# Patient Record
Sex: MALE | Race: BLACK OR AFRICAN AMERICAN | Employment: OTHER | URBAN - METROPOLITAN AREA
[De-identification: names, ages, dates, MRNs, and addresses within clinical notes are randomized per-mention and may not be internally consistent; named-entity substitution may affect disease eponyms.]

---

## 2017-03-08 ENCOUNTER — OFFICE VISIT (OUTPATIENT)
Dept: CARDIOLOGY CLINIC | Age: 82
End: 2017-03-08

## 2017-03-08 ENCOUNTER — PROCEDURE VISIT (OUTPATIENT)
Dept: CARDIOLOGY CLINIC | Age: 82
End: 2017-03-08

## 2017-03-08 VITALS
HEART RATE: 94 BPM | DIASTOLIC BLOOD PRESSURE: 80 MMHG | BODY MASS INDEX: 25.99 KG/M2 | SYSTOLIC BLOOD PRESSURE: 130 MMHG | WEIGHT: 189 LBS

## 2017-03-08 DIAGNOSIS — I48.91 ATRIAL FIBRILLATION, UNSPECIFIED TYPE (HCC): Primary | ICD-10-CM

## 2017-03-08 DIAGNOSIS — I22.2 ACUTE NON-ST-ELEVATION MI FOLLOWING PREVIOUS MI (HCC): ICD-10-CM

## 2017-03-08 DIAGNOSIS — Z95.0 CARDIAC PACEMAKER IN SITU: ICD-10-CM

## 2017-03-08 DIAGNOSIS — R00.1 BRADYCARDIA: ICD-10-CM

## 2017-03-08 PROCEDURE — G8484 FLU IMMUNIZE NO ADMIN: HCPCS | Performed by: INTERNAL MEDICINE

## 2017-03-08 PROCEDURE — 1036F TOBACCO NON-USER: CPT | Performed by: INTERNAL MEDICINE

## 2017-03-08 PROCEDURE — G8419 CALC BMI OUT NRM PARAM NOF/U: HCPCS | Performed by: INTERNAL MEDICINE

## 2017-03-08 PROCEDURE — 93000 ELECTROCARDIOGRAM COMPLETE: CPT | Performed by: INTERNAL MEDICINE

## 2017-03-08 PROCEDURE — 1123F ACP DISCUSS/DSCN MKR DOCD: CPT | Performed by: INTERNAL MEDICINE

## 2017-03-08 PROCEDURE — 93280 PM DEVICE PROGR EVAL DUAL: CPT | Performed by: INTERNAL MEDICINE

## 2017-03-08 PROCEDURE — 4040F PNEUMOC VAC/ADMIN/RCVD: CPT | Performed by: INTERNAL MEDICINE

## 2017-03-08 PROCEDURE — G8427 DOCREV CUR MEDS BY ELIG CLIN: HCPCS | Performed by: INTERNAL MEDICINE

## 2017-03-08 PROCEDURE — 99215 OFFICE O/P EST HI 40 MIN: CPT | Performed by: INTERNAL MEDICINE

## 2017-03-08 RX ORDER — FINASTERIDE 5 MG/1
5 TABLET, FILM COATED ORAL DAILY
COMMUNITY
Start: 2017-03-05

## 2017-03-08 RX ORDER — METOPROLOL SUCCINATE 50 MG/1
TABLET, EXTENDED RELEASE ORAL
Refills: 2 | COMMUNITY
Start: 2017-01-05 | End: 2017-10-05 | Stop reason: SDUPTHER

## 2017-03-08 RX ORDER — SIMVASTATIN 40 MG
TABLET ORAL
Refills: 2 | COMMUNITY
Start: 2017-01-05

## 2017-03-08 RX ORDER — FUROSEMIDE 40 MG/1
TABLET ORAL
Refills: 3 | Status: ON HOLD | COMMUNITY
Start: 2017-01-17 | End: 2020-07-05 | Stop reason: SDUPTHER

## 2017-03-08 RX ORDER — BIMATOPROST 0.01 %
DROPS OPHTHALMIC (EYE)
COMMUNITY
Start: 2017-03-05 | End: 2020-07-01

## 2017-04-03 ENCOUNTER — HOSPITAL ENCOUNTER (OUTPATIENT)
Dept: NON INVASIVE DIAGNOSTICS | Age: 82
Discharge: HOME | End: 2017-04-03
Attending: INTERNAL MEDICINE | Admitting: INTERNAL MEDICINE

## 2017-04-03 DIAGNOSIS — I48.91 ATRIAL FIBRILLATION (HCC): ICD-10-CM

## 2017-04-03 LAB
LV EF: 50 %
LVEF MODALITY: NORMAL

## 2017-04-10 ENCOUNTER — TELEPHONE (OUTPATIENT)
Dept: CARDIOLOGY CLINIC | Age: 82
End: 2017-04-10

## 2017-06-01 ENCOUNTER — OFFICE VISIT (OUTPATIENT)
Dept: CARDIOLOGY CLINIC | Age: 82
End: 2017-06-01

## 2017-06-01 ENCOUNTER — PROCEDURE VISIT (OUTPATIENT)
Dept: CARDIOLOGY CLINIC | Age: 82
End: 2017-06-01

## 2017-06-01 VITALS
WEIGHT: 181 LBS | DIASTOLIC BLOOD PRESSURE: 70 MMHG | SYSTOLIC BLOOD PRESSURE: 110 MMHG | BODY MASS INDEX: 24.89 KG/M2 | HEART RATE: 76 BPM

## 2017-06-01 DIAGNOSIS — R00.1 BRADYCARDIA: ICD-10-CM

## 2017-06-01 DIAGNOSIS — Z95.0 CARDIAC PACEMAKER IN SITU: ICD-10-CM

## 2017-06-01 DIAGNOSIS — I48.91 ATRIAL FIBRILLATION, UNSPECIFIED TYPE (HCC): Primary | ICD-10-CM

## 2017-06-01 PROCEDURE — 99214 OFFICE O/P EST MOD 30 MIN: CPT | Performed by: INTERNAL MEDICINE

## 2017-06-01 PROCEDURE — G8420 CALC BMI NORM PARAMETERS: HCPCS | Performed by: INTERNAL MEDICINE

## 2017-06-01 PROCEDURE — 4040F PNEUMOC VAC/ADMIN/RCVD: CPT | Performed by: INTERNAL MEDICINE

## 2017-06-01 PROCEDURE — 93280 PM DEVICE PROGR EVAL DUAL: CPT | Performed by: INTERNAL MEDICINE

## 2017-06-01 PROCEDURE — 1036F TOBACCO NON-USER: CPT | Performed by: INTERNAL MEDICINE

## 2017-06-01 PROCEDURE — 1123F ACP DISCUSS/DSCN MKR DOCD: CPT | Performed by: INTERNAL MEDICINE

## 2017-06-01 PROCEDURE — G8427 DOCREV CUR MEDS BY ELIG CLIN: HCPCS | Performed by: INTERNAL MEDICINE

## 2017-06-01 RX ORDER — TIMOLOL MALEATE 5 MG/ML
1 SOLUTION OPHTHALMIC 2 TIMES DAILY
COMMUNITY

## 2017-06-01 RX ORDER — ASPIRIN 81 MG/1
81 TABLET, CHEWABLE ORAL DAILY
COMMUNITY
End: 2019-12-12

## 2017-08-24 ENCOUNTER — TELEPHONE (OUTPATIENT)
Dept: CARDIOLOGY CLINIC | Age: 82
End: 2017-08-24

## 2017-09-07 ENCOUNTER — PROCEDURE VISIT (OUTPATIENT)
Dept: CARDIOLOGY CLINIC | Age: 82
End: 2017-09-07

## 2017-09-07 DIAGNOSIS — R00.1 BRADYCARDIA: ICD-10-CM

## 2017-09-07 DIAGNOSIS — Z95.0 CARDIAC PACEMAKER IN SITU: ICD-10-CM

## 2017-09-07 PROCEDURE — 93280 PM DEVICE PROGR EVAL DUAL: CPT | Performed by: INTERNAL MEDICINE

## 2017-10-05 DIAGNOSIS — I10 ESSENTIAL HYPERTENSION: Primary | ICD-10-CM

## 2017-10-05 DIAGNOSIS — I48.91 ATRIAL FIBRILLATION, UNSPECIFIED TYPE (HCC): ICD-10-CM

## 2017-10-05 RX ORDER — METOPROLOL SUCCINATE 50 MG/1
TABLET, EXTENDED RELEASE ORAL
Qty: 30 TABLET | Refills: 0 | Status: ON HOLD | OUTPATIENT
Start: 2017-10-05 | End: 2020-07-05 | Stop reason: SDUPTHER

## 2017-10-05 RX ORDER — LISINOPRIL 10 MG/1
10 TABLET ORAL DAILY
Qty: 30 TABLET | Refills: 0 | Status: ON HOLD | OUTPATIENT
Start: 2017-10-05 | End: 2020-07-05 | Stop reason: HOSPADM

## 2017-10-05 NOTE — TELEPHONE ENCOUNTER
Daughter calling back to say the meds  That he needs filled right away.  Has NOT had any for 4 days    Lisinopril    Metoprolol

## 2017-10-05 NOTE — TELEPHONE ENCOUNTER
Called and spoke with Wei informed her that PCP Dr. Deborah Bills signed this ordered and RX was sent to him to sign, she verbalized understanding and stated pt has an apt to see PCP tomorrow

## 2017-10-05 NOTE — TELEPHONE ENCOUNTER
Please call daughter Nasir Walker about her fathers meds.     She thinks he may be out of 2 BP med but not really sure what they are    Please call her to discuss    [facility] [address] on [address]    Please call her to advise

## 2017-10-05 NOTE — TELEPHONE ENCOUNTER
This patient recent saw Chiki Matthews and has pacemaker. Will refill lisinopril and toprol xl for 1 month. He has appt to see PCP tomorrow. Spoke to his daughter. Verified pharmacy-[facility]. Phone in 2 Rx for him. No refill will have PCP re-eval his BP tomorrow and adj meds if necessary. Daughter will make sure pt take his meds on time tonight and let PCP know tomorrow.

## 2017-12-12 ENCOUNTER — NURSE ONLY (OUTPATIENT)
Dept: CARDIOLOGY CLINIC | Age: 82
End: 2017-12-12

## 2017-12-12 DIAGNOSIS — R00.1 BRADYCARDIA: ICD-10-CM

## 2017-12-12 DIAGNOSIS — Z95.0 CARDIAC PACEMAKER IN SITU: ICD-10-CM

## 2017-12-12 PROCEDURE — 93296 REM INTERROG EVL PM/IDS: CPT | Performed by: INTERNAL MEDICINE

## 2017-12-12 PROCEDURE — 93294 REM INTERROG EVL PM/LDLS PM: CPT | Performed by: INTERNAL MEDICINE

## 2018-02-15 ENCOUNTER — OFFICE VISIT (OUTPATIENT)
Dept: CARDIOLOGY CLINIC | Age: 83
End: 2018-02-15

## 2018-02-15 ENCOUNTER — PROCEDURE VISIT (OUTPATIENT)
Dept: CARDIOLOGY CLINIC | Age: 83
End: 2018-02-15

## 2018-02-15 VITALS — BODY MASS INDEX: 25.44 KG/M2 | HEART RATE: 72 BPM | WEIGHT: 185 LBS

## 2018-02-15 DIAGNOSIS — I49.1 PAC (PREMATURE ATRIAL CONTRACTION): ICD-10-CM

## 2018-02-15 DIAGNOSIS — R00.1 BRADYCARDIA: ICD-10-CM

## 2018-02-15 DIAGNOSIS — N18.9 CHRONIC KIDNEY DISEASE, UNSPECIFIED CKD STAGE: ICD-10-CM

## 2018-02-15 DIAGNOSIS — Z95.0 CARDIAC PACEMAKER IN SITU: ICD-10-CM

## 2018-02-15 DIAGNOSIS — I44.30 AV BLOCK: Primary | ICD-10-CM

## 2018-02-15 PROCEDURE — 99214 OFFICE O/P EST MOD 30 MIN: CPT | Performed by: INTERNAL MEDICINE

## 2018-02-15 PROCEDURE — 93280 PM DEVICE PROGR EVAL DUAL: CPT | Performed by: INTERNAL MEDICINE

## 2018-05-22 ENCOUNTER — NURSE ONLY (OUTPATIENT)
Dept: CARDIOLOGY CLINIC | Age: 83
End: 2018-05-22

## 2018-05-22 DIAGNOSIS — Z95.0 CARDIAC PACEMAKER IN SITU: ICD-10-CM

## 2018-05-22 DIAGNOSIS — R00.1 BRADYCARDIA: ICD-10-CM

## 2018-05-22 PROCEDURE — 93294 REM INTERROG EVL PM/LDLS PM: CPT | Performed by: INTERNAL MEDICINE

## 2018-05-22 PROCEDURE — 93296 REM INTERROG EVL PM/IDS: CPT | Performed by: INTERNAL MEDICINE

## 2018-08-21 ENCOUNTER — NURSE ONLY (OUTPATIENT)
Dept: CARDIOLOGY CLINIC | Age: 83
End: 2018-08-21

## 2018-08-21 DIAGNOSIS — Z95.0 CARDIAC PACEMAKER IN SITU: ICD-10-CM

## 2018-08-21 DIAGNOSIS — R00.1 BRADYCARDIA: ICD-10-CM

## 2018-08-21 PROCEDURE — 93294 REM INTERROG EVL PM/LDLS PM: CPT | Performed by: INTERNAL MEDICINE

## 2018-08-21 PROCEDURE — 93296 REM INTERROG EVL PM/IDS: CPT | Performed by: INTERNAL MEDICINE

## 2018-08-24 NOTE — PROGRESS NOTES
Merlin transmission shows normal device function. Battery and lead function stable. No VT or AT/AF. Known low amplitude atrial sensing. See report under media tab. Recheck 3 mos.  mk

## 2018-11-20 ENCOUNTER — NURSE ONLY (OUTPATIENT)
Dept: CARDIOLOGY CLINIC | Age: 83
End: 2018-11-20

## 2018-11-20 DIAGNOSIS — R00.1 BRADYCARDIA: ICD-10-CM

## 2018-11-20 DIAGNOSIS — Z95.0 CARDIAC PACEMAKER IN SITU: Primary | ICD-10-CM

## 2018-11-20 NOTE — LETTER
[address] [facility] [phone]  [facility]- [phone]  [place] [address] [phone]    Pacemaker/Defibrillator Clinic          11/26/18        Cuca Conrad  [address] [address]        Dear Cuca Conrad    This letter is to inform you that we received the transmission from your monitor at home that checks your pacemaker and/or defibrillator, or implanted heart monitor. The next date your monitor will automatically transmit will be 5/21/19. Please do not send additional routine transmissions unless specifically requested. Your device and monitor are wireless and most transmit cellularly, but please periodically check your monitor is still plugged in to the electrical outlet. If you still use the telephone land line to send please ensure the connection to the phone marleny is secure. This will help to ensure successful automatic transmissions in the future. Also, the monitor needs to be close to you while sleeping at night. Please be aware that the remote device transmission sites are periodically monitored only during regular business hours during which simultaneous in-office device clinics are being run. If your transmission requires attention, we will contact you as soon as possible. Thank you.             David Salazar

## 2018-11-26 PROCEDURE — 93296 REM INTERROG EVL PM/IDS: CPT | Performed by: INTERNAL MEDICINE

## 2018-11-26 PROCEDURE — 93294 REM INTERROG EVL PM/LDLS PM: CPT | Performed by: INTERNAL MEDICINE

## 2018-11-26 NOTE — PROGRESS NOTES
Biotronik transmission shows normal device function. Battery and lead function stable. No VT or known AT/AF. See report under media tab. Recheck 3 mos.  mk

## 2019-02-18 ENCOUNTER — PROCEDURE VISIT (OUTPATIENT)
Dept: CARDIOLOGY CLINIC | Age: 84
End: 2019-02-18
Payer: MEDICARE

## 2019-02-18 ENCOUNTER — OFFICE VISIT (OUTPATIENT)
Dept: CARDIOLOGY CLINIC | Age: 84
End: 2019-02-18
Payer: MEDICARE

## 2019-02-18 VITALS
BODY MASS INDEX: 25.19 KG/M2 | WEIGHT: 186 LBS | HEART RATE: 94 BPM | DIASTOLIC BLOOD PRESSURE: 70 MMHG | SYSTOLIC BLOOD PRESSURE: 112 MMHG | HEIGHT: 72 IN

## 2019-02-18 DIAGNOSIS — R00.1 BRADYCARDIA: ICD-10-CM

## 2019-02-18 DIAGNOSIS — I49.1 PAC (PREMATURE ATRIAL CONTRACTION): ICD-10-CM

## 2019-02-18 DIAGNOSIS — Z95.0 CARDIAC PACEMAKER IN SITU: ICD-10-CM

## 2019-02-18 DIAGNOSIS — N18.9 CHRONIC KIDNEY DISEASE, UNSPECIFIED CKD STAGE: ICD-10-CM

## 2019-02-18 DIAGNOSIS — I10 ESSENTIAL HYPERTENSION: ICD-10-CM

## 2019-02-18 DIAGNOSIS — R00.1 BRADYCARDIA: Primary | ICD-10-CM

## 2019-02-18 DIAGNOSIS — I48.0 PAROXYSMAL ATRIAL FIBRILLATION (HCC): ICD-10-CM

## 2019-02-18 DIAGNOSIS — I44.30 AV BLOCK: ICD-10-CM

## 2019-02-18 LAB
ANION GAP SERPL CALCULATED.3IONS-SCNC: 14 MMOL/L (ref 3–16)
BUN BLDV-MCNC: 29 MG/DL (ref 7–20)
CALCIUM SERPL-MCNC: 9.6 MG/DL (ref 8.3–10.6)
CHLORIDE BLD-SCNC: 102 MMOL/L (ref 99–110)
CO2: 22 MMOL/L (ref 21–32)
CREAT SERPL-MCNC: 1.8 MG/DL (ref 0.8–1.3)
GFR AFRICAN AMERICAN: 42
GFR NON-AFRICAN AMERICAN: 35
GLUCOSE BLD-MCNC: 90 MG/DL (ref 70–99)
POTASSIUM SERPL-SCNC: 4.3 MMOL/L (ref 3.5–5.1)
SODIUM BLD-SCNC: 138 MMOL/L (ref 136–145)

## 2019-02-18 PROCEDURE — 93280 PM DEVICE PROGR EVAL DUAL: CPT | Performed by: INTERNAL MEDICINE

## 2019-02-18 PROCEDURE — 93000 ELECTROCARDIOGRAM COMPLETE: CPT | Performed by: INTERNAL MEDICINE

## 2019-02-18 PROCEDURE — 99214 OFFICE O/P EST MOD 30 MIN: CPT | Performed by: INTERNAL MEDICINE

## 2019-05-21 ENCOUNTER — NURSE ONLY (OUTPATIENT)
Dept: CARDIOLOGY CLINIC | Age: 84
End: 2019-05-21
Payer: MEDICARE

## 2019-05-21 DIAGNOSIS — Z95.0 CARDIAC PACEMAKER IN SITU: ICD-10-CM

## 2019-05-21 DIAGNOSIS — R00.1 BRADYCARDIA: ICD-10-CM

## 2019-05-21 PROCEDURE — 93294 REM INTERROG EVL PM/LDLS PM: CPT | Performed by: INTERNAL MEDICINE

## 2019-05-21 PROCEDURE — 93296 REM INTERROG EVL PM/IDS: CPT | Performed by: INTERNAL MEDICINE

## 2019-05-21 NOTE — LETTER
[address] [facility] [phone]  [facility]- [phone]  [place] [address] [phone]    Pacemaker/Defibrillator Clinic      05/30/19        Cuca Conrad  [address] [address]        Dear Cuca Conrad    This letter is to inform you that we received the transmission from your monitor at home that checks your pacemaker and/or defibrillator, or implanted heart monitor. The next date your monitor will automatically transmit will be 8/27/19. Please do not send additional routine transmissions unless specifically requested. Your device and monitor are wireless and most transmit cellularly, but please periodically check your monitor is still plugged in to the electrical outlet. If you still use the telephone land line to send please ensure the connection to the phone marleny is secure. This will help to ensure successful automatic transmissions in the future. Also, the monitor needs to be close to you while sleeping at night. Please be aware that the remote device transmission sites are periodically monitored only during regular business hours during which simultaneous in-office device clinics are being run. If your transmission requires attention, we will contact you as soon as possible. Please also keep in mind that implanted devices should be checked in the office manually once per year. Please call our office if you need to schedule this visit. Thank you.             David Salazar

## 2019-05-30 NOTE — PROGRESS NOTES
[employer] transmission shows normal device function. Battery and lead function stable. No VHR. Known sensing issues on atrial lead. Changed from DDD 70 to VVI 40. Unsure about PVC counter. See Paceart report under cardiology tab. Recheck 3 mos.  mk

## 2019-06-07 ENCOUNTER — TELEPHONE (OUTPATIENT)
Dept: CARDIOLOGY CLINIC | Age: 84
End: 2019-06-07

## 2019-06-07 ENCOUNTER — OFFICE VISIT (OUTPATIENT)
Dept: CARDIOLOGY CLINIC | Age: 84
End: 2019-06-07
Payer: MEDICARE

## 2019-06-07 VITALS
BODY MASS INDEX: 24.52 KG/M2 | DIASTOLIC BLOOD PRESSURE: 60 MMHG | SYSTOLIC BLOOD PRESSURE: 126 MMHG | WEIGHT: 180.8 LBS | HEART RATE: 44 BPM

## 2019-06-07 DIAGNOSIS — Z79.899 LONG TERM USE OF DRUG: ICD-10-CM

## 2019-06-07 DIAGNOSIS — R06.09 EXERTIONAL DYSPNEA: ICD-10-CM

## 2019-06-07 DIAGNOSIS — R60.0 LOCALIZED EDEMA: ICD-10-CM

## 2019-06-07 DIAGNOSIS — R07.9 CHEST PAIN, UNSPECIFIED TYPE: Primary | ICD-10-CM

## 2019-06-07 DIAGNOSIS — I48.19 PERSISTENT ATRIAL FIBRILLATION (HCC): ICD-10-CM

## 2019-06-07 DIAGNOSIS — R00.1 BRADYCARDIA: ICD-10-CM

## 2019-06-07 LAB
ANION GAP SERPL CALCULATED.3IONS-SCNC: 16 MMOL/L (ref 3–16)
BUN BLDV-MCNC: 35 MG/DL (ref 7–20)
CALCIUM SERPL-MCNC: 9.7 MG/DL (ref 8.3–10.6)
CHLORIDE BLD-SCNC: 107 MMOL/L (ref 99–110)
CO2: 19 MMOL/L (ref 21–32)
CREAT SERPL-MCNC: 2.1 MG/DL (ref 0.8–1.3)
GFR AFRICAN AMERICAN: 35
GFR NON-AFRICAN AMERICAN: 29
GLUCOSE BLD-MCNC: 91 MG/DL (ref 70–99)
POTASSIUM SERPL-SCNC: 4.9 MMOL/L (ref 3.5–5.1)
PRO-BNP: 6365 PG/ML (ref 0–449)
SODIUM BLD-SCNC: 142 MMOL/L (ref 136–145)

## 2019-06-07 PROCEDURE — 99214 OFFICE O/P EST MOD 30 MIN: CPT | Performed by: INTERNAL MEDICINE

## 2019-06-07 PROCEDURE — 93000 ELECTROCARDIOGRAM COMPLETE: CPT | Performed by: INTERNAL MEDICINE

## 2019-06-07 NOTE — PROGRESS NOTES
Cc: exertional chest pain and dyspnea, MR, edema    HPI:     Mr Vicki Stewart is a 81 yo man with h/o CAF s/p unsuccessful AVJ ablation and Biotronik dual chamber PPM in 07/2011, follows with Dr. Solomon Smart. The RA lead appears to be dislodged and undersensing afib. Device was changed to VVI to minimize RV pacing (was pacing the RV 90%) in 02/2019. Patient was referred to me by his PCP, Dr. Gabriel Nobles as he has been complaining of exertional chest pains and dyspnea x 2 months with some bilateral leg edema. Echo 2017: LVEF 50%, moderate MR, nl RV, mild valve disease, RVSP 42    Nicki 2011: normal.       Histories     Past Medical History:   has a past medical history of Hypertension and SVT (supraventricular tachycardia) ([address] Surgical History:   has a past surgical history that includes Prostate surgery; sinus surgery; and Carpal tunnel release. Social History:   reports that he quit smoking about 24 years ago. His smoking use included pipe and cigars. He has never used smokeless tobacco. He reports that he drinks alcohol. He reports that he does not use drugs. Family History:  No evidence for sudden cardiac death or premature CAD      Medications:     Home medications were reviewed and are listed below    Prior to Admission medications    Medication Sig Start Date End Date Taking?  Authorizing Provider   lisinopril (PRINIVIL;ZESTRIL) 10 MG tablet Take 1 tablet by mouth daily 10/5/17  Yes Kaycee Goyal MD   metoprolol succinate (TOPROL XL) 50 MG extended release tablet TAKE 1 TABLET EVERY DAY 10/5/17  Yes Kaycee Goyal MD   aspirin 81 MG chewable tablet Take 81 mg by mouth daily   Yes Historical Provider, MD   timolol (TIMOPTIC-XE) 0.5 % ophthalmic gel-forming 1 drop daily   Yes Historical Provider, MD   finasteride (PROSCAR) 5 MG tablet  3/5/17  Yes Historical Provider, MD   simvastatin (ZOCOR) 40 MG tablet TAKE 1 TABLET IN THE EVENING 1/5/17  Yes Historical Provider, MD   furosemide (LASIX) 40 MG tablet TAKE 1 TABLET BY MOUTH ON MONDAY, [facility] AND FRIDAY 1/17/17  Yes Historical Provider, MD FERNANDEZ 0.01 % SOLN ophthalmic drops  3/5/17  Yes Historical Provider, MD          Allergy:     Patient has no known allergies. Review of Systems:     All 12 point review of symptoms completed. Pertinent positives identified in the HPI, all other review of symptoms negative as below. CONSTITUTIONAL: No fatigue  SKIN: No rash or pruritis. EYES: No visual changes or diplopia. No scleral icterus. ENT: No Headaches, hearing loss or vertigo. No mouth sores or sore throat. CARDIOVASCULAR: + chest pain/chest pressure/chest discomfort. No palpitations. No edema. RESPIRATORY: + dyspnea. No cough or wheezing, no sputum production. GASTROINTESTINAL: No N/V/D. No abdominal pain, appetite loss, blood in stools. GENITOURINARY: No dysuria, trouble voiding, or hematuria. MUSCULOSKELETAL:  No gait disturbance, weakness or joint complaints. NEUROLOGICAL: No headache, diplopia, change in muscle strength, numbness or tingling. No change in gait, balance, coordination, mood, affect, memory, mentation, behavior. PSHYCH: No anxiety, loss of interest, change in sexual behavior, feelings of self-harm, or confusion. ENDOCRINE: No excessive thirst, fluid intake, or urination. No tremor. HEMATOLOGIC: No abnormal bruising or bleeding. ALLERGY: No nasal congestion or hives.       Physical Examination:     Vitals:    06/07/19 0928   BP: 126/60   Pulse: (!) 44   Weight: 180 lb 12.8 oz (82 kg)       Wt Readings from Last 3 Encounters:   06/07/19 180 lb 12.8 oz (82 kg)   02/18/19 186 lb (84.4 kg)   02/15/18 185 lb (83.9 kg)         General Appearance:  Alert, cooperative, no distress, appears stated age Appropriate weight   Head:  Normocephalic, without obvious abnormality, atraumatic   Eyes:  PERRL, conjunctiva/corneas clear EOM intact  Ears normal   Throat no lesions       Nose: Nares normal, no drainage or sinus tenderness   Throat: Lips, mucosa, and tongue normal   Neck: Supple, symmetrical, trachea midline, no adenopathy, thyroid: not enlarged, symmetric, no tenderness/mass/nodules, no carotid bruit       Lungs:   Clear to auscultation bilaterally, respirations unlabored   Chest Wall:  No tenderness or deformity   Heart:  Regular rhythm, rate is controlled, S1, S2 normal, there is no murmur, there is no rub or gallop, no jvd, no bilateral lower extremity edema   Abdomen:   Soft, non-tender, bowel sounds active all four quadrants,  no masses, no organomegaly       Extremities: Extremities normal, atraumatic, no cyanosis   Pulses: 2+ and symmetric   Skin: Skin color, texture, turgor normal, no rashes or lesions   Pysch: Normal mood and affect   Neurologic: Normal gross motor and sensory exam.  Cranial nerves intact        Labs:     Lab Results   Component Value Date    WBC 4.7 04/10/2012    HGB 11.2 (L) 10/31/2018    HCT 34.1 (L) 10/31/2018    MCV 89.0 04/10/2012     04/10/2012     Lab Results   Component Value Date     02/18/2019    K 4.3 02/18/2019     02/18/2019    CO2 22 02/18/2019    BUN 29 (H) 02/18/2019    CREATININE 1.8 (H) 02/18/2019    GLUCOSE 90 02/18/2019    CALCIUM 9.6 02/18/2019    LABALBU 3.8 10/31/2018    LABGLOM 35 (A) 02/18/2019    GFRAA 42 (A) 02/18/2019         No results found for: CHOL  No results found for: TRIG  No results found for: HDL  No results found for: LDLCHOLESTEROL, LDLCALC  No results found for: LABVLDL, VLDL  No results found for: [place]    Lab Results   Component Value Date    INR 1.1 06/18/2011    PROTIME 13.5 06/18/2011       The ASCVD Risk score (Opal Kolb, et al., 2013) failed to calculate for the following reasons:     The 2013 ASCVD risk score is only valid for ages 36 to 78    The patient has a prior MI or stroke diagnosis    Imaging:     I have personally reviewed the ECG 6/7/19: AF with SVR 44 bpm, one PVC, RBBB, LAFB    Assessment / Plan: Diagnosis Orders   1. Chest pain, unspecified type  Stress test nuclear    Echo 2D w doppler w color complete    Stress test, lexiscan   2. Bradycardia  EKG 12 Lead    Stress test, lexiscan   3. Exertional dyspnea  Stress test nuclear    Echo 2D w doppler w color complete    Stress test, lexiscan   4. Localized edema  Stress test nuclear    Echo 2D w doppler w color complete    Stress test, lexiscan   5. Persistent atrial fibrillation (HCC)  Stress test, lexiscan   6. Long term use of drug  BASIC METABOLIC PANEL    BRAIN NATRIURETIC PEPTIDE (BNP)    Stress test, lexiscan        1. Exertional chest pain and dyspnea:   I cannot exclude underlying ischemia.    -I have extensively discussed code status with patient and daughter Park Mack, patient wishes to remain FULL code but will further discuss it with his family and Dr Razia Stout in the near future.   -I will obtain an echo and a luis antonio to assess possible causes. -Symptoms could be due to recent device changes and now bradycardia on Toprol. We will ask patient to decrease Toprol to 25 daily.   -Will check a BMP and BNP    2. CAF:   Per Dr Nancy Daly    3. S/p unsuccessful AVJ ablation and PPM implant:   Per Dr Nancy Daly        I have spent 25 minutes of face to face time with the patient with more than 50% spent counseling and coordinating care. Return in about 1 month (around 7/5/2019). I have personally reviewed the reports and images of labs, radiological studies, cardiac studies including ECG's and telemetry, current and old medical records. The note was completed using EMR. Every effort was made to ensure accuracy; however, inadvertent computerized transcription errors may be present. All questions and concerns were addressed to the patient/family. Alternatives to my treatment were discussed. I would like to thank you for providing me the opportunity to participate in the care of your patient.  If you have any questions, please do not hesitate to contact me.      Erica Barcenas MD, [facility] [place], [address]  The [address] [address]  Ph: [phone]  Fax: [phone]

## 2019-06-07 NOTE — TELEPHONE ENCOUNTER
Left message with Marie Kwaning to let them know that appt for stress and echo was switched to 6/14 at 1 pm and echo at 3:30 pm

## 2019-06-10 ENCOUNTER — TELEPHONE (OUTPATIENT)
Dept: CARDIOLOGY CLINIC | Age: 84
End: 2019-06-10

## 2019-06-10 NOTE — TELEPHONE ENCOUNTER
----- Message from Dave Moscoso MD sent at 6/7/2019  4:48 PM EDT -----  Melina Dear, could you please call patient and ask him to decrease his Toprol to half a tab of 50 mg daily (that is, down to 25 daily)?  Thx

## 2019-06-12 NOTE — TELEPHONE ENCOUNTER
Pt's daughter called to inform us pt decided to have the stress test after seeing Dr. Gabriel Nobles. Pt will be having the Echo and stress test as GEB ordered. Provided scheduling number for pt's daughter to call and schedule.

## 2019-06-14 ENCOUNTER — HOSPITAL ENCOUNTER (OUTPATIENT)
Dept: NON INVASIVE DIAGNOSTICS | Age: 84
Discharge: HOME | End: 2019-06-14
Payer: MEDICARE

## 2019-06-14 DIAGNOSIS — R60.0 LOCALIZED EDEMA: ICD-10-CM

## 2019-06-14 DIAGNOSIS — I48.19 PERSISTENT ATRIAL FIBRILLATION (HCC): ICD-10-CM

## 2019-06-14 DIAGNOSIS — R07.9 CHEST PAIN, UNSPECIFIED TYPE: ICD-10-CM

## 2019-06-14 DIAGNOSIS — Z79.899 LONG TERM USE OF DRUG: ICD-10-CM

## 2019-06-14 DIAGNOSIS — R06.09 EXERTIONAL DYSPNEA: ICD-10-CM

## 2019-06-14 DIAGNOSIS — R00.1 BRADYCARDIA: ICD-10-CM

## 2019-06-14 LAB
LV EF: 53 %
LVEF MODALITY: NORMAL

## 2019-06-14 PROCEDURE — 93306 TTE W/DOPPLER COMPLETE: CPT

## 2019-06-20 ENCOUNTER — HOSPITAL ENCOUNTER (OUTPATIENT)
Dept: NON INVASIVE DIAGNOSTICS | Age: 84
Discharge: HOME | End: 2019-06-20
Payer: MEDICARE

## 2019-06-20 DIAGNOSIS — R07.9 CHEST PAIN, UNSPECIFIED TYPE: ICD-10-CM

## 2019-06-20 DIAGNOSIS — R60.0 LOCALIZED EDEMA: ICD-10-CM

## 2019-06-20 DIAGNOSIS — R06.09 EXERTIONAL DYSPNEA: ICD-10-CM

## 2019-06-20 LAB
LV EF: 64 %
LVEF MODALITY: NORMAL

## 2019-06-20 PROCEDURE — 3430000000 HC RX DIAGNOSTIC RADIOPHARMACEUTICAL: Performed by: INTERNAL MEDICINE

## 2019-06-20 PROCEDURE — 2580000003 HC RX 258: Performed by: INTERNAL MEDICINE

## 2019-06-20 PROCEDURE — 6360000002 HC RX W HCPCS: Performed by: INTERNAL MEDICINE

## 2019-06-20 PROCEDURE — 93017 CV STRESS TEST TRACING ONLY: CPT

## 2019-06-20 PROCEDURE — A9502 TC99M TETROFOSMIN: HCPCS | Performed by: INTERNAL MEDICINE

## 2019-06-20 PROCEDURE — 78452 HT MUSCLE IMAGE SPECT MULT: CPT

## 2019-06-20 RX ORDER — SODIUM CHLORIDE 0.9 % (FLUSH) 0.9 %
10 SYRINGE (ML) INJECTION PRN
Status: DISCONTINUED | OUTPATIENT
Start: 2019-06-20 | End: 2019-06-21 | Stop reason: HOSPADM

## 2019-06-20 RX ADMIN — Medication 10 ML: at 14:25

## 2019-06-20 RX ADMIN — Medication 10 ML: at 15:48

## 2019-06-20 RX ADMIN — REGADENOSON 0.4 MG: 0.08 INJECTION, SOLUTION INTRAVENOUS at 15:47

## 2019-06-20 RX ADMIN — TETROFOSMIN 10 MILLICURIE: 1.38 INJECTION, POWDER, LYOPHILIZED, FOR SOLUTION INTRAVENOUS at 14:25

## 2019-06-20 RX ADMIN — TETROFOSMIN 30 MILLICURIE: 1.38 INJECTION, POWDER, LYOPHILIZED, FOR SOLUTION INTRAVENOUS at 15:47

## 2019-07-09 ENCOUNTER — OFFICE VISIT (OUTPATIENT)
Dept: CARDIOLOGY CLINIC | Age: 84
End: 2019-07-09
Payer: MEDICARE

## 2019-07-09 VITALS
DIASTOLIC BLOOD PRESSURE: 50 MMHG | WEIGHT: 173 LBS | HEART RATE: 60 BPM | SYSTOLIC BLOOD PRESSURE: 120 MMHG | BODY MASS INDEX: 23.46 KG/M2

## 2019-07-09 DIAGNOSIS — R07.9 CHEST PAIN, UNSPECIFIED TYPE: ICD-10-CM

## 2019-07-09 DIAGNOSIS — R06.09 EXERTIONAL DYSPNEA: ICD-10-CM

## 2019-07-09 DIAGNOSIS — Z95.0 CARDIAC PACEMAKER IN SITU: ICD-10-CM

## 2019-07-09 DIAGNOSIS — R00.1 BRADYCARDIA: ICD-10-CM

## 2019-07-09 DIAGNOSIS — R60.0 LOCALIZED EDEMA: ICD-10-CM

## 2019-07-09 DIAGNOSIS — I10 ESSENTIAL HYPERTENSION: ICD-10-CM

## 2019-07-09 DIAGNOSIS — I48.20 CHRONIC ATRIAL FIBRILLATION (HCC): Primary | ICD-10-CM

## 2019-07-09 DIAGNOSIS — N18.30 CKD (CHRONIC KIDNEY DISEASE) STAGE 3, GFR 30-59 ML/MIN (HCC): ICD-10-CM

## 2019-07-09 PROCEDURE — 99213 OFFICE O/P EST LOW 20 MIN: CPT | Performed by: INTERNAL MEDICINE

## 2019-07-10 NOTE — PROGRESS NOTES
TAKE 1 TABLET EVERY DAY 10/5/17  Yes Emile Anderson MD   aspirin 81 MG chewable tablet Take 81 mg by mouth daily   Yes Historical Provider, MD   timolol (TIMOPTIC-XE) 0.5 % ophthalmic gel-forming 1 drop daily   Yes Historical Provider, MD   finasteride (PROSCAR) 5 MG tablet  3/5/17  Yes Historical Provider, MD   simvastatin (ZOCOR) 40 MG tablet TAKE 1 TABLET IN THE EVENING 1/5/17  Yes Historical Provider, MD   furosemide (LASIX) 40 MG tablet TAKE 1 TABLET BY MOUTH ON MONDAY, [facility] AND FRIDAY 1/17/17  Yes Historical Provider, MD FERNANDEZ 0.01 % SOLN ophthalmic drops  3/5/17  Yes Historical Provider, MD          Allergy:     Patient has no known allergies. Review of Systems:     All 12 point review of symptoms completed. Pertinent positives identified in the HPI, all other review of symptoms negative as below. CONSTITUTIONAL: No fatigue  SKIN: No rash or pruritis. EYES: No visual changes or diplopia. No scleral icterus. ENT: No Headaches, hearing loss or vertigo. No mouth sores or sore throat. CARDIOVASCULAR: No chest pain/chest pressure/chest discomfort. No palpitations. No edema. RESPIRATORY: No dyspnea. No cough or wheezing, no sputum production. GASTROINTESTINAL: No N/V/D. No abdominal pain, appetite loss, blood in stools. GENITOURINARY: No dysuria, trouble voiding, or hematuria. MUSCULOSKELETAL:  No gait disturbance, weakness or joint complaints. NEUROLOGICAL: No headache, diplopia, change in muscle strength, numbness or tingling. No change in gait, balance, coordination, mood, affect, memory, mentation, behavior. PSHYCH: No anxiety, loss of interest, change in sexual behavior, feelings of self-harm, or confusion. ENDOCRINE: No excessive thirst, fluid intake, or urination. No tremor. HEMATOLOGIC: No abnormal bruising or bleeding. ALLERGY: No nasal congestion or hives.       Physical Examination:     Vitals:    07/09/19 1426   BP: (!) 120/50   Pulse: 60   Weight: 173 lb (78.5 kg) Wt Readings from Last 3 Encounters:   07/09/19 173 lb (78.5 kg)   06/07/19 180 lb 12.8 oz (82 kg)   02/18/19 186 lb (84.4 kg)         General Appearance:  Alert, cooperative, no distress, appears stated age Appropriate weight   Head:  Normocephalic, without obvious abnormality, atraumatic   Eyes:  PERRL, conjunctiva/corneas clear EOM intact  Ears normal   Throat no lesions       Nose: Nares normal, no drainage or sinus tenderness   Throat: Lips, mucosa, and tongue normal   Neck: Supple, symmetrical, trachea midline, no adenopathy, thyroid: not enlarged, symmetric, no tenderness/mass/nodules, no carotid bruit       Lungs:   Clear to auscultation bilaterally, respirations unlabored   Chest Wall:  No tenderness or deformity   Heart:  Irregular rhythm, rate is controlled, S1, S2 normal, there is no murmur, there is no rub or gallop, no jvd, no bilateral lower extremity edema   Abdomen:   Soft, non-tender, bowel sounds active all four quadrants,  no masses, no organomegaly       Extremities: Extremities normal, atraumatic, no cyanosis   Pulses: 2+ and symmetric   Skin: Skin color, texture, turgor normal, no rashes or lesions   Pysch: Normal mood and affect   Neurologic: Normal gross motor and sensory exam.  Cranial nerves intact        Labs:     Lab Results   Component Value Date    WBC 4.7 04/10/2012    HGB 11.2 (L) 10/31/2018    HCT 34.1 (L) 10/31/2018    MCV 89.0 04/10/2012     04/10/2012     Lab Results   Component Value Date     06/07/2019    K 4.9 06/07/2019     06/07/2019    CO2 19 (L) 06/07/2019    BUN 35 (H) 06/07/2019    CREATININE 2.1 (H) 06/07/2019    GLUCOSE 91 06/07/2019    CALCIUM 9.7 06/07/2019    LABALBU 3.8 10/31/2018    LABGLOM 29 (A) 06/07/2019    GFRAA 35 (A) 06/07/2019         No results found for: CHOL  No results found for: TRIG  No results found for: HDL  No results found for: LDLCHOLESTEROL, LDLCALC  No results found for: LABVLDL, VLDL  No results found for: [place]    Lab Results   Component Value Date    INR 1.1 06/18/2011    PROTIME 13.5 06/18/2011       The ASCVD Risk score (Leonela Rogers., et al., 2013) failed to calculate for the following reasons: The 2013 ASCVD risk score is only valid for ages 36 to 78    The patient has a prior MI or stroke diagnosis    Imaging:       Assessment / Plan:      Diagnosis Orders   1. Chronic atrial fibrillation ([address]     2. Essential hypertension     3. Cardiac pacemaker in situ     4. CKD (chronic kidney disease) stage 3, GFR 30-59 ml/min ([facility])     5. Bradycardia     6. Exertional dyspnea     7. Chest pain, unspecified type     8. Localized edema        1. Exertional chest pain and dyspnea:   I cannot exclude underlying ischemia. Echo and luis antonio are unremarkable.      -I have extensively discussed code status with patient and daughter Allyson Lee, patient wishes to remain FULL code but will further discuss it with his family and Dr Jennifer Munoz in the near future.   -Symptoms could have been due to recent device changes and now bradycardia on Toprol. Rate now appears to have recovered despite still taking Toprol 50 daily. I asked him and his daughter to start checking his HR frequently and if HR < 55 bpm, consider decreasing Toprol to 25 daily.     2. CAF:   Per Dr Erna Rios  3. S/p unsuccessful AVJ ablation and PPM implant:   Per Dr Mary Ann Palumbo             Return in about 3 months (around 10/9/2019). With Dr Mary Ann Palumbo. She may follow up with me on a prn basis. I have personally reviewed the reports and images of labs, radiological studies, cardiac studies including ECG's and telemetry, current and old medical records. The note was completed using EMR. Every effort was made to ensure accuracy; however, inadvertent computerized transcription errors may be present. All questions and concerns were addressed to the patient/family. Alternatives to my treatment were discussed.      I would like to thank you for providing me the

## 2019-11-25 ENCOUNTER — TELEPHONE (OUTPATIENT)
Dept: CARDIOLOGY CLINIC | Age: 84
End: 2019-11-25

## 2019-12-04 NOTE — PROGRESS NOTES
tablet TAKE 1 TABLET EVERY DAY 30 tablet 0    aspirin 81 MG chewable tablet Take 81 mg by mouth daily      timolol (TIMOPTIC-XE) 0.5 % ophthalmic gel-forming 1 drop daily      finasteride (PROSCAR) 5 MG tablet       simvastatin (ZOCOR) 40 MG tablet TAKE 1 TABLET IN THE EVENING  2    furosemide (LASIX) 40 MG tablet TAKE 1 TABLET BY MOUTH ON MONDAY, WEDNESDAY AND FRIDAY  3    LUMIGAN 0.01 % SOLN ophthalmic drops        No facility-administered encounter medications on file as of 12/12/2019. Allergies:  Patient has no known allergies. Review of Systems   Constitutional: Negative. HENT: Negative. Eyes: Negative. Respiratory: Negative. Cardiovascular: Negative. Gastrointestinal: Negative. Genitourinary: Negative. Musculoskeletal: Negative. Skin: Negative. Neurological: Negative. Hematological: Negative. Psychiatric/Behavioral: Negative. /60   Pulse 77   Ht 5' 11.5\" (1.816 m)   Wt 173 lb 12.8 oz (78.8 kg)   BMI 23.90 kg/m²     ECG 12/12/19,  Personally reviewed. SR 77    Echo 6/14/19  Summary   Left ventricular cavity size is normal. There is mild concentric left   ventricular hypertrophy. Overall left ventricular systolic function appears   normal with an ejection fraction of 50-55%. No regional wall motion   abnormalities are noted. Indeterminate diastolic function due to afib. The right ventricle is mildly enlarged with mildly depressed systolic   function. Moderate tricuspid regurgitation. MPI 6/20/19  Summary    Overall findings represent a low risk scan.    There is normal isotope uptake at stress and rest. There is no evidence of    myocardial ischemia or scar.    Normal LV size and systolic function.    Normal myocardial perfusion study.  ECG: Non-diagnostic EKG response due to failure to reach target heart rate. Objective:  Physical Exam   Constitutional: He is oriented to person, place, and time.  He appears well-developed and

## 2019-12-12 ENCOUNTER — OFFICE VISIT (OUTPATIENT)
Dept: CARDIOLOGY CLINIC | Age: 84
End: 2019-12-12
Payer: MEDICARE

## 2019-12-12 ENCOUNTER — NURSE ONLY (OUTPATIENT)
Dept: CARDIOLOGY CLINIC | Age: 84
End: 2019-12-12
Payer: MEDICARE

## 2019-12-12 VITALS
SYSTOLIC BLOOD PRESSURE: 132 MMHG | HEART RATE: 77 BPM | BODY MASS INDEX: 23.54 KG/M2 | WEIGHT: 173.8 LBS | DIASTOLIC BLOOD PRESSURE: 60 MMHG | HEIGHT: 72 IN

## 2019-12-12 DIAGNOSIS — R00.1 BRADYCARDIA: ICD-10-CM

## 2019-12-12 DIAGNOSIS — Z95.0 CARDIAC PACEMAKER IN SITU: ICD-10-CM

## 2019-12-12 PROCEDURE — 93000 ELECTROCARDIOGRAM COMPLETE: CPT | Performed by: INTERNAL MEDICINE

## 2019-12-12 PROCEDURE — 99214 OFFICE O/P EST MOD 30 MIN: CPT | Performed by: INTERNAL MEDICINE

## 2019-12-12 PROCEDURE — 93280 PM DEVICE PROGR EVAL DUAL: CPT | Performed by: INTERNAL MEDICINE

## 2020-03-16 ENCOUNTER — TELEPHONE (OUTPATIENT)
Dept: CARDIOLOGY CLINIC | Age: 85
End: 2020-03-16

## 2020-03-16 NOTE — TELEPHONE ENCOUNTER
Pt was scheduled to see FW today. Spoke to pt. He was unaware of appt. R/S pt to May 18 2020. Pt has no complaints or s/s at this time. LVM informing daughter, Loree Esteban, (on [employer]).

## 2020-05-14 NOTE — PROGRESS NOTES
THE EVENING  2    furosemide (LASIX) 40 MG tablet TAKE 1 TABLET BY MOUTH ON MONDAY, WEDNESDAY AND FRIDAY  3    LUMIGAN 0.01 % SOLN ophthalmic drops        No current facility-administered medications on file prior to visit. Past Medical History:   Diagnosis Date    Hypertension     SVT (supraventricular tachycardia) (HCC)         Past Surgical History:   Procedure Laterality Date    CARPAL TUNNEL RELEASE      PROSTATE SURGERY      SINUS SURGERY         No Known Allergies    Social History:  Reviewed. reports that he quit smoking about 25 years ago. His smoking use included pipe and cigars. He has never used smokeless tobacco. He reports current alcohol use. He reports that he does not use drugs. Family History:  Reviewed. family history includes Heart Disease in his sister; Kidney Disease in his sister. Review of System:    · Constitutional: No fevers, chills. · Eyes: No visual changes or diplopia. No scleral icterus. · ENT: No Headaches. No mouth sores or sore throat. · Cardiovascular: No for chest pain, No for dyspnea on exertion, No for palpitations or No for loss of consciousness. No cough, hemoptysis, No for pleuritic pain, or phlebitis. · Respiratory: No for cough or wheezing. No hematemesis. · Gastrointestinal: No abdominal pain, blood in stools. · Genitourinary: No dysuria, or hematuria. · Musculoskeletal: No gait disturbance,    · Integumentary: No rash or pruritis. · Neurological: No headache, change in muscle strength, numbness or tingling. · Psychiatric: No anxiety, or depression. · Endocrine: No temperature intolerance. No excessive thirst, fluid intake, or urination. · Hem/Lymph: No abnormal bruising or bleeding, blood clots or swollen lymph nodes. · Allergic/Immunologic: No nasal congestion or hives.     Physical Examination:  Vitals:    05/18/20 1130   BP: 110/60   Pulse: 84         Wt Readings from Last 3 Encounters:   05/18/20 175 lb (79.4 kg)   12/12/19 173 received education regarding their diagnosis, treatment and medications while in the office today.       Ace Chun [address] [facility]

## 2020-05-18 ENCOUNTER — OFFICE VISIT (OUTPATIENT)
Dept: CARDIOLOGY CLINIC | Age: 85
End: 2020-05-18
Payer: MEDICARE

## 2020-05-18 ENCOUNTER — NURSE ONLY (OUTPATIENT)
Dept: CARDIOLOGY CLINIC | Age: 85
End: 2020-05-18
Payer: MEDICARE

## 2020-05-18 VITALS
BODY MASS INDEX: 24.07 KG/M2 | WEIGHT: 175 LBS | SYSTOLIC BLOOD PRESSURE: 110 MMHG | DIASTOLIC BLOOD PRESSURE: 60 MMHG | HEART RATE: 84 BPM

## 2020-05-18 PROCEDURE — 93000 ELECTROCARDIOGRAM COMPLETE: CPT | Performed by: NURSE PRACTITIONER

## 2020-05-18 PROCEDURE — 93280 PM DEVICE PROGR EVAL DUAL: CPT | Performed by: INTERNAL MEDICINE

## 2020-05-18 PROCEDURE — 99214 OFFICE O/P EST MOD 30 MIN: CPT | Performed by: NURSE PRACTITIONER

## 2020-05-19 NOTE — PROGRESS NOTES
Interrogation and programming evaluation of the device shows normal function, with stable sensing and pacing thresholds. See interrogation for details. The pt saw Luigi Betancourtr NP today. Recheck 3 mos or sooner as needed.

## 2020-06-05 NOTE — LETTER
[address] [phone]  [address],4Th Floor [phone]    Pacemaker/Defibrillator Clinic          08/24/18        Cuca Conrad  [address] [address]        Dear Cuca Conrad    This letter is to inform you that we received the transmission from your monitor at home that checks your pacemaker and/or defibrillator, or implanted heart monitor. The next date your monitor will automatically transmit will be 11/20/18. Please do not send additional routine transmissions unless specifically requested. Your device and monitor are wireless and most transmit cellularly, but please periodically check your monitor is still plugged in to the electrical outlet. If you still use the telephone land line to send please ensure the connection to the phone marleny is secure. This will help to ensure successful automatic transmissions in the future. Also, the monitor needs to be close to you while sleeping at night. Please be aware that the remote device transmission sites are periodically monitored only during regular business hours during which simultaneous in-office device clinics are being run. If your transmission requires attention, we will contact you as soon as possible. Thank you.             [facility] no

## 2020-07-01 ENCOUNTER — APPOINTMENT (OUTPATIENT)
Dept: GENERAL RADIOLOGY | Age: 85
DRG: 291 | End: 2020-07-01
Payer: MEDICARE

## 2020-07-01 ENCOUNTER — HOSPITAL ENCOUNTER (INPATIENT)
Age: 85
LOS: 4 days | Discharge: HOME | DRG: 291 | End: 2020-07-05
Attending: EMERGENCY MEDICINE | Admitting: INTERNAL MEDICINE
Payer: MEDICARE

## 2020-07-01 PROBLEM — I50.23 ACUTE ON CHRONIC SYSTOLIC (CONGESTIVE) HEART FAILURE (HCC): Status: ACTIVE | Noted: 2020-07-01

## 2020-07-01 LAB
ALBUMIN SERPL-MCNC: 3.6 G/DL (ref 3.4–5)
ALP BLD-CCNC: 100 U/L (ref 40–129)
ALT SERPL-CCNC: 12 U/L (ref 10–40)
ANION GAP SERPL CALCULATED.3IONS-SCNC: 13 MMOL/L (ref 3–16)
AST SERPL-CCNC: 23 U/L (ref 15–37)
BASOPHILS ABSOLUTE: 0 K/UL (ref 0–0.2)
BASOPHILS RELATIVE PERCENT: 0.5 %
BILIRUB SERPL-MCNC: 0.8 MG/DL (ref 0–1)
BILIRUBIN DIRECT: 0.3 MG/DL (ref 0–0.3)
BILIRUBIN, INDIRECT: 0.5 MG/DL (ref 0–1)
BUN BLDV-MCNC: 44 MG/DL (ref 7–20)
CALCIUM SERPL-MCNC: 10 MG/DL (ref 8.3–10.6)
CHLORIDE BLD-SCNC: 103 MMOL/L (ref 99–110)
CO2: 19 MMOL/L (ref 21–32)
CREAT SERPL-MCNC: 2.2 MG/DL (ref 0.8–1.3)
EKG ATRIAL RATE: 62 BPM
EKG DIAGNOSIS: NORMAL
EKG Q-T INTERVAL: 462 MS
EKG QRS DURATION: 170 MS
EKG QTC CALCULATION (BAZETT): 498 MS
EKG R AXIS: -61 DEGREES
EKG T AXIS: -7 DEGREES
EKG VENTRICULAR RATE: 70 BPM
EOSINOPHILS ABSOLUTE: 0.1 K/UL (ref 0–0.6)
EOSINOPHILS RELATIVE PERCENT: 2.9 %
FERRITIN: 133 NG/ML (ref 30–400)
GFR AFRICAN AMERICAN: 34
GFR NON-AFRICAN AMERICAN: 28
GLUCOSE BLD-MCNC: 81 MG/DL (ref 70–99)
HCT VFR BLD CALC: 31 % (ref 40.5–52.5)
HCT VFR BLD CALC: 31.4 % (ref 40.5–52.5)
HEMOGLOBIN: 10.2 G/DL (ref 13.5–17.5)
IMMATURE RETIC FRACT: 0.44 (ref 0.21–0.37)
LYMPHOCYTES ABSOLUTE: 0.4 K/UL (ref 1–5.1)
LYMPHOCYTES RELATIVE PERCENT: 10.1 %
MCH RBC QN AUTO: 30.9 PG (ref 26–34)
MCHC RBC AUTO-ENTMCNC: 33.1 G/DL (ref 31–36)
MCV RBC AUTO: 93.5 FL (ref 80–100)
MONOCYTES ABSOLUTE: 0.7 K/UL (ref 0–1.3)
MONOCYTES RELATIVE PERCENT: 17.5 %
NEUTROPHILS ABSOLUTE: 2.6 K/UL (ref 1.7–7.7)
NEUTROPHILS RELATIVE PERCENT: 69 %
PDW BLD-RTO: 16.4 % (ref 12.4–15.4)
PLATELET # BLD: 164 K/UL (ref 135–450)
PMV BLD AUTO: 8.5 FL (ref 5–10.5)
POTASSIUM REFLEX MAGNESIUM: 4.6 MMOL/L (ref 3.5–5.1)
PRO-BNP: 5677 PG/ML (ref 0–449)
RBC # BLD: 3.31 M/UL (ref 4.2–5.9)
RETICULOCYTE ABSOLUTE COUNT: 0.05 M/UL
RETICULOCYTE COUNT PCT: 1.42 % (ref 0.5–2.18)
SODIUM BLD-SCNC: 135 MMOL/L (ref 136–145)
TOTAL PROTEIN: 7 G/DL (ref 6.4–8.2)
TROPONIN: 0.03 NG/ML
TROPONIN: 0.03 NG/ML
TROPONIN: 0.04 NG/ML
WBC # BLD: 3.8 K/UL (ref 4–11)

## 2020-07-01 PROCEDURE — 82728 ASSAY OF FERRITIN: CPT

## 2020-07-01 PROCEDURE — 85045 AUTOMATED RETICULOCYTE COUNT: CPT

## 2020-07-01 PROCEDURE — 2580000003 HC RX 258: Performed by: STUDENT IN AN ORGANIZED HEALTH CARE EDUCATION/TRAINING PROGRAM

## 2020-07-01 PROCEDURE — 84466 ASSAY OF TRANSFERRIN: CPT

## 2020-07-01 PROCEDURE — 99284 EMERGENCY DEPT VISIT MOD MDM: CPT

## 2020-07-01 PROCEDURE — 6370000000 HC RX 637 (ALT 250 FOR IP): Performed by: STUDENT IN AN ORGANIZED HEALTH CARE EDUCATION/TRAINING PROGRAM

## 2020-07-01 PROCEDURE — 85025 COMPLETE CBC W/AUTO DIFF WBC: CPT

## 2020-07-01 PROCEDURE — 36415 COLL VENOUS BLD VENIPUNCTURE: CPT

## 2020-07-01 PROCEDURE — 93005 ELECTROCARDIOGRAM TRACING: CPT | Performed by: EMERGENCY MEDICINE

## 2020-07-01 PROCEDURE — 6360000002 HC RX W HCPCS: Performed by: EMERGENCY MEDICINE

## 2020-07-01 PROCEDURE — 1200000000 HC SEMI PRIVATE

## 2020-07-01 PROCEDURE — 84443 ASSAY THYROID STIM HORMONE: CPT

## 2020-07-01 PROCEDURE — 83540 ASSAY OF IRON: CPT

## 2020-07-01 PROCEDURE — 84484 ASSAY OF TROPONIN QUANT: CPT

## 2020-07-01 PROCEDURE — 96374 THER/PROPH/DIAG INJ IV PUSH: CPT

## 2020-07-01 PROCEDURE — 80048 BASIC METABOLIC PNL TOTAL CA: CPT

## 2020-07-01 PROCEDURE — 71045 X-RAY EXAM CHEST 1 VIEW: CPT

## 2020-07-01 PROCEDURE — 6360000002 HC RX W HCPCS: Performed by: STUDENT IN AN ORGANIZED HEALTH CARE EDUCATION/TRAINING PROGRAM

## 2020-07-01 PROCEDURE — 82306 VITAMIN D 25 HYDROXY: CPT

## 2020-07-01 PROCEDURE — 83880 ASSAY OF NATRIURETIC PEPTIDE: CPT

## 2020-07-01 PROCEDURE — 80076 HEPATIC FUNCTION PANEL: CPT

## 2020-07-01 PROCEDURE — 82607 VITAMIN B-12: CPT

## 2020-07-01 RX ORDER — SODIUM CHLORIDE 0.9 % (FLUSH) 0.9 %
10 SYRINGE (ML) INJECTION EVERY 12 HOURS SCHEDULED
Status: CANCELLED | OUTPATIENT
Start: 2020-07-01

## 2020-07-01 RX ORDER — LISINOPRIL 10 MG/1
10 TABLET ORAL DAILY
Status: DISCONTINUED | OUTPATIENT
Start: 2020-07-02 | End: 2020-07-01

## 2020-07-01 RX ORDER — FUROSEMIDE 10 MG/ML
40 INJECTION INTRAMUSCULAR; INTRAVENOUS 2 TIMES DAILY
Status: CANCELLED | OUTPATIENT
Start: 2020-07-01

## 2020-07-01 RX ORDER — POLYETHYLENE GLYCOL 3350 17 G/17G
17 POWDER, FOR SOLUTION ORAL DAILY PRN
Status: DISCONTINUED | OUTPATIENT
Start: 2020-07-01 | End: 2020-07-04

## 2020-07-01 RX ORDER — ACETAMINOPHEN 650 MG/1
650 SUPPOSITORY RECTAL EVERY 6 HOURS PRN
Status: DISCONTINUED | OUTPATIENT
Start: 2020-07-01 | End: 2020-07-05 | Stop reason: HOSPADM

## 2020-07-01 RX ORDER — ATORVASTATIN CALCIUM 40 MG/1
40 TABLET, FILM COATED ORAL DAILY
Status: CANCELLED | OUTPATIENT
Start: 2020-07-01

## 2020-07-01 RX ORDER — FAMOTIDINE 20 MG/1
20 TABLET, FILM COATED ORAL DAILY
Status: DISCONTINUED | OUTPATIENT
Start: 2020-07-01 | End: 2020-07-05 | Stop reason: HOSPADM

## 2020-07-01 RX ORDER — SODIUM CHLORIDE 0.9 % (FLUSH) 0.9 %
10 SYRINGE (ML) INJECTION EVERY 12 HOURS SCHEDULED
Status: DISCONTINUED | OUTPATIENT
Start: 2020-07-01 | End: 2020-07-05 | Stop reason: HOSPADM

## 2020-07-01 RX ORDER — PROMETHAZINE HYDROCHLORIDE 12.5 MG/1
12.5 TABLET ORAL EVERY 6 HOURS PRN
Status: DISCONTINUED | OUTPATIENT
Start: 2020-07-01 | End: 2020-07-05 | Stop reason: HOSPADM

## 2020-07-01 RX ORDER — METOPROLOL SUCCINATE 50 MG/1
1 TABLET, EXTENDED RELEASE ORAL DAILY
Status: CANCELLED | OUTPATIENT
Start: 2020-07-01

## 2020-07-01 RX ORDER — ONDANSETRON 2 MG/ML
4 INJECTION INTRAMUSCULAR; INTRAVENOUS EVERY 6 HOURS PRN
Status: DISCONTINUED | OUTPATIENT
Start: 2020-07-01 | End: 2020-07-05 | Stop reason: HOSPADM

## 2020-07-01 RX ORDER — SODIUM CHLORIDE 0.9 % (FLUSH) 0.9 %
10 SYRINGE (ML) INJECTION PRN
Status: DISCONTINUED | OUTPATIENT
Start: 2020-07-01 | End: 2020-07-05 | Stop reason: HOSPADM

## 2020-07-01 RX ORDER — METOPROLOL SUCCINATE 25 MG/1
25 TABLET, EXTENDED RELEASE ORAL DAILY
Status: DISCONTINUED | OUTPATIENT
Start: 2020-07-02 | End: 2020-07-05 | Stop reason: HOSPADM

## 2020-07-01 RX ORDER — FINASTERIDE 5 MG/1
5 TABLET, FILM COATED ORAL DAILY
Status: CANCELLED | OUTPATIENT
Start: 2020-07-01

## 2020-07-01 RX ORDER — SODIUM CHLORIDE 0.9 % (FLUSH) 0.9 %
10 SYRINGE (ML) INJECTION PRN
Status: CANCELLED | OUTPATIENT
Start: 2020-07-01

## 2020-07-01 RX ORDER — METOPROLOL SUCCINATE 50 MG/1
50 TABLET, EXTENDED RELEASE ORAL DAILY
Status: DISCONTINUED | OUTPATIENT
Start: 2020-07-02 | End: 2020-07-01

## 2020-07-01 RX ORDER — TIMOLOL MALEATE 5 MG/ML
1 SOLUTION/ DROPS OPHTHALMIC NIGHTLY
Status: DISCONTINUED | OUTPATIENT
Start: 2020-07-01 | End: 2020-07-05 | Stop reason: HOSPADM

## 2020-07-01 RX ORDER — METOPROLOL SUCCINATE 25 MG/1
25 TABLET, EXTENDED RELEASE ORAL DAILY
Status: CANCELLED | OUTPATIENT
Start: 2020-07-01

## 2020-07-01 RX ORDER — LISINOPRIL 10 MG/1
10 TABLET ORAL DAILY
Status: CANCELLED | OUTPATIENT
Start: 2020-07-01

## 2020-07-01 RX ORDER — LISINOPRIL 10 MG/1
10 TABLET ORAL DAILY
Status: DISCONTINUED | OUTPATIENT
Start: 2020-07-01 | End: 2020-07-01

## 2020-07-01 RX ORDER — FUROSEMIDE 10 MG/ML
40 INJECTION INTRAMUSCULAR; INTRAVENOUS ONCE
Status: COMPLETED | OUTPATIENT
Start: 2020-07-01 | End: 2020-07-01

## 2020-07-01 RX ORDER — LISINOPRIL 5 MG/1
5 TABLET ORAL DAILY
Status: CANCELLED | OUTPATIENT
Start: 2020-07-01

## 2020-07-01 RX ORDER — FUROSEMIDE 10 MG/ML
40 INJECTION INTRAMUSCULAR; INTRAVENOUS 2 TIMES DAILY
Status: DISCONTINUED | OUTPATIENT
Start: 2020-07-02 | End: 2020-07-03

## 2020-07-01 RX ORDER — FUROSEMIDE 40 MG/1
80 TABLET ORAL ONCE
Status: CANCELLED | OUTPATIENT
Start: 2020-07-01

## 2020-07-01 RX ORDER — ATORVASTATIN CALCIUM 20 MG/1
20 TABLET, FILM COATED ORAL DAILY
Status: DISCONTINUED | OUTPATIENT
Start: 2020-07-01 | End: 2020-07-05 | Stop reason: HOSPADM

## 2020-07-01 RX ORDER — TIMOLOL MALEATE 5 MG/ML
1 SOLUTION/ DROPS OPHTHALMIC DAILY
Status: CANCELLED | OUTPATIENT
Start: 2020-07-01

## 2020-07-01 RX ORDER — FUROSEMIDE 10 MG/ML
40 INJECTION INTRAMUSCULAR; INTRAVENOUS DAILY
Status: DISCONTINUED | OUTPATIENT
Start: 2020-07-01 | End: 2020-07-01

## 2020-07-01 RX ORDER — ACETAMINOPHEN 325 MG/1
650 TABLET ORAL EVERY 6 HOURS PRN
Status: DISCONTINUED | OUTPATIENT
Start: 2020-07-01 | End: 2020-07-05 | Stop reason: HOSPADM

## 2020-07-01 RX ORDER — FINASTERIDE 5 MG/1
5 TABLET, FILM COATED ORAL DAILY
Status: DISCONTINUED | OUTPATIENT
Start: 2020-07-02 | End: 2020-07-05 | Stop reason: HOSPADM

## 2020-07-01 RX ADMIN — Medication 10 ML: at 20:33

## 2020-07-01 RX ADMIN — TIMOLOL MALEATE 1 DROP: 5 SOLUTION OPHTHALMIC at 20:32

## 2020-07-01 RX ADMIN — FUROSEMIDE 40 MG: 10 INJECTION, SOLUTION INTRAMUSCULAR; INTRAVENOUS at 16:00

## 2020-07-01 RX ADMIN — ATORVASTATIN CALCIUM 20 MG: 20 TABLET, FILM COATED ORAL at 18:41

## 2020-07-01 RX ADMIN — FAMOTIDINE 20 MG: 20 TABLET ORAL at 18:41

## 2020-07-01 RX ADMIN — FUROSEMIDE 40 MG: 10 INJECTION, SOLUTION INTRAMUSCULAR; INTRAVENOUS at 18:41

## 2020-07-01 RX ADMIN — APIXABAN 2.5 MG: 2.5 TABLET, FILM COATED ORAL at 20:32

## 2020-07-01 ASSESSMENT — PAIN SCALES - GENERAL
PAINLEVEL_OUTOF10: 0

## 2020-07-01 NOTE — PROGRESS NOTES
Patient arrived to room 6316. Alert and oriented x4. Vitals stable. Oriented to room. Call light and personal belongings within reach. Fall precautions initiated. Will continue to monitor.

## 2020-07-01 NOTE — PROGRESS NOTES
Pharmacy Note - Renal Dosing- Famotidine    Famotidine 20 mg BID  ordered for patient. This medication is renally eliminated. Will change to 20 mg once daily per renal dose adjustment policy. Estimated Creatinine Clearance: 20 mL/min (A) (based on SCr of 2.2 mg/dL (H)). Pharmacy will continue to monitor renal function and adjust dose as necessary. Please call with any questions. Please call with questions:  [phone] ([email or web address])   [phone] ([address] [place] [address])    Eduardo Jameson. Emmett Crew. MICHELLE   7/1/2020 6:14 PM

## 2020-07-01 NOTE — ED NOTES
Report called to receiving RN on 6S. Patient A&Ox4, ready for transportation to room 6316.      Cait Calixto RN  07/01/20 5290

## 2020-07-01 NOTE — ACP (ADVANCE CARE PLANNING)
the patient desire the use of ventilator (breathing machine)?: yes    \"If your health worsens and it becomes clear that your chance of recovery is unlikely, what would your preference be about the use of a ventilator (breathing machine) if it were available to you? \"     Would the patient desire the use of ventilator (breathing machine)?: Yes      Resuscitation  \"CPR works best to restart the heart when there is a sudden event, like a heart attack, in someone who is otherwise healthy. Unfortunately, CPR does not typically restart the heart for people who have serious health conditions or who are very sick. \"    \"In the event your heart stopped as a result of an underlying serious health condition, would you want attempts to be made to restart your heart (answer \"yes\" for attempt to resuscitate) or would you prefer a natural death (answer \"no\" for do not attempt to resuscitate)? \" unsure      NOTE: If the patient has a valid advance directive AND now provides care preference(s) that are inconsistent with that prior directive, advise the patient to consider either: creating a new advance directive that complies with state-specific requirements; or, if that is not possible, orally revoking that prior directive in accordance with state-specific requirements, which must be documented in the EHR. [] Yes   [] No   Educated Patient / Era Moots regarding differences between Advance Directives and portable DNR orders.     Length of ACP Conversation in minutes:      Conversation Outcomes:  [x] ACP discussion completed  [] Existing advance directive reviewed with patient; no changes to patient's previously recorded wishes  [] New Advance Directive completed  [] Portable Do Not Rescitate prepared for Provider review and signature  [] POLST/POST/MOLST/MOST prepared for Provider review and signature      Follow-up plan:    [] Schedule follow-up conversation to continue planning  [] Referred individual to Provider for

## 2020-07-01 NOTE — PROGRESS NOTES
Clinical Pharmacy Progress Note  Medication History     Admit Date: 7/1/2020    List of current medications patient is taking is complete. Home medication list in EPIC updated to reflect changes noted below. Source of information: outpatient fill records, [facility] pharmacist     The following medications were removed from admission medication list:  Lumigan 0.01% eye drops -- has not filled in >2 years per [facility]    Please note:  Patient states he uses eye gtts at home, however per [facility] last fill of timolol was in 2019 for 2 month supply. Please call with questions.   Nadia BelleD, [address] [place] [address] Pharmacy: [phone]  [address] [place] [address]: [phone]  7/1/2020 3:28 PM

## 2020-07-01 NOTE — ED NOTES
Louann Bay from Dr. Kimberley Johnson office for edema to all extremities and recent 10 lbs weight gain.      Jabari Quiñones, NANCY  07/01/20 0575

## 2020-07-01 NOTE — PROGRESS NOTES
4 Eyes Admission Assessment     I agree as the admission nurse that 2 RN's have performed a thorough Head to Toe Skin Assessment on the patient. ALL assessment sites listed below have been assessed on admission. Areas assessed by both nurses: Neldon Conn  [x]   Head, Face, and Ears   [x]   Shoulders, Back, and Chest  [x]   Arms, Elbows, and Hands   [x]   Coccyx, Sacrum, and Ischum  [x]   Legs, Feet, and Heels        Does the Patient have Skin Breakdown? No, the patient doesn't have any skin breakdown noted at this time.           Jose Prevention initiated:  Yes   Wound Care Orders initiated:  No      [facility] nurse consulted for Pressure Injury (Stage 3,4, Unstageable, DTI, NWPT, and Complex wounds):  No      Nurse 1 eSignature: Electronically signed by Eulogio Patel RN on 7/1/20 at 6:25 PM EDT    **SHARE this note so that the co-signing nurse is able to place an eSignature**    Nurse 2 eSignature: Electronically signed by Cj Rodriguez RN on 7/1/20 at 6:27 PM EDT

## 2020-07-01 NOTE — H&P
Internal Medicine PGY-1 Resident History & Physical      PCP: Geetha Rm MD    Date of Admission: 7/1/2020    Date of Service: Pt seen/examined on 7/01/2020 and Admitted to Inpatient    Chief Complaint:  Edema in all extremities and recent 10 lbs weight gain      History Of Present Illness:      80 y.o. male who presented to [facility] from Dr. Irais Samuels office for edema in all extremities and recent 10 lbs weight gain. Pt states that he noted the swelling in his knees and feet about 1 year ago, but it has gotten worse over the past 2-3 months, and is now present in the upper extremities, lower extremities, and in the groin area. Dr. Vinh Delgado started him on lasix for the swelling, and he has been taking the medication regularly as prescribed. His left arm is swollen more than the right arm and when asked, he mentioned that he noted that the increased swelling in the left arm started after a fall 6 weeks ago - he states that he was trying to reach something from the floor and fell, and denies passing out. He denies any history of previous swelling episodes. Denies eating salty food. Pt reports that he has shortness of breath with exertion only that has been chronic for a few years and sleeps with 2 pillows at night. He has chronic cough only in the morning productive of brownish sputum. Pt reports right upper chest pain that he gets 'once in a while' that comes and goes with walking. Past Medical History:          Diagnosis Date    Hypertension     SVT (supraventricular tachycardia) (HCC)        Past Surgical History:          Procedure Laterality Date    CARPAL TUNNEL RELEASE      PROSTATE SURGERY      SINUS SURGERY         Medications Prior to Admission:      Prior to Admission medications    Medication Sig Start Date End Date Taking?  Authorizing Provider   apixaban (ELIQUIS) 2.5 MG TABS tablet Take 1 tablet by mouth 2 times daily 12/12/19  Yes Kirill Ortiz MD   lisinopril (PRINIVIL;ZESTRIL) 10 MG tablet Take 1 tablet by mouth daily 10/5/17  Yes Doyle Chi MD   metoprolol succinate (TOPROL XL) 50 MG extended release tablet TAKE 1 TABLET EVERY DAY 10/5/17  Yes Doyle Chi MD   finasteride (PROSCAR) 5 MG tablet Take 5 mg by mouth daily  3/5/17  Yes Historical Provider, MD   simvastatin (ZOCOR) 40 MG tablet TAKE 1 TABLET IN THE EVENING 1/5/17  Yes Historical Provider, MD   furosemide (LASIX) 40 MG tablet TAKE 1 TABLET BY MOUTH ON MONDAY, [facility] [place] AND FRIDAY 1/17/17  Yes Historical Provider, MD   timolol (TIMOPTIC-XE) 0.5 % ophthalmic gel-forming Place 1 drop into both eyes nightly     Historical Provider, MD       Allergies:  Patient has no known allergies. Social History:      The patient currently lives alone at a [employer] building. TOBACCO:   reports that he quit smoking about 25 years ago. His smoking use included pipe and cigars. He has never used smokeless tobacco. He smoked for 40 yrs. ETOH:  reports current alcohol use. reports using alcohol 'once in a while'      Family History:     Pt reports unspecified heart problems present in the family and diabetes in granddaughter, but denies any hx of cancers or strokes running in the family. Problem Relation Age of Onset    Kidney Disease Sister     Heart Disease Sister        REVIEW OF SYSTEMS: Pertinent positives as noted in the HPI. All other systems reviewed and negative. ROS: Review of Systems    PHYSICAL EXAM PERFORMED:    BP (!) 147/89   Pulse 77   Temp 98.6 °F (37 °C) (Oral)   Resp 22   Ht 5' 11.5\" (1.816 m)   Wt 190 lb 4.8 oz (86.3 kg)   SpO2 97%   BMI 26.17 kg/m²     General appearance:  No apparent distress, appears stated age and cooperative. HEENT:  Normal cephalic,atraumatic without obvious deformity. Pupils equal, round, and reactive to light. Extra ocular muscles intact. Conjunctivae/corneas clear. Shannan Yu Respiratory:  Normal respiratory effort.  Clear to auscultation, bilaterally without Rales/Wheezes/Rhonchi. Cardiovascular:  Pacemaker in place  Abdomen: Soft, non-tender, non-distended with normal bowel sounds. Musculoskeletal: 2+ edema in lower extremities bilaterally; 2+ edema in left upper extremity; 1+ edema in right upper extremity; 1+ groin edema. Skin: Skin color, texture, turgor normal.  Norashes or lesions. Neurologic:  Neurovascularly intact without any focal sensory/motor deficits. Cranialnerves: II-XII intact, grossly non-focal.  Psychiatric:  Alert and oriented, thought content appropriate,normal insight      Labs:     Recent Labs     07/01/20  1450 07/01/20  1829   WBC 3.8*  --    HGB 10.2*  --    HCT 31.0* 31.4*     --      Recent Labs     07/01/20  1450   *   K 4.6      CO2 19*   BUN 44*   CREATININE 2.2*   CALCIUM 10.0     Recent Labs     07/01/20  1450   AST 23   ALT 12   BILIDIR 0.3   BILITOT 0.8   ALKPHOS 100     No results for input(s): INR in the last 72 hours. Recent Labs     07/01/20  1450 07/01/20  1829   TROPONINI 0.04* 0.03*       Urinalysis:      Lab Results   Component Value Date    NITRU Negative 06/19/2011    WBCUA 0-3 04/10/2012    BACTERIA Rare 04/10/2012    RBCUA TNTC 04/10/2012    BLOODU Negative 06/19/2011    SPECGRAV <=1.005 06/19/2011    GLUCOSEU Negative 06/19/2011       Radiology:       XR CHEST PORTABLE   Final Result      No acute disease         VL Extremity Venous Left    (Results Pending)       ASSESSMENT & PLAN:  Noe Rutledge is a 80 y.o. male p/w edema to all extremities and recent 10 lbs weight gain. Active Hospital Problems    Diagnosis Date Noted    Acute on chronic systolic (congestive) heart failure (HCC) [I50.23] 07/01/2020       Acute on chronic CHF exacerbation  - presentation indicative of diastolic heart failure, likely due to afib  - on echo from 6/14/2019 left ventricular systolic function appeared   normal with an ejection fraction of 50-55%.  No regional wall motion   abnormalities were noted. Indeterminate diastolic function due to afib. The right ventricle was mildly enlarged with mildly depressed systolic   function.  Moderate tricuspid regurgitation was noted  - f/u echo  - pro-BNP 5,677  - dry weight 175 lbs  - continue metoprolol 25mg  - telemetry  - daily weights  - daily I/O  - f/u TSH  - 40mg IV lasix BID (home dose 40mg PO every other day)  - cardiology consult placed    CKD stage 3b  - f/u Cr - recheck creatinine in the AM to make sure not affected by diuresis  - consider future nephrology consult depending on Cr levels  - f/u Mg  - hold lisinopril    Left upper extremity swelling  - f/u duplex u/s    Chronic problems  Anemia  - f/u Fe, B12, folate  Afib  Sick Sinus Syndrome s/p pacemaker placement  - Eliquis  CKD  HTN      DVT Prophylaxis: Eliquis  Diet: DIET LOW SODIUM 2 GM;  Code Status: Full Code    PT/OT Eval Status: will consult PT/OT to evaluate before discharge    Dispo - pending cardiology workup and consultation      I will discuss the patient with the senior resident and MD Tiffany Barrett MD  Internal Medicine Resident, PGY-1

## 2020-07-02 ENCOUNTER — APPOINTMENT (OUTPATIENT)
Dept: VASCULAR LAB | Age: 85
DRG: 291 | End: 2020-07-02
Payer: MEDICARE

## 2020-07-02 LAB
ANION GAP SERPL CALCULATED.3IONS-SCNC: 9 MMOL/L (ref 3–16)
BILIRUBIN URINE: NEGATIVE
BLOOD, URINE: NEGATIVE
BUN BLDV-MCNC: 47 MG/DL (ref 7–20)
CALCIUM SERPL-MCNC: 9.7 MG/DL (ref 8.3–10.6)
CHLORIDE BLD-SCNC: 105 MMOL/L (ref 99–110)
CHOLESTEROL, TOTAL: 88 MG/DL (ref 0–199)
CLARITY: CLEAR
CO2: 23 MMOL/L (ref 21–32)
COLOR: YELLOW
CREAT SERPL-MCNC: 2.3 MG/DL (ref 0.8–1.3)
GFR AFRICAN AMERICAN: 32
GFR NON-AFRICAN AMERICAN: 26
GLUCOSE BLD-MCNC: 97 MG/DL (ref 70–99)
GLUCOSE URINE: NEGATIVE MG/DL
HDLC SERPL-MCNC: 38 MG/DL (ref 40–60)
IRON SATURATION: 32 % (ref 20–50)
IRON: 75 UG/DL (ref 59–158)
KETONES, URINE: NEGATIVE MG/DL
LDL CHOLESTEROL CALCULATED: 38 MG/DL
LEUKOCYTE ESTERASE, URINE: NEGATIVE
LV EF: 58 %
LVEF MODALITY: NORMAL
MAGNESIUM: 2.1 MG/DL (ref 1.8–2.4)
MICROSCOPIC EXAMINATION: NORMAL
NITRITE, URINE: NEGATIVE
PARATHYROID HORMONE INTACT: 47.7 PG/ML (ref 14–72)
PH UA: 6.5 (ref 5–8)
POTASSIUM SERPL-SCNC: 4.4 MMOL/L (ref 3.5–5.1)
PROTEIN UA: NEGATIVE MG/DL
SODIUM BLD-SCNC: 137 MMOL/L (ref 136–145)
SPECIFIC GRAVITY UA: 1.01 (ref 1–1.03)
TOTAL IRON BINDING CAPACITY: 236 UG/DL (ref 260–445)
TRANSFERRIN: 213 MG/DL (ref 200–360)
TRIGL SERPL-MCNC: 60 MG/DL (ref 0–150)
TSH REFLEX: 1.73 UIU/ML (ref 0.27–4.2)
URINE REFLEX TO CULTURE: NORMAL
URINE TYPE: NORMAL
UROBILINOGEN, URINE: 0.2 E.U./DL
VITAMIN B-12: 363 PG/ML (ref 211–911)
VITAMIN D 25-HYDROXY: 26.8 NG/ML
VLDLC SERPL CALC-MCNC: 12 MG/DL

## 2020-07-02 PROCEDURE — 6370000000 HC RX 637 (ALT 250 FOR IP): Performed by: STUDENT IN AN ORGANIZED HEALTH CARE EDUCATION/TRAINING PROGRAM

## 2020-07-02 PROCEDURE — 2580000003 HC RX 258: Performed by: STUDENT IN AN ORGANIZED HEALTH CARE EDUCATION/TRAINING PROGRAM

## 2020-07-02 PROCEDURE — 80048 BASIC METABOLIC PNL TOTAL CA: CPT

## 2020-07-02 PROCEDURE — 99222 1ST HOSP IP/OBS MODERATE 55: CPT | Performed by: INTERNAL MEDICINE

## 2020-07-02 PROCEDURE — 81003 URINALYSIS AUTO W/O SCOPE: CPT

## 2020-07-02 PROCEDURE — 83735 ASSAY OF MAGNESIUM: CPT

## 2020-07-02 PROCEDURE — 6360000002 HC RX W HCPCS: Performed by: STUDENT IN AN ORGANIZED HEALTH CARE EDUCATION/TRAINING PROGRAM

## 2020-07-02 PROCEDURE — 83970 ASSAY OF PARATHORMONE: CPT

## 2020-07-02 PROCEDURE — 80061 LIPID PANEL: CPT

## 2020-07-02 PROCEDURE — 93971 EXTREMITY STUDY: CPT

## 2020-07-02 PROCEDURE — 1200000000 HC SEMI PRIVATE

## 2020-07-02 PROCEDURE — 83883 ASSAY NEPHELOMETRY NOT SPEC: CPT

## 2020-07-02 PROCEDURE — 36415 COLL VENOUS BLD VENIPUNCTURE: CPT

## 2020-07-02 RX ADMIN — APIXABAN 2.5 MG: 2.5 TABLET, FILM COATED ORAL at 21:33

## 2020-07-02 RX ADMIN — APIXABAN 2.5 MG: 2.5 TABLET, FILM COATED ORAL at 07:51

## 2020-07-02 RX ADMIN — Medication 10 ML: at 21:33

## 2020-07-02 RX ADMIN — METOPROLOL SUCCINATE 25 MG: 25 TABLET, EXTENDED RELEASE ORAL at 07:51

## 2020-07-02 RX ADMIN — FINASTERIDE 5 MG: 5 TABLET, FILM COATED ORAL at 07:50

## 2020-07-02 RX ADMIN — FUROSEMIDE 40 MG: 10 INJECTION, SOLUTION INTRAMUSCULAR; INTRAVENOUS at 06:15

## 2020-07-02 RX ADMIN — Medication 10 ML: at 07:51

## 2020-07-02 RX ADMIN — TIMOLOL MALEATE 1 DROP: 5 SOLUTION OPHTHALMIC at 21:33

## 2020-07-02 RX ADMIN — FUROSEMIDE 40 MG: 10 INJECTION, SOLUTION INTRAMUSCULAR; INTRAVENOUS at 19:13

## 2020-07-02 RX ADMIN — FAMOTIDINE 20 MG: 20 TABLET ORAL at 07:51

## 2020-07-02 RX ADMIN — ATORVASTATIN CALCIUM 20 MG: 20 TABLET, FILM COATED ORAL at 07:51

## 2020-07-02 ASSESSMENT — PAIN SCALES - GENERAL
PAINLEVEL_OUTOF10: 0

## 2020-07-02 NOTE — CARE COORDINATION
Case Management Assessment           Initial Evaluation                Date / Time of Evaluation: 7/2/2020 2:55 PM                 Assessment Completed by: Jennifer Waite    Patient Name: Mallorie Lomas     YOB: 1921  Diagnosis: Acute on chronic systolic (congestive) heart failure ([facility] [address] [I50.23]  Acute on chronic systolic (congestive) heart failure ([facility] [address] [facility] [I50.23]     Date / Time: 7/1/2020  2:32 PM    Patient Admission Status: Inpatient    If patient is discharged prior to next notation, then this note serves as note for discharge by case management. Current PCP: Dorothy Gilmore MD  Clinic Patient: No    Chart Reviewed: Yes  Patient/ Family Interviewed: Yes    Initial assessment completed at bedside with: patient    Additional Case Management Notes: Patient brought to [facility] ED for increased fluid retention, edema, and weight gain of 10#. Cardiology is following patient. Patient states that he is independent at home and has assist by daughter when needed. He uses [facility] for MOW and transport to doctor's appointments. Patient states that he is expecting to return home to his apartment when discharged. Patient has no steps and lives on third floor apartment with elevator. He denies need for any home care.        Hospitalization in the last 30 days: No    Emergency Contacts:  Extended Emergency Contact Information  Primary Emergency Contact: [address] [facility],[address] Phone: [phone]  Relation: Child    Advance Directives:   Code Status: Full [address]: no    Financial  Payor: Georgette Dooley / Plan: Vibha Munoz PPO / Product Type: Medicare /     Nuzhat Abt required for SNF: Yes    Pharmacy    [facility]/pharmacy [address] [address]  Phone: [phone] Fax: [phone]      Potential assistance Purchasing Medications: Potential Assistance Purchasing Medications: No  Does Patient want to participate in local refill/ meds to beds program?: Not Assessed    Meds To Beds General Rules:  1. Can ONLY be done Monday- Friday between 8:30am-5pm  2. Prescription(s) must be in pharmacy by 3pm to be filled same day  3. Copy of patient's insurance/ prescription drug card and patient face sheet must be sent along with the prescription(s)  4. Cost of Rx cannot be added to hospital bill. If financial assistance is needed, please contact unit  or ;  or  CANNOT provide pharmacy voucher for patients co-pays  5.  Patients can then  the prescription on their way out of the hospital at discharge, or pharmacy can deliver to the bedside if staff is available. (payment due at time of pick-up or delivery - cash, check, or card accepted)     Able to afford home medications/ co-pay costs: Yes    ADLS  Support Systems: Family Members, Friends/Neighbors    PT AM-PAC:   /24  OT AM-PAC:   /24    [place]: apartment with elevator      Plans to RETURN to current housing: Yes  Barriers to RETURNING to current housing: none    [address]  Currently ACTIVE with [employer] [address]: No  Home Care Agency: Not Applicable    Currently ACTIVE with [place] on Aging: Yes  Passport/ Waiver: Yes  Passport/ Waiver Services: Meals on Wheels and [address]  DME Provider: none  DISCHARGE PLAN:  Disposition: Home- No Services Needed    Transportation PLAN for discharge: family     Factors facilitating achievement of predicted outcomes: Family support, Friend support, Motivated, Cooperative, Pleasant, Sense of humor and Good insight into deficits    Barriers to discharge: Medical complications      The Plan for Transition of Care is related to the following treatment goals of Acute on chronic systolic (congestive) heart failure (HCC) [I50.23]  Acute on chronic systolic (congestive) heart failure [facility]) [I50.23]    The Patient and/or patient representative Rupinder Campos and his family were provided with a choice of provider and agrees with the discharge plan Yes    Freedom of choice list was provided with basic dialogue that supports the patient's individualized plan of care/goals and shares the quality data associated with the providers.  Yes    Care Transition patient: No    Almaz Damico RN  [facility] [employer]  Case Management Department  Ph: [phone]

## 2020-07-02 NOTE — CONSULTS
throat. CARDIOVASCULAR: No chest pain/chest pressure/chest discomfort. No palpitations. + edema. RESPIRATORY: + dyspnea. No cough or wheezing, no sputum production. GASTROINTESTINAL: No N/V/D. No abdominal pain, appetite loss, blood in stools. GENITOURINARY: No dysuria, trouble voiding, or hematuria. MUSCULOSKELETAL:  No gait disturbance, weakness or joint complaints. NEUROLOGICAL: No headache, diplopia, change in muscle strength, numbness or tingling. No change in gait, balance, coordination, mood, affect, memory, mentation, behavior. ENDOCRINE: No excessive thirst, fluid intake, or urination. No tremor. HEMATOLOGIC: No abnormal bruising or bleeding. ALLERGY: No nasal congestion or hives. Physical Examination:     Vitals:    07/02/20 0743 07/02/20 0749 07/02/20 1004 07/02/20 1052   BP: 122/72      Pulse: 90 87  90   Resp: 20      Temp: 98 °F (36.7 °C)      TempSrc: Oral      SpO2: 100%  100%    Weight:       Height:           Wt Readings from Last 3 Encounters:   07/02/20 182 lb 11.2 oz (82.9 kg)   05/18/20 175 lb (79.4 kg)   12/12/19 173 lb 12.8 oz (78.8 kg)         General Appearance:  Alert, cooperative, no distress, appears stated age Appropriate weight   Head:  Normocephalic, without obvious abnormality, atraumatic   Eyes:  PERRL, conjunctiva/corneas clear EOM intact  Ears normal   Throat no lesions       Nose: Nares normal, no drainage or sinus tenderness   Throat: Lips, mucosa, and tongue normal   Neck: Supple, symmetrical, trachea midline, no adenopathy, thyroid: not enlarged, symmetric, no tenderness/mass/nodules, no carotid bruit. Lungs:   Respirations unlabored, basilar mild ronchi.     Chest Wall:  No tenderness or deformity   Heart:  Irregular rhythm, rate is controlled, S1, S2 normal, there is no murmur, there is no rub or gallop, + jvd, 2+ bilateral lower extremity edema   Abdomen:   Soft, non-tender, bowel sounds active all four quadrants,  no masses, no organomegaly Extremities: Extremities normal, atraumatic, no cyanosis. Pulses: 2+ and symmetric   Skin: Skin color, texture, turgor normal, no rashes or lesions   Pysch: Normal mood and affect   Neurologic: Normal gross motor and sensory exam.  Cranial nerves intact        Labs:     Recent Labs     07/01/20  1450 07/02/20  0606   * 137   K 4.6 4.4   BUN 44* 47*   CREATININE 2.2* 2.3*    105   CO2 19* 23   GLUCOSE 81 97   CALCIUM 10.0 9.7   MG  --  2.10     Recent Labs     07/01/20  1450 07/01/20  1829   WBC 3.8*  --    HGB 10.2*  --    HCT 31.0* 31.4*     --    MCV 93.5  --      No results for input(s): CHOLTOT, TRIG, HDL in the last 72 hours. Invalid input(s): LIPIDCOMM, CHOLHDL, VLDCHOL, LDL  No results for input(s): PTT, INR in the last 72 hours. Invalid input(s): PT  Recent Labs     07/01/20  1450 07/01/20  1829 07/01/20  2100   TROPONINI 0.04* 0.03* 0.03*     No results for input(s): BNP in the last 72 hours. No results for input(s): TSH in the last 72 hours. No results for input(s): CHOL, HDL, LDLCALC, TRIG in the last 72 hours.]    Lab Results   Component Value Date    CKTOTAL 71 06/19/2011    CKMB 1.4 06/19/2011    TROPONINI 0.03 (H) 07/01/2020         Imaging:     Telemetry:  afib with CVR      Assessment / Plan:     1. Acute on chronic HFpEF:  Patient presented with volume overload but hemodynamically stable. His atrial fibrillation is rate controlled. - Continue with Lasix 40 mg IV twice daily  - Strict I's and O's every shift and standing weights if possible, low-salt diet and daily BMP. - Continue with Toprol for rate control.  - Agree with echo this admission.      2. CAF:   Per Dr Sergio Brown    -Rate controlled with Toprol 25 daily  -On Eliquis renally dosed     3.  S/p unsuccessful AVJ ablation and PPM implant:   Per Dr Sergio Brown        I have personally reviewed the reports and images of labs, radiological studies, cardiac studies including ECG's and telemetry, current and old medical records. The note was completed using EMR and Dragon dictation system. Every effort was made to ensure accuracy; however, inadvertent computerized transcription errors may be present. All questions and concerns were addressed to the patient/family. Alternatives to my treatment were discussed. I would like to thank you for providing me the opportunity to participate in the care of your patient. If you have any questions, please do not hesitate to contact me.      Aisha Christianson MD, [facility], [address]  [facility] [address] [address]  Ph: [phone]  Fax: [phone]

## 2020-07-02 NOTE — CONSULTS
Nutrition Education    Type and Reason for Visit: Initial, Consult, Patient Education    Nutrition Assessment:  Consult received for diet education. Pt reports that he gets Meals on Wheels (low sodium) and sometimes uses canned foods for convenience. Pt reports using seasoning salt. Discussed avoiding adding salt to meals and watching canned foods and choosing No salt added cans. Pt states that he weighs himself often, encouraged daily weights. Pt voiced having a good appetite. Will monitor per [facility].      · Verbally reviewed information with Patient    Time spent: 10 minutes     Electronically signed by Elsa Conley RD, LD on 7/2/20 at 9:18 AM EDT    Contact Number: [phone]

## 2020-07-02 NOTE — ED PROVIDER NOTES
[address] ENCOUNTER          ATTENDING PHYSICIAN NOTE       Date of evaluation: 7/1/2020    Chief Complaint     Leg Swelling and Other      History of Present Illness     Linda Palma is a 80 y.o. male with history of CAD, atrial fibrillation, CHF presenting for worsening edema. Patient was seen by his PCP earlier today and was noted to have gained over 10 pounds in the recent 1-2 weeks. He has diffuse edema of the upper and lower extremities as well as his scrotum. He is continues to urinate a normal amount on his home 40 mg Lasix. Denies fevers, cough, chest pain. Has had some dyspnea, particularly with ambulation through his home. No recent sick contacts. Review of Systems     Pertinent positive and negative findings as documented in the HPI. Otherwise all other systems were reviewed and were negative. Physical Exam     INITIAL VITALS: BP: (!) 166/81, Temp: 98.2 °F (36.8 °C), Pulse: 69, Resp: 18, SpO2: 99 %     Nursing note and vitals reviewed. General:  Adult male, alert and appropriately interactive. In no distress. HENT: Normocephalic and atraumatic. External ears normal. Nose appears normal externally. Eyes: Conjunctivae normal. No scleral icterus. Neck: Neck supple. No tracheal deviation present. CV: Normal rate. Regular rhythm. S1/S2 auscultated. No murmurs, gallops or rubs. Chest: Effort normal.  Intermittent faint crackles at the bilateral lung bases. Abdominal: Soft. No distension. No tenderness. No rebound or guarding. No masses. No peritoneal signs. Musculoskeletal: 3+ pitting edema throughout the bilateral lower extremities, 1+ upper extremity pitting edema, left greater than right. No gross deformities. Neurological: Alert and appropriately interactive. Face symmetric, speech without dysarthria or obvious aphasia. Moving all extremities spontaneously. Skin: Warm, dry. No rash. No diaphoresis or erythema.   Psychiatric: Calm and cooperative with appropriate mood and affect. Procedures   Procedures    MEDICAL DECISION MAKING     MDM: Tim Bunch is a 80 y.o. male with history of CHF presenting for edema and volume overload. On arrival, patient is in no distress and vital signs are reassuring. He is grossly volume overloaded with edema in all 4 extremities and some crackles at the lung bases, though he is not hypoxic or in respiratory distress. BNP is elevated as it has been previously and his remaining laboratory studies are notable for minimally elevated troponin to 0.04 and CKD, which may be the cause of his elevated troponin but will require trending. Discussed with his PCP prior to the patient's arrival who is concerned that he needs more aggressive diuresis and in setting of CKD may be difficult to perform in the outpatient setting, especially given his age. Will admit for further care and management. Clinical Impression     1. Acute on chronic combined systolic and diastolic CHF (congestive heart failure) (HCC)    2. Other hypervolemia    3. Elevated troponin        Disposition     DISPOSITION Admitted 07/01/2020 04:07:06 PM        Mary Kay Bullard MD  9:08 PM                     Past Medical, Surgical, Family, and Social History     He has a past medical history of Hypertension and SVT (supraventricular tachycardia) ([facility], [place] [address] He has a past surgical history that includes Prostate surgery; sinus surgery; and Carpal tunnel release. His family history includes Heart Disease in his sister; Kidney Disease in his sister. He reports that he quit smoking about 25 years ago. His smoking use included pipe and cigars. He has never used smokeless tobacco. He reports current alcohol use. He reports that he does not use drugs.     Medications     Current Discharge Medication List      CONTINUE these medications which have NOT CHANGED    Details   apixaban (ELIQUIS) 2.5 MG TABS tablet Take 1 tablet by mouth 2 times daily  Qty: 60 tablet, Refills: 5      lisinopril (PRINIVIL;ZESTRIL) 10 MG tablet Take 1 tablet by mouth daily  Qty: 30 tablet, Refills: 0    Associated Diagnoses: Essential hypertension; Atrial fibrillation, unspecified type (HCC)      metoprolol succinate (TOPROL XL) 50 MG extended release tablet TAKE 1 TABLET EVERY DAY  Qty: 30 tablet, Refills: 0    Associated Diagnoses: Essential hypertension; Atrial fibrillation, unspecified type (HCC)      finasteride (PROSCAR) 5 MG tablet Take 5 mg by mouth daily       simvastatin (ZOCOR) 40 MG tablet TAKE 1 TABLET IN THE EVENING  Refills: 2      furosemide (LASIX) 40 MG tablet TAKE 1 TABLET BY MOUTH ON MONDAY, WEDNESDAY AND FRIDAY  Refills: 3      timolol (TIMOPTIC-XE) 0.5 % ophthalmic gel-forming Place 1 drop into both eyes nightly              Allergies     He has No Known Allergies. ED Course     Nursing Notes, Past Medical Hx, Past Surgical Hx, Social Hx,Allergies, and Family Hx were reviewed.     Patient was given the following medications:  Orders Placed This Encounter   Medications    furosemide (LASIX) injection 40 mg    sodium chloride flush 0.9 % injection 10 mL    sodium chloride flush 0.9 % injection 10 mL    OR Linked Order Group     acetaminophen (TYLENOL) tablet 650 mg     acetaminophen (TYLENOL) suppository 650 mg    polyethylene glycol (GLYCOLAX) packet 17 g    OR Linked Order Group     promethazine (PHENERGAN) tablet 12.5 mg     ondansetron (ZOFRAN) injection 4 mg    perflutren lipid microspheres (DEFINITY) injection 1.65 mg    famotidine (PEPCID) tablet 20 mg    DISCONTD: furosemide (LASIX) injection 40 mg    finasteride (PROSCAR) tablet 5 mg    atorvastatin (LIPITOR) tablet 20 mg    timolol (TIMOPTIC) 0.5 % ophthalmic solution 1 drop    DISCONTD: lisinopril (PRINIVIL;ZESTRIL) tablet 10 mg    DISCONTD: metoprolol succinate (TOPROL XL) extended release tablet 50 mg    apixaban (ELIQUIS) tablet 2.5 mg    DISCONTD: lisinopril (PRINIVIL;ZESTRIL) tablet 10 mg    metoprolol succinate (TOPROL XL) extended release tablet 25 mg    furosemide (LASIX) injection 40 mg       Diagnostic Results     EKG   Sinus rhythm, ventricular rate 78. Right bundle branch block, left axis deviation. No ST or T wave changes acute ischemia, does have chronic T wave and ST changes not significantly changed from previous EKG.       RECENT VITALS:  BP: (!) 150/94,Temp: 97.9 °F (36.6 °C), Pulse: 73, Resp: 20, SpO2: 97 %     RADIOLOGY:  XR CHEST PORTABLE   Final Result      No acute disease         VL Extremity Venous Left    (Results Pending)       LABS:   Results for orders placed or performed during the hospital encounter of 07/01/20   CBC Auto Differential   Result Value Ref Range    WBC 3.8 (L) 4.0 - 11.0 K/uL    RBC 3.31 (L) 4.20 - 5.90 M/uL    Hemoglobin 10.2 (L) 13.5 - 17.5 g/dL    Hematocrit 31.0 (L) 40.5 - 52.5 %    MCV 93.5 80.0 - 100.0 fL    MCH 30.9 26.0 - 34.0 pg    MCHC 33.1 31.0 - 36.0 g/dL    RDW 16.4 (H) 12.4 - 15.4 %    Platelets 905 299 - 478 K/uL    MPV 8.5 5.0 - 10.5 fL    Neutrophils % 69.0 %    Lymphocytes % 10.1 %    Monocytes % 17.5 %    Eosinophils % 2.9 %    Basophils % 0.5 %    Neutrophils Absolute 2.6 1.7 - 7.7 K/uL    Lymphocytes Absolute 0.4 (L) 1.0 - 5.1 K/uL    Monocytes Absolute 0.7 0.0 - 1.3 K/uL    Eosinophils Absolute 0.1 0.0 - 0.6 K/uL    Basophils Absolute 0.0 0.0 - 0.2 K/uL   Basic Metabolic Panel w/ Reflex to MG   Result Value Ref Range    Sodium 135 (L) 136 - 145 mmol/L    Potassium reflex Magnesium 4.6 3.5 - 5.1 mmol/L    Chloride 103 99 - 110 mmol/L    CO2 19 (L) 21 - 32 mmol/L    Anion Gap 13 3 - 16    Glucose 81 70 - 99 mg/dL    BUN 44 (H) 7 - 20 mg/dL    CREATININE 2.2 (H) 0.8 - 1.3 mg/dL    GFR Non-African American 28 (A) >60    GFR  34 (A) >60    Calcium 10.0 8.3 - 10.6 mg/dL   Brain Natriuretic Peptide   Result Value Ref Range    Pro-BNP 5,677 (H) 0 - 449 pg/mL   Troponin   Result Value Ref Range    Troponin 0.04 (H) <0.01

## 2020-07-02 NOTE — PLAN OF CARE
Problem: Falls - Risk of:  Goal: Will remain free from falls  Description: Will remain free from falls  Outcome: Ongoing  Note: Patient at risk for falls. Patient resting quietly in bed. Side rails up x 3. Bed locked in lowest position. Bed alarm on. Bedside table and call light within reach. Patient instructed to call for assistance. Patient verbalized understanding. Will continue to monitor.

## 2020-07-02 NOTE — PLAN OF CARE
Discussed with patient's daughter Mrs. Marino Orantes who requested to be updated on patient care daily  # [phone] (home)  #[phone] (cell)

## 2020-07-02 NOTE — CONSULTS
[facility]    [facility]              [phone]                       Plan :     - Hold Lisinopril   - Continue Lasix 40mg IV BID b/c of fluid overload  - Strict I's & O's   - Monitor urine output  - Monitor Cr  - Free light chains  - Check Vit. D, PTH and Iron studies  - Elevate head of the bed   - Continue metoprolol 25mg  - Get new ECHO   - Monitor for signs of clinical improvement: decrease in edema, no dyspnea  - Follow cardiology recs       Assessment :     Stage IV CKD with acute kidney injury  - 2/2 to cardiorenal syndrome with fluid compression on renal vessels leading to the eventual ROSALINO  - Increase in Cr levels from 1.8 on 2/18/19 to 2.3 today  Plan   - Hold Lisinopril   - Continue Lasix 40mg IV BID b/c of fluid overload  - Strict I's & O's   - Monitor urine output  - Monitor Cr  - Free light chains  - Check Vit.  D  - Check PTH    CHF (last echo 6/14/19 EF of 50-55%, no RWMA, RVH with mildly depressed systolic function)  - Year long gradual increase in lower extremity edema with worsening in the last 2-3 months  - 2 pillow orthopnea  - SOB on exertion  - +JVD  - pro-BNP 5667   Plan  - Elevate head of the bed   - Continue diuresis with Lasix 40mg IV BID  - Continue metoprolol 25mg   - Strict I's & O's  - Get new Echo  - Monitor for signs of clinical improvement: decrease in edema, no dyspnea  - Follow cardiology recs    HTN  - Came in with BP of 166/81; currently well controlled BP of 122/72  Plan  - Hold Lisinopril  - Continue metoprolol 25mg    Anemia  - Drop in Hgb from 11.2 in 2018 to 10.2 on 7/1/19  Plan  - Check Iron studies        [facility] would like to thank Roseanne Barragan MD   for opportunity to serve this patient      Please call with questions at-   24 Hrs Answering service [phone] or  7 am- 5 pm via Perfect serve or cell phone  Huber Quinonez          CC/reason for consult :     CKD IIIb       HPI :     Fransisca Dukes is a 80 y.o. male with a PMH of CAD, AF (on Eliquis 2.5mg), CHF, CKD IIIb, HTN presented to the hospital on 7/1/2020 with a year long worsening edema that worsened in the last 2-3 months and now is present in his upper extremity, lower extremity and groin as well. He also endorsed a recent 10lb gain. Pt endorses SOB on exertion that has been chronic for years, right upper chest pain rarely and uses 2 pillows at night. Pt. Endorses chronic cough in the morning with productive brownish sputum in the morning. In the ED, he was given 40mg IV lasix once, labs CXR was done and showed no acute pathology, there was some mild cephalization of the pulmonary vessels, and placed on his home dose of 40mg Lasix BID. Interval History:     The patient was seen this morning while eating breakfast in bed. He was in good spirits. He had new complaints at this time. ROS:     Seen with - resident    positives in bold   Constitutional:  fever, chills, weakness, weight change, fatigue  Skin:  rash, pruritus, hair loss, bruising, dry skin, petechiae  Head, Face, Neck   headaches, swelling,  cervical adenopathy  Respiratory: shortness of breath, cough, or wheezing  Cardiovascular: chest pain, palpitations, dizzy, edema  Gastrointestinal: nausea, vomiting, diarrhea, constipation,belly pain    Yellow skin, blood in stool  Musculoskeletal:  back pain, muscle weakness, gait problems,       joint pain, right arm swelling compared to left,  2+ pitting edema in the lower extremity. Genitourinary:  dysuria, poor urine flow, flank pain, blood in urine  Neurologic:  vertigo, TIA'S, syncope, seizures, focal weakness  Psychosocial:  insomnia, anxiety, or depression.                         All other remaining systems are negative or unable to obtain        PMH/PSH/SH/Family History:     Past Medical History:   Diagnosis Date    Hypertension     SVT (supraventricular tachycardia) (HCC)        Past Surgical History:   Procedure Laterality Date    CARPAL TUNNEL RELEASE

## 2020-07-02 NOTE — PROGRESS NOTES
cooperative. HEENT: Pupils equal, round, and reactive to light. Conjunctivae/corneas clear. Respiratory:  Normal respiratory effort. Clear to auscultation, bilaterally without Rales/Wheezes/Rhonchi. Cardiovascular: Pacemaker in place  Abdomen: Soft, non-tender, non-distended with normal bowel sounds. Musculoskeletal: 2+ edema in lower extremities bilaterally; 2+ edema in left upper extremity; 1+ edema in right upper extremity; 1+ groin edema. Skin: Skin color, texture, turgor normal.  No rashes or lesions. Neurologic:  Neurovascularly intact without any focal sensory/motor deficits. Cranial nerves: II-XII intact, grossly non-focal.  Psychiatric: Alert and oriented, thought content appropriate, normal insight        Labs:   Recent Labs     07/01/20  1450 07/01/20  1829   WBC 3.8*  --    HGB 10.2*  --    HCT 31.0* 31.4*     --      Recent Labs     07/01/20  1450 07/02/20  0606   * 137   K 4.6 4.4    105   CO2 19* 23   BUN 44* 47*   CREATININE 2.2* 2.3*   CALCIUM 10.0 9.7     Recent Labs     07/01/20  1450   AST 23   ALT 12   BILIDIR 0.3   BILITOT 0.8   ALKPHOS 100     No results for input(s): INR in the last 72 hours. Recent Labs     07/01/20  1450 07/01/20  1829 07/01/20  2100   TROPONINI 0.04* 0.03* 0.03*       Urinalysis:      Lab Results   Component Value Date    NITRU Negative 06/19/2011    WBCUA 0-3 04/10/2012    BACTERIA Rare 04/10/2012    RBCUA TNTC 04/10/2012    BLOODU Negative 06/19/2011    SPECGRAV <=1.005 06/19/2011    GLUCOSEU Negative 06/19/2011       Radiology:  XR CHEST PORTABLE   Final Result      No acute disease         VL Extremity Venous Left    (Results Pending)           Assessment/Plan:    Melvi Hale, 80 y.o. male p/w edema to all extremities and recent 10 lbs weight gain.     Active Hospital Problems    Diagnosis Date Noted    Acute on chronic systolic (congestive) heart failure (HCC) [I50.23] 07/01/2020         Acute on chronic CHF exacerbation  - presentation indicative of diastolic heart failure, likely due to afib  - on echo from 6/14/2019 left ventricular systolic function appeared   normal with an ejection fraction of 50-55%. No regional wall motion   abnormalities were noted. Indeterminate diastolic function due to afib.   The right ventricle was mildly enlarged with mildly depressed systolic   function.  Moderate tricuspid regurgitation was noted  - f/u echo  - pacemaker interrogation did not reveal any causative abnormalities  - pro-BNP 5,677  - dry weight 175 lbs  - continue metoprolol 25mg (dose was decreased from 50 in the past due to hx of bradycardia)  - telemetry  - daily weights  - daily I/O  - f/u TSH  - 40mg IV lasix BID (home dose 40mg PO every other day)  - f/u troponin  - cardiology consult placed     CKD stage 4  - f/u Cr to make sure not affected by diuresis  - nephrology consulted  - f/u Mg  - hold lisinopril     Left upper extremity swelling  - f/u duplex u/s     Chronic problems  Anemia  - f/u Fe, B12, folate  Afib  Sick Sinus Syndrome s/p pacemaker placement  - Eliquis  CKD  HTN        DVT Prophylaxis: Eliquis  Diet: DIET LOW SODIUM 2 GM;  Code Status: Full Code     PT/OT Eval Status: will consult PT/OT to evaluate before discharge     Dispo - pending cardiology workup and consultation      I will discuss the patient with the senior resident and MD Glenn Gomez MD   Internal Medicine Resident PGY-1

## 2020-07-03 LAB
ANION GAP SERPL CALCULATED.3IONS-SCNC: 13 MMOL/L (ref 3–16)
BUN BLDV-MCNC: 55 MG/DL (ref 7–20)
CALCIUM SERPL-MCNC: 9.9 MG/DL (ref 8.3–10.6)
CHLORIDE BLD-SCNC: 100 MMOL/L (ref 99–110)
CO2: 23 MMOL/L (ref 21–32)
CREAT SERPL-MCNC: 2.4 MG/DL (ref 0.8–1.3)
GFR AFRICAN AMERICAN: 30
GFR NON-AFRICAN AMERICAN: 25
GLUCOSE BLD-MCNC: 92 MG/DL (ref 70–99)
KAPPA, FREE LIGHT CHAINS, SERUM: 93.56 MG/L (ref 3.3–19.4)
KAPPA/LAMBDA RATIO: 1.76 (ref 0.26–1.65)
KAPPA/LAMBDA TEST COMMENT: ABNORMAL
LAMBDA, FREE LIGHT CHAINS, SERUM: 53.17 MG/L (ref 5.71–26.3)
MAGNESIUM: 2 MG/DL (ref 1.8–2.4)
POTASSIUM SERPL-SCNC: 4 MMOL/L (ref 3.5–5.1)
SODIUM BLD-SCNC: 136 MMOL/L (ref 136–145)

## 2020-07-03 PROCEDURE — 83735 ASSAY OF MAGNESIUM: CPT

## 2020-07-03 PROCEDURE — 36415 COLL VENOUS BLD VENIPUNCTURE: CPT

## 2020-07-03 PROCEDURE — 99233 SBSQ HOSP IP/OBS HIGH 50: CPT | Performed by: INTERNAL MEDICINE

## 2020-07-03 PROCEDURE — 6370000000 HC RX 637 (ALT 250 FOR IP): Performed by: INTERNAL MEDICINE

## 2020-07-03 PROCEDURE — 2580000003 HC RX 258: Performed by: STUDENT IN AN ORGANIZED HEALTH CARE EDUCATION/TRAINING PROGRAM

## 2020-07-03 PROCEDURE — 80048 BASIC METABOLIC PNL TOTAL CA: CPT

## 2020-07-03 PROCEDURE — 1200000000 HC SEMI PRIVATE

## 2020-07-03 PROCEDURE — 6370000000 HC RX 637 (ALT 250 FOR IP): Performed by: STUDENT IN AN ORGANIZED HEALTH CARE EDUCATION/TRAINING PROGRAM

## 2020-07-03 RX ORDER — OMEGA-3S/DHA/EPA/FISH OIL/D3 300MG-1000
400 CAPSULE ORAL DAILY
Status: DISCONTINUED | OUTPATIENT
Start: 2020-07-03 | End: 2020-07-05 | Stop reason: HOSPADM

## 2020-07-03 RX ORDER — FUROSEMIDE 10 MG/ML
40 INJECTION INTRAMUSCULAR; INTRAVENOUS DAILY
Status: DISCONTINUED | OUTPATIENT
Start: 2020-07-03 | End: 2020-07-03

## 2020-07-03 RX ORDER — FUROSEMIDE 10 MG/ML
20 INJECTION INTRAMUSCULAR; INTRAVENOUS DAILY
Status: DISCONTINUED | OUTPATIENT
Start: 2020-07-03 | End: 2020-07-03

## 2020-07-03 RX ORDER — FUROSEMIDE 40 MG/1
40 TABLET ORAL 2 TIMES DAILY
Status: DISCONTINUED | OUTPATIENT
Start: 2020-07-03 | End: 2020-07-05

## 2020-07-03 RX ADMIN — FUROSEMIDE 40 MG: 40 TABLET ORAL at 17:40

## 2020-07-03 RX ADMIN — METOPROLOL SUCCINATE 25 MG: 25 TABLET, EXTENDED RELEASE ORAL at 09:47

## 2020-07-03 RX ADMIN — TIMOLOL MALEATE 1 DROP: 5 SOLUTION OPHTHALMIC at 21:50

## 2020-07-03 RX ADMIN — CHOLECALCIFEROL (VITAMIN D3) 10 MCG (400 UNIT) TABLET 400 UNITS: at 12:12

## 2020-07-03 RX ADMIN — FINASTERIDE 5 MG: 5 TABLET, FILM COATED ORAL at 09:46

## 2020-07-03 RX ADMIN — ATORVASTATIN CALCIUM 20 MG: 20 TABLET, FILM COATED ORAL at 09:46

## 2020-07-03 RX ADMIN — FAMOTIDINE 20 MG: 20 TABLET ORAL at 09:46

## 2020-07-03 RX ADMIN — APIXABAN 2.5 MG: 2.5 TABLET, FILM COATED ORAL at 21:49

## 2020-07-03 RX ADMIN — POLYETHYLENE GLYCOL 3350 17 G: 17 POWDER, FOR SOLUTION ORAL at 12:03

## 2020-07-03 RX ADMIN — APIXABAN 2.5 MG: 2.5 TABLET, FILM COATED ORAL at 09:46

## 2020-07-03 RX ADMIN — Medication 10 ML: at 21:52

## 2020-07-03 RX ADMIN — FUROSEMIDE 40 MG: 40 TABLET ORAL at 09:46

## 2020-07-03 RX ADMIN — Medication 10 ML: at 09:47

## 2020-07-03 ASSESSMENT — PAIN SCALES - GENERAL: PAINLEVEL_OUTOF10: 0

## 2020-07-03 NOTE — PROGRESS NOTES
[facility]    [facility]              [phone]                       Plan :     - Hold Lisinopril   - cont with lasix   - UOP fair  - Will have to accept higher creatinine to keep fluid status stable   - BP getting lowish  - FLC pending  Change lasix to daily and follow with UOP  Acceptable creatinine trend for now       Assessment :     Stage IV CKD with acute kidney injury  - 2/2 to cardiorenal syndrome with fluid compression on renal vessels leading to the eventual ROSALINO  - Increase in Cr levels from 1.8 on 2/18/19 to 2.3 today  Plan   - Hold Lisinopril   - Strict I's & O's   - Monitor urine output  - Monitor Cr  - Free light chains  - Check Vit.  D  - Check PTH    CHF (last echo 6/14/19 EF of 50-55%, no RWMA, RVH with mildly depressed systolic function)  - Year long gradual increase in lower extremity edema with worsening in the last 2-3 months  - 2 pillow orthopnea  - SOB on exertion  - +JVD  - pro-BNP 5684   Plan  - Elevate head of the bed   - Continue diuresis with Lasix 40mg IV BID  - Continue metoprolol 25mg   - Strict I's & O's  - Get new Echo  - Monitor for signs of clinical improvement: decrease in edema, no dyspnea  - Follow cardiology recs    HTN  - Came in with BP of 166/81; currently well controlled BP of 122/72  Plan  - Hold Lisinopril  - Continue metoprolol 25mg    Anemia  - Drop in Hgb from 11.2 in 2018 to 10.2 on 7/1/19  Plan  - Check Iron studies        [facility] would like to thank Felizardo Canavan, MD   for opportunity to serve this patient      Please call with questions at-   24 Hrs Answering service [phone] or  7 am- 5 pm via Perfect serve or cell phone  Domenic Briscoe          CC/reason for consult :     CKD IIIb       HPI :     Mallorie Lomas is a 80 y.o. male with a PMH of CAD, AF (on Eliquis 2.5mg), CHF, CKD IIIb, HTN presented to the hospital on 7/1/2020 with a year long worsening edema that worsened in the last 2-3 months and now is present in his upper extremity, lower extremity and groin as well. He also endorsed a recent 10lb gain. Pt endorses SOB on exertion that has been chronic for years, right upper chest pain rarely and uses 2 pillows at night. Pt. Endorses chronic cough in the morning with productive brownish sputum in the morning. In the ED, he was given 40mg IV lasix once, labs CXR was done and showed no acute pathology, there was some mild cephalization of the pulmonary vessels, and placed on his home dose of 40mg Lasix BID. Interval History:     Feels ok   No new complaints     ROS:     Seen with - none     positives in bold   Constitutional:  fever, chills, weakness, weight change, fatigue  Skin:  rash, pruritus, hair loss, bruising, dry skin, petechiae  Head, Face, Neck   headaches, swelling,  cervical adenopathy  Respiratory: shortness of breath, cough, or wheezing  Cardiovascular: chest pain, palpitations, dizzy, edema  Gastrointestinal: nausea, vomiting, diarrhea, constipation,belly pain    Yellow skin, blood in stool  Musculoskeletal:  back pain, muscle weakness, gait problems,       joint pain, right arm swelling compared to left,  2+ pitting edema in the lower extremity. Genitourinary:  dysuria, poor urine flow, flank pain, blood in urine  Neurologic:  vertigo, TIA'S, syncope, seizures, focal weakness  Psychosocial:  insomnia, anxiety, or depression. All other remaining systems are negative or unable to obtain        PMH/PSH/SH/Family History:     Past Medical History:   Diagnosis Date    Hypertension     SVT (supraventricular tachycardia) (HCC)        Past Surgical History:   Procedure Laterality Date    CARPAL TUNNEL RELEASE      PROSTATE SURGERY      SINUS SURGERY          reports that he quit smoking about 25 years ago. His smoking use included pipe and cigars. He has never used smokeless tobacco. He reports current alcohol use. He reports that he does not use drugs.     family history includes Heart Disease in his sister; Kidney Disease in his sister. Medication:     Current Facility-Administered Medications: furosemide (LASIX) tablet 40 mg, 40 mg, Oral, BID  vitamin D3 (CHOLECALCIFEROL) tablet 400 Units, 400 Units, Oral, Daily  sodium chloride flush 0.9 % injection 10 mL, 10 mL, Intravenous, 2 times per day  sodium chloride flush 0.9 % injection 10 mL, 10 mL, Intravenous, PRN  acetaminophen (TYLENOL) tablet 650 mg, 650 mg, Oral, Q6H PRN **OR** acetaminophen (TYLENOL) suppository 650 mg, 650 mg, Rectal, Q6H PRN  polyethylene glycol (GLYCOLAX) packet 17 g, 17 g, Oral, Daily PRN  promethazine (PHENERGAN) tablet 12.5 mg, 12.5 mg, Oral, Q6H PRN **OR** ondansetron (ZOFRAN) injection 4 mg, 4 mg, Intravenous, Q6H PRN  perflutren lipid microspheres (DEFINITY) injection 1.65 mg, 1.5 mL, Intravenous, ONCE PRN  famotidine (PEPCID) tablet 20 mg, 20 mg, Oral, Daily  finasteride (PROSCAR) tablet 5 mg, 5 mg, Oral, Daily  atorvastatin (LIPITOR) tablet 20 mg, 20 mg, Oral, Daily  timolol (TIMOPTIC) 0.5 % ophthalmic solution 1 drop, 1 drop, Both Eyes, Nightly  apixaban (ELIQUIS) tablet 2.5 mg, 2.5 mg, Oral, BID  metoprolol succinate (TOPROL XL) extended release tablet 25 mg, 25 mg, Oral, Daily       Vitals :     Vitals:    07/03/20 1202   BP: 121/79   Pulse: 84   Resp: 19   Temp: 97.7 °F (36.5 °C)   SpO2: 99%       I & O :       Intake/Output Summary (Last 24 hours) at 7/3/2020 1559  Last data filed at 7/3/2020 1320  Gross per 24 hour   Intake 360 ml   Output 2500 ml   Net -2140 ml        Physical Examination :     General appearance: Anxious- no, distressed- no, in good spirits- yes  HEENT: Lips- normal, teeth- ok , oral mucosa- moist  Neck : Mass- no, appears symmetrical, JVD- not visible  Respiratory: Respiratory effort-  no, wheeze- no, crackles - no  Cardiovascular:  Ausculation- No M/R/G, Edema - 2+ pitting edema in the lower extremity, +JVD  Abdomen: visible mass- no, distention- no, scar- no,

## 2020-07-03 NOTE — PROGRESS NOTES
CC: SOB    HPI/Update: No angina. No sig sob. Urinating a lot. Feels better. Past Medical History:   Diagnosis Date    Hypertension     SVT (supraventricular tachycardia) (HCC)        PE: Blood pressure (!) 105/58, pulse 84, temperature 97.3 °F (36.3 °C), temperature source Oral, resp. rate 20, height 5' 11.5\" (1.816 m), weight 179 lb 11.2 oz (81.5 kg), SpO2 100 %. General (appearance): Well devel. No distress  Eyes: Anicteric  Neck: Supple. JVD diff to asses  Ears/Nose/Mouth/Thorat: No cyanosis  CV: Irreg irreg. + abisai   Respiratory:  Clear, normal respiratory effort  GI: abd soft  Skin: Warm, dry. No rashes  Neuro/Psych: Alert and cooperative. Appropriate behavior  Ext:  No c/c. Pulses:  2+ carotid    Lab Results   Component Value Date    WBC 3.8 (L) 07/01/2020    HGB 10.2 (L) 07/01/2020    HCT 31.4 (L) 07/01/2020    MCV 93.5 07/01/2020     07/01/2020     Lab Results   Component Value Date     07/03/2020    K 4.0 07/03/2020     07/03/2020    CO2 23 07/03/2020    BUN 55 (H) 07/03/2020    CREATININE 2.4 (H) 07/03/2020    GLUCOSE 92 07/03/2020    CALCIUM 9.9 07/03/2020    PROT 7.0 07/01/2020    LABALBU 3.6 07/01/2020    BILITOT 0.8 07/01/2020    ALKPHOS 100 07/01/2020    AST 23 07/01/2020    ALT 12 07/01/2020    LABGLOM 25 (A) 07/03/2020    GFRAA 30 (A) 07/03/2020     Lab Results   Component Value Date    CKTOTAL 71 06/19/2011    CKMB 1.4 06/19/2011    TROPONINI 0.03 (H) 07/01/2020     Lab Results   Component Value Date    INR 1.1 06/18/2011    PROTIME 13.5 06/18/2011 6/2019 TTE: EF 50-55%. Indeterminate diastolic function.  Mild RVE and dysfunction, moderate TR    1/2019 Scottie: Normal    7/1/2020 ECG: A fib, RBBB      Current Facility-Administered Medications:     furosemide (LASIX) injection 20 mg, 20 mg, Intravenous, Daily, Sd Quiroz MD    sodium chloride flush 0.9 % injection 10 mL, 10 mL, Intravenous, 2 times per day, Glenn Reyez MD, 10 mL at 07/02/20 2133    sodium which time was spent on counseling and coordinating care for the above plan. Weston YEE.  David Hidalgo MD, [facility]

## 2020-07-03 NOTE — PLAN OF CARE
Problem: Falls - Risk of:  Goal: Will remain free from falls  Description: Will remain free from falls  7/3/2020 0901 by Heidi Douglas RN  Outcome: Ongoing  Note: Patient is a high fall risk, fall precautions in place, patient remains free from falls. Problem: Fluid Volume:  Goal: Hemodynamic stability will improve  Description: Hemodynamic stability will improve  Note: Patient VSS, patient continues on lasix, changed to oral.  Accurate I/O documented, patient educated on low sodium diet, importance of daily weights and limiting fluid intake, patient verbalized understanding.

## 2020-07-03 NOTE — PROGRESS NOTES
Internal Medicine PGY-1 Resident Progress Note        PCP: Jarred Marte MD    Date of Admission: 7/1/2020    Chief Complaint: Edema in all extremities and recent 10 lbs weight gain    Hospital Course: 80 y.o. 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 Leydi Mueller office for edema in all extremities and recent 10 lbs weight gain. Failed outpatient lasix. Cardiology and nephrology teams were consulted. Placed on Lasix 40mg IV BID and has had good response, then transitioned to Lasix 40mg PO BID. He follows with Dr. Kenneth Jerome and Dr. Rehan vázquez cardiology. Subjective: Pt resting comfortably in bed. Good urine output, swelling has gone down, on cardiac diet. Denied any chest pain, shortness of air, nausea or vomiting. Last BM 4 days ago.       Medications:  Reviewed    Infusion Medications   Scheduled Medications    furosemide  40 mg Oral BID    sodium chloride flush  10 mL Intravenous 2 times per day    famotidine  20 mg Oral Daily    finasteride  5 mg Oral Daily    atorvastatin  20 mg Oral Daily    timolol  1 drop Both Eyes Nightly    apixaban  2.5 mg Oral BID    metoprolol succinate  25 mg Oral Daily     PRN Meds: sodium chloride flush, acetaminophen **OR** acetaminophen, polyethylene glycol, promethazine **OR** ondansetron, perflutren lipid microspheres      Intake/Output Summary (Last 24 hours) at 7/3/2020 1103  Last data filed at 7/3/2020 1050  Gross per 24 hour   Intake 360 ml   Output 2750 ml   Net -2390 ml       Physical Exam Performed:    BP (!) 111/56   Pulse 85   Temp 98.2 °F (36.8 °C) (Oral)   Resp 17   Ht 5' 11.5\" (1.816 m)   Wt 179 lb 11.2 oz (81.5 kg)   SpO2 98%   BMI 24.71 kg/m²     General appearance: No apparent distress, appears stated age and cooperative. HEENT: Pupils equal, round, and reactive to light. Conjunctivae/corneas clear. Respiratory:  Normal respiratory effort. Clear to auscultation, bilaterally without Rales/Wheezes/Rhonchi. Cardiovascular: Pacemaker in place  Abdomen: Soft, non-tender, non-distended with normal bowel sounds. Musculoskeletal: 1+ edema in lower extremities bilaterally; 1+ edema in left upper extremity; no edema in right upper extremity; no groin edema. Skin: Skin color, texture, turgor normal.  No rashes or lesions. Neurologic:  Neurovascularly intact without any focal sensory/motor deficits. Cranial nerves: II-XII intact, grossly non-focal.  Psychiatric: Alert and oriented, thought content appropriate, normal insight        Labs:   Recent Labs     07/01/20  1450 07/01/20  1829   WBC 3.8*  --    HGB 10.2*  --    HCT 31.0* 31.4*     --      Recent Labs     07/01/20  1450 07/02/20  0606 07/03/20  0624   * 137 136   K 4.6 4.4 4.0    105 100   CO2 19* 23 23   BUN 44* 47* 55*   CREATININE 2.2* 2.3* 2.4*   CALCIUM 10.0 9.7 9.9     Recent Labs     07/01/20  1450   AST 23   ALT 12   BILIDIR 0.3   BILITOT 0.8   ALKPHOS 100     No results for input(s): INR in the last 72 hours. Recent Labs     07/01/20  1450 07/01/20  1829 07/01/20  2100   TROPONINI 0.04* 0.03* 0.03*       Urinalysis:      Lab Results   Component Value Date    NITRU Negative 07/02/2020    WBCUA 0-3 04/10/2012    BACTERIA Rare 04/10/2012    RBCUA TNTC 04/10/2012    BLOODU Negative 07/02/2020    SPECGRAV 1.010 07/02/2020    GLUCOSEU Negative 07/02/2020    GLUCOSEU Negative 06/19/2011       Radiology:  VL Extremity Venous Left   Final Result      XR CHEST PORTABLE   Final Result      No acute disease                 Assessment/Plan:    Demi Keller, 80 y.o. male p/w edema to all extremities and recent 10 lbs weight gain.     Active Hospital Problems    Diagnosis Date Noted    Acute on chronic systolic Prophylaxis: Eliquis  Diet: DIET LOW SODIUM 2 GM;  Code Status: Full Code     PT/OT Eval Status: will consult PT/OT to evaluate before discharge     Dispo - pending cardiology workup and consultation      I will discuss the patient with the senior resident and MD Xin Mahmood MD   Internal Medicine Resident PGY-1

## 2020-07-04 LAB
ANION GAP SERPL CALCULATED.3IONS-SCNC: 10 MMOL/L (ref 3–16)
BUN BLDV-MCNC: 52 MG/DL (ref 7–20)
CALCIUM SERPL-MCNC: 9.6 MG/DL (ref 8.3–10.6)
CHLORIDE BLD-SCNC: 101 MMOL/L (ref 99–110)
CO2: 24 MMOL/L (ref 21–32)
CREAT SERPL-MCNC: 2.4 MG/DL (ref 0.8–1.3)
GFR AFRICAN AMERICAN: 30
GFR NON-AFRICAN AMERICAN: 25
GLUCOSE BLD-MCNC: 93 MG/DL (ref 70–99)
MAGNESIUM: 2 MG/DL (ref 1.8–2.4)
POTASSIUM SERPL-SCNC: 3.9 MMOL/L (ref 3.5–5.1)
PRO-BNP: 6386 PG/ML (ref 0–449)
SODIUM BLD-SCNC: 135 MMOL/L (ref 136–145)

## 2020-07-04 PROCEDURE — 83880 ASSAY OF NATRIURETIC PEPTIDE: CPT

## 2020-07-04 PROCEDURE — 6370000000 HC RX 637 (ALT 250 FOR IP): Performed by: STUDENT IN AN ORGANIZED HEALTH CARE EDUCATION/TRAINING PROGRAM

## 2020-07-04 PROCEDURE — 99233 SBSQ HOSP IP/OBS HIGH 50: CPT | Performed by: INTERNAL MEDICINE

## 2020-07-04 PROCEDURE — 1200000000 HC SEMI PRIVATE

## 2020-07-04 PROCEDURE — 2580000003 HC RX 258: Performed by: STUDENT IN AN ORGANIZED HEALTH CARE EDUCATION/TRAINING PROGRAM

## 2020-07-04 PROCEDURE — 83735 ASSAY OF MAGNESIUM: CPT

## 2020-07-04 PROCEDURE — 6370000000 HC RX 637 (ALT 250 FOR IP): Performed by: INTERNAL MEDICINE

## 2020-07-04 PROCEDURE — 36415 COLL VENOUS BLD VENIPUNCTURE: CPT

## 2020-07-04 PROCEDURE — 93005 ELECTROCARDIOGRAM TRACING: CPT | Performed by: INTERNAL MEDICINE

## 2020-07-04 PROCEDURE — 80048 BASIC METABOLIC PNL TOTAL CA: CPT

## 2020-07-04 RX ORDER — POLYETHYLENE GLYCOL 3350 17 G/17G
17 POWDER, FOR SOLUTION ORAL DAILY
Status: DISCONTINUED | OUTPATIENT
Start: 2020-07-04 | End: 2020-07-05 | Stop reason: HOSPADM

## 2020-07-04 RX ORDER — SENNA AND DOCUSATE SODIUM 50; 8.6 MG/1; MG/1
2 TABLET, FILM COATED ORAL DAILY
Status: DISCONTINUED | OUTPATIENT
Start: 2020-07-04 | End: 2020-07-05 | Stop reason: HOSPADM

## 2020-07-04 RX ORDER — SENNA AND DOCUSATE SODIUM 50; 8.6 MG/1; MG/1
2 TABLET, FILM COATED ORAL DAILY PRN
Status: DISCONTINUED | OUTPATIENT
Start: 2020-07-04 | End: 2020-07-04

## 2020-07-04 RX ADMIN — DOCUSATE SODIUM 50 MG AND SENNOSIDES 8.6 MG 2 TABLET: 8.6; 5 TABLET, FILM COATED ORAL at 16:58

## 2020-07-04 RX ADMIN — ATORVASTATIN CALCIUM 20 MG: 20 TABLET, FILM COATED ORAL at 08:20

## 2020-07-04 RX ADMIN — POLYETHYLENE GLYCOL 3350 17 G: 17 POWDER, FOR SOLUTION ORAL at 16:59

## 2020-07-04 RX ADMIN — Medication 10 ML: at 20:06

## 2020-07-04 RX ADMIN — FUROSEMIDE 40 MG: 40 TABLET ORAL at 08:20

## 2020-07-04 RX ADMIN — FUROSEMIDE 40 MG: 40 TABLET ORAL at 16:59

## 2020-07-04 RX ADMIN — FINASTERIDE 5 MG: 5 TABLET, FILM COATED ORAL at 08:20

## 2020-07-04 RX ADMIN — Medication 10 ML: at 08:20

## 2020-07-04 RX ADMIN — APIXABAN 2.5 MG: 2.5 TABLET, FILM COATED ORAL at 20:05

## 2020-07-04 RX ADMIN — APIXABAN 2.5 MG: 2.5 TABLET, FILM COATED ORAL at 08:20

## 2020-07-04 RX ADMIN — TIMOLOL MALEATE 1 DROP: 5 SOLUTION OPHTHALMIC at 20:05

## 2020-07-04 RX ADMIN — METOPROLOL SUCCINATE 25 MG: 25 TABLET, EXTENDED RELEASE ORAL at 08:20

## 2020-07-04 RX ADMIN — FAMOTIDINE 20 MG: 20 TABLET ORAL at 08:20

## 2020-07-04 RX ADMIN — CHOLECALCIFEROL (VITAMIN D3) 10 MCG (400 UNIT) TABLET 400 UNITS: at 08:20

## 2020-07-04 ASSESSMENT — PAIN SCALES - GENERAL
PAINLEVEL_OUTOF10: 0

## 2020-07-04 NOTE — PROGRESS NOTES
[facility]    [facility]. [facility]              [phone]                       Plan :     - Hold Lisinopril   - cont with daily lasix  Creatinine and volume status stable   Eating well   Bicarb stable      Assessment :     Stage IV CKD with acute kidney injury  - 2/2 to cardiorenal syndrome with fluid compression on renal vessels leading to the eventual ROSALINO  - Increase in Cr levels from 1.8 on 2/18/19 to 2.3 today  Plan   - Hold Lisinopril   - Strict I's & O's   - Monitor urine output  - Monitor Cr  - Free light chains  - Check Vit. D  - Check PTH    CHF (last echo 6/14/19 EF of 50-55%, no RWMA, RVH with mildly depressed systolic function)  - Year long gradual increase in lower extremity edema with worsening in the last 2-3 months  - 2 pillow orthopnea  - SOB on exertion  - +JVD  - pro-BNP 5667   Plan  - Elevate head of the bed   - Continue diuresis with Lasix 40mg IV BID  - Continue metoprolol 25mg   - Strict I's & O's  - Get new Echo  - Monitor for signs of clinical improvement: decrease in edema, no dyspnea  - Follow cardiology recs    HTN  - Came in with BP of 166/81; currently well controlled BP of 122/72  Plan  - Hold Lisinopril  - Continue metoprolol 25mg    Anemia  - Drop in Hgb from 11.2 in 2018 to 10.2 on 7/1/19  Plan  - Check Iron studies        [facility] would like to thank Lucian Hicks MD   for opportunity to serve this patient      Please call with questions at-   24 Hrs Answering service [phone] or  7 am- 5 pm via Perfect serve or cell phone  Lola Umanzor          CC/reason for consult :     CKD IIIb       HPI :     Omi Ruvalcaba is a 80 y.o. male with a PMH of CAD, AF (on Eliquis 2.5mg), CHF, CKD IIIb, HTN presented to the hospital on 7/1/2020 with a year long worsening edema that worsened in the last 2-3 months and now is present in his upper extremity, lower extremity and groin as well. He also endorsed a recent 10lb gain.  Pt endorses SOB on exertion that has been chronic for years, right upper chest pain rarely and uses 2 pillows at night. Pt. Endorses chronic cough in the morning with productive brownish sputum in the morning. In the ED, he was given 40mg IV lasix once, labs CXR was done and showed no acute pathology, there was some mild cephalization of the pulmonary vessels, and placed on his home dose of 40mg Lasix BID. Interval History:     Feels overall better    ROS:     Seen with - none     positives in bold   Constitutional:  fever, chills, weakness, weight change, fatigue  Skin:  rash, pruritus, hair loss, bruising, dry skin, petechiae  Head, Face, Neck   headaches, swelling,  cervical adenopathy  Respiratory: shortness of breath, cough, or wheezing  Cardiovascular: chest pain, palpitations, dizzy, edema  Gastrointestinal: nausea, vomiting, diarrhea, constipation,belly pain    Yellow skin, blood in stool  Musculoskeletal:  back pain, muscle weakness, gait problems,       joint pain, right arm swelling compared to left,  2+ pitting edema in the lower extremity. Genitourinary:  dysuria, poor urine flow, flank pain, blood in urine  Neurologic:  vertigo, TIA'S, syncope, seizures, focal weakness  Psychosocial:  insomnia, anxiety, or depression. All other remaining systems are negative or unable to obtain        PMH/PSH/SH/Family History:     Past Medical History:   Diagnosis Date    Hypertension     SVT (supraventricular tachycardia) (HCC)        Past Surgical History:   Procedure Laterality Date    CARPAL TUNNEL RELEASE      PROSTATE SURGERY      SINUS SURGERY          reports that he quit smoking about 25 years ago. His smoking use included pipe and cigars. He has never used smokeless tobacco. He reports current alcohol use. He reports that he does not use drugs. family history includes Heart Disease in his sister; Kidney Disease in his sister.          Medication:     Current Facility-Administered Medications: glycerin (ADULT) suppository 2 g, 1 suppository, Rectal, Once  polyethylene glycol (GLYCOLAX) packet 17 g, 17 g, Oral, Daily  sennosides-docusate sodium (SENOKOT-S) 8.6-50 MG tablet 2 tablet, 2 tablet, Oral, Daily  furosemide (LASIX) tablet 40 mg, 40 mg, Oral, BID  vitamin D3 (CHOLECALCIFEROL) tablet 400 Units, 400 Units, Oral, Daily  sodium chloride flush 0.9 % injection 10 mL, 10 mL, Intravenous, 2 times per day  sodium chloride flush 0.9 % injection 10 mL, 10 mL, Intravenous, PRN  acetaminophen (TYLENOL) tablet 650 mg, 650 mg, Oral, Q6H PRN **OR** acetaminophen (TYLENOL) suppository 650 mg, 650 mg, Rectal, Q6H PRN  promethazine (PHENERGAN) tablet 12.5 mg, 12.5 mg, Oral, Q6H PRN **OR** ondansetron (ZOFRAN) injection 4 mg, 4 mg, Intravenous, Q6H PRN  perflutren lipid microspheres (DEFINITY) injection 1.65 mg, 1.5 mL, Intravenous, ONCE PRN  famotidine (PEPCID) tablet 20 mg, 20 mg, Oral, Daily  finasteride (PROSCAR) tablet 5 mg, 5 mg, Oral, Daily  atorvastatin (LIPITOR) tablet 20 mg, 20 mg, Oral, Daily  timolol (TIMOPTIC) 0.5 % ophthalmic solution 1 drop, 1 drop, Both Eyes, Nightly  apixaban (ELIQUIS) tablet 2.5 mg, 2.5 mg, Oral, BID  metoprolol succinate (TOPROL XL) extended release tablet 25 mg, 25 mg, Oral, Daily       Vitals :     Vitals:    07/04/20 1152   BP: 105/62   Pulse: 74   Resp: 20   Temp: 97.1 °F (36.2 °C)   SpO2: 100%       I & O :       Intake/Output Summary (Last 24 hours) at 7/4/2020 1517  Last data filed at 7/4/2020 1341  Gross per 24 hour   Intake 840 ml   Output 2300 ml   Net -1460 ml        Physical Examination :     General appearance: Anxious- no, distressed- no, in good spirits- yes  HEENT: Lips- normal, teeth- ok , oral mucosa- moist  Neck : Mass- no, appears symmetrical, JVD- not visible  Respiratory: Respiratory effort-  no, wheeze- no, crackles - no  Cardiovascular:  Ausculation- No M/R/G, Edema - 2+ pitting edema in the lower extremity, +JVD  Abdomen: visible mass- no, distention- no, scar- no, tenderness- no                            hepatosplenomegaly-  no  Musculoskeletal:  right arm swelling compared to left, 2+ pitting edema in the lower extremity.   clubbing no,cyanosis- no , digital ischemia- no                           muscle strength- grossly normal , tone - grossly normal  Skin: rashes- no , ulcers- no, induration- no, tightening - no  Psychiatric:  Judgement and insight- normal           AAO X 3       LABS:     Recent Labs     07/01/20  1829   HCT 31.4*     Recent Labs     07/02/20  0606 07/03/20  0624 07/04/20  0618    136 135*   K 4.4 4.0 3.9    100 101   CO2 23 23 24   BUN 47* 55* 52*   CREATININE 2.3* 2.4* 2.4*   GLUCOSE 97 92 93   MG 2.10 2.00 2.00          Thanks  Nephrology  [address], [address]  Office: [phone]  Fax: [phone]

## 2020-07-04 NOTE — PROGRESS NOTES
filed at 7/4/2020 1341  Gross per 24 hour   Intake 840 ml   Output 2300 ml   Net -1460 ml       Physical Exam Performed:    /62   Pulse 74   Temp 97.1 °F (36.2 °C) (Oral)   Resp 20   Ht 5' 11.5\" (1.816 m)   Wt 179 lb 11.2 oz (81.5 kg)   SpO2 100%   BMI 24.71 kg/m²     General appearance: No apparent distress, appears stated age and cooperative. HEENT: Pupils equal, round, and reactive to light. Conjunctivae/corneas clear. Respiratory:  Normal respiratory effort. Clear to auscultation, bilaterally without Rales/Wheezes/Rhonchi. Cardiovascular: Pacemaker in place  Abdomen: Soft, non-tender, non-distended with normal bowel sounds. Musculoskeletal: 1+ edema in lower extremities bilaterally; 1+ edema in left upper extremity; no edema in right upper extremity; no groin edema. Skin: Skin color, texture, turgor normal.  No rashes or lesions. Neurologic:  Neurovascularly intact without any focal sensory/motor deficits. Cranial nerves: II-XII intact, grossly non-focal.  Psychiatric: Alert and oriented, thought content appropriate, normal insight    Labs:   Recent Labs     07/01/20  1829   HCT 31.4*     Recent Labs     07/02/20  0606 07/03/20  0624 07/04/20  0618    136 135*   K 4.4 4.0 3.9    100 101   CO2 23 23 24   BUN 47* 55* 52*   CREATININE 2.3* 2.4* 2.4*   CALCIUM 9.7 9.9 9.6     No results for input(s): AST, ALT, BILIDIR, BILITOT, ALKPHOS in the last 72 hours. No results for input(s): INR in the last 72 hours.   Recent Labs     07/01/20  1829 07/01/20  2100   TROPONINI 0.03* 0.03*       Urinalysis:      Lab Results   Component Value Date    NITRU Negative 07/02/2020    WBCUA 0-3 04/10/2012    BACTERIA Rare 04/10/2012    RBCUA TNTC 04/10/2012    BLOODU Negative 07/02/2020    SPECGRAV 1.010 07/02/2020    GLUCOSEU Negative 07/02/2020    GLUCOSEU Negative 06/19/2011       Radiology:  VL Extremity Venous Left   Final Result      XR CHEST PORTABLE   Final Result      No acute disease Assessment/Plan:    Tim Bunch, 80 y.o. male p/w edema to all extremities and recent 10 lbs weight gain. Active Hospital Problems    Diagnosis Date Noted    Acute on chronic systolic (congestive) heart failure (HCC) [I50.23] 07/01/2020     Acute on chronic CHF exacerbation  - presentation indicative of diastolic heart failure, likely due to afib  - echo findings: ejection fraction of 55-60%; Indeterminate diastolic function.   - pacemaker interrogation did not reveal any causative abnormalities  - pro-BNP at 6K, which may be his new baselined  - nearing dry weight of 175 lbs  - continue metoprolol 25mg (dose was decreased from 50 in the past due to hx of bradycardia)  - telemetry  - daily weights  - daily I/O  - cardiology consult - appreciate recommendations  - Toprol 25 mg daily  - oral lasix 40 mg bid per cardiology consult (previous home dose 40mg PO every other day)   - may have to accept new CR baseline of 2.4, appreciate nephro recs  - plan for discharge tomorrow morning on lasix 40 mg PO BID  - chf f/u early next week      CKD stage 4  - may have to accept new CR baseline of 2.4, appreciate nephro recs  - U/A normal  - nephrology consulted - appreciate recommendations  - hold lisinopril on discharge  - Vit D supplemented     Left upper extremity swelling  - duplex u/s findings: no evidence of deep or superficial venous thrombosis involving the   left upper extremity    Constipation  - last BM 4 days ago  - stool-softener   - miralax   - glycerin suppository     Chronic problems  Anemia  Afib  Sick Sinus Syndrome s/p pacemaker placement  - on Eliquis  CKD  HTN        DVT Prophylaxis: Eliquis  Diet: DIET LOW SODIUM 2 GM;  Code Status: Full Code        Dispo - awaiting PT/OT prior to discharge, likely tomorrow      I will discuss the patient with the senior resident and MD Chandler Dunn MD   Internal Medicine Resident PGY-3

## 2020-07-04 NOTE — PLAN OF CARE
Problem: Falls - Risk of:  Goal: Will remain free from falls  Description: Will remain free from falls  Outcome: Ongoing  Note: Patient at risk for falls. Patient resting quietly in bed, A/O x 4, steady gait with cane. Side rails up x 3. Bed locked in lowest position. Bed alarm on. Bedside table and call light within reach. Patient instructed to call for assistance. Patient verbalized understanding. Will continue to monitor.        Problem: Fluid Volume:  Goal: Hemodynamic stability will improve  Description: Hemodynamic stability will improve  Outcome: Ongoing

## 2020-07-04 NOTE — PROGRESS NOTES
CC: SOB    HPI/Update: Resting comfortably in bed today. Not complaining o f angina. Past Medical History:   Diagnosis Date    Hypertension     SVT (supraventricular tachycardia) ([facility])        PE: Blood pressure 120/72, pulse 88, temperature 97.5 °F (36.4 °C), temperature source Oral, resp. rate 20, height 5' 11.5\" (1.816 m), weight 179 lb 11.2 oz (81.5 kg), SpO2 100 %. General (appearance): Well devel. No distress  Eyes: Anicteric  Neck: Supple. JVD diff to asses  Ears/Nose/Mouth/Thorat: No cyanosis  CV: Irreg irreg. + abisai   Respiratory:  Clear, normal respiratory effort  GI: abd soft  Skin: Warm, dry. No rashes  Neuro/Psych: Alert and cooperative. Appropriate behavior  Ext:  No c/c. Pulses:  2+ carotid    Lab Results   Component Value Date    WBC 3.8 (L) 07/01/2020    HGB 10.2 (L) 07/01/2020    HCT 31.4 (L) 07/01/2020    MCV 93.5 07/01/2020     07/01/2020     Lab Results   Component Value Date     (L) 07/04/2020    K 3.9 07/04/2020     07/04/2020    CO2 24 07/04/2020    BUN 52 (H) 07/04/2020    CREATININE 2.4 (H) 07/04/2020    GLUCOSE 93 07/04/2020    CALCIUM 9.6 07/04/2020    PROT 7.0 07/01/2020    LABALBU 3.6 07/01/2020    BILITOT 0.8 07/01/2020    ALKPHOS 100 07/01/2020    AST 23 07/01/2020    ALT 12 07/01/2020    LABGLOM 25 (A) 07/04/2020    GFRAA 30 (A) 07/04/2020     Lab Results   Component Value Date    CKTOTAL 71 06/19/2011    CKMB 1.4 06/19/2011    TROPONINI 0.03 (H) 07/01/2020     Lab Results   Component Value Date    INR 1.1 06/18/2011    PROTIME 13.5 06/18/2011 6/2019 TTE: EF 50-55%. Indeterminate diastolic function.  Mild RVE and dysfunction, moderate TR    1/2019 Scottie: Normal    7/1/2020 ECG: A fib, RBBB      Current Facility-Administered Medications:     furosemide (LASIX) tablet 40 mg, 40 mg, Oral, BID, Ginger Jiménez MD, 40 mg at 07/04/20 0820    vitamin D3 (CHOLECALCIFEROL) tablet 400 Units, 400 Units, Oral, Daily, Debbie Cifuentes MD, 400 Units at 07/04/20 0820    sodium chloride flush 0.9 % injection 10 mL, 10 mL, Intravenous, 2 times per day, Solomon Whitman MD, 10 mL at 07/04/20 0820    sodium chloride flush 0.9 % injection 10 mL, 10 mL, Intravenous, PRN, Solomon Whitman MD    acetaminophen (TYLENOL) tablet 650 mg, 650 mg, Oral, Q6H PRN **OR** acetaminophen (TYLENOL) suppository 650 mg, 650 mg, Rectal, Q6H PRN, Solomon Whitman MD    polyethylene glycol (GLYCOLAX) packet 17 g, 17 g, Oral, Daily PRN, Solomon Whitman MD, 17 g at 07/03/20 1203    promethazine (PHENERGAN) tablet 12.5 mg, 12.5 mg, Oral, Q6H PRN **OR** ondansetron (ZOFRAN) injection 4 mg, 4 mg, Intravenous, Q6H PRN, Solomon Whitman MD    perflutren lipid microspheres (DEFINITY) injection 1.65 mg, 1.5 mL, Intravenous, ONCE PRN, Solomon Whitman MD    famotidine (PEPCID) tablet 20 mg, 20 mg, Oral, Daily, Solomon Whitman MD, 20 mg at 07/04/20 0820    finasteride (PROSCAR) tablet 5 mg, 5 mg, Oral, Daily, Solomon Whitman MD, 5 mg at 07/04/20 0820    atorvastatin (LIPITOR) tablet 20 mg, 20 mg, Oral, Daily, Solomon Whitman MD, 20 mg at 07/04/20 0820    timolol (TIMOPTIC) 0.5 % ophthalmic solution 1 drop, 1 drop, Both Eyes, Nightly, Solomon Whitman MD, 1 drop at 07/03/20 2150    apixaban (ELIQUIS) tablet 2.5 mg, 2.5 mg, Oral, BID, Solomon Whitman MD, 2.5 mg at 07/04/20 0820    metoprolol succinate (TOPROL XL) extended release tablet 25 mg, 25 mg, Oral, Daily, Solomon Whitman MD, 25 mg at 07/04/20 0820    I/O: -5.3L    Issues:  80 y. o. with volume overloade.   Issues:  -Acute on chronic diastolic CHF  -Chronic atrial fibrillation  -Biotronic pacemaker (post unsuccessful AVJ ablation)  -Essential HTN    Recs:  -Apixaban 2.5 bid for stroke prevention  -Atorvastatin  -Toprol 25 mg daily  -Cont oral lasix  -Cont to hold ACE  -Needs chf f/u early next week if d/c today or over weekend.  -Needs BMP, Mag, and BNP early-mid next week as well.  -See Baudilio Kendall next week, Dr. Francesca Bullard in 2-4 weeks depending on the visit with [facility] if he goes home soon  -F/U on nephrology recs  -May have to accept a higher Cr to keep euvolemic. Marye Cassis A. Caresse Klinefelter, MD, [facility], [facility]

## 2020-07-05 VITALS
TEMPERATURE: 97.4 F | OXYGEN SATURATION: 99 % | BODY MASS INDEX: 23.01 KG/M2 | RESPIRATION RATE: 18 BRPM | HEART RATE: 76 BPM | WEIGHT: 169.9 LBS | HEIGHT: 72 IN | DIASTOLIC BLOOD PRESSURE: 73 MMHG | SYSTOLIC BLOOD PRESSURE: 124 MMHG

## 2020-07-05 LAB
ANION GAP SERPL CALCULATED.3IONS-SCNC: 13 MMOL/L (ref 3–16)
BASOPHILS ABSOLUTE: 0 K/UL (ref 0–0.2)
BASOPHILS RELATIVE PERCENT: 0.4 %
BUN BLDV-MCNC: 57 MG/DL (ref 7–20)
CALCIUM SERPL-MCNC: 9.6 MG/DL (ref 8.3–10.6)
CHLORIDE BLD-SCNC: 101 MMOL/L (ref 99–110)
CO2: 24 MMOL/L (ref 21–32)
CREAT SERPL-MCNC: 2.6 MG/DL (ref 0.8–1.3)
EKG ATRIAL RATE: 71 BPM
EKG DIAGNOSIS: NORMAL
EKG Q-T INTERVAL: 464 MS
EKG QRS DURATION: 174 MS
EKG QTC CALCULATION (BAZETT): 518 MS
EKG R AXIS: -66 DEGREES
EKG T AXIS: -16 DEGREES
EKG VENTRICULAR RATE: 75 BPM
EOSINOPHILS ABSOLUTE: 0.1 K/UL (ref 0–0.6)
EOSINOPHILS RELATIVE PERCENT: 2.2 %
GFR AFRICAN AMERICAN: 28
GFR NON-AFRICAN AMERICAN: 23
GLUCOSE BLD-MCNC: 94 MG/DL (ref 70–99)
HCT VFR BLD CALC: 29.4 % (ref 40.5–52.5)
HEMOGLOBIN: 9.8 G/DL (ref 13.5–17.5)
LYMPHOCYTES ABSOLUTE: 0.5 K/UL (ref 1–5.1)
LYMPHOCYTES RELATIVE PERCENT: 12 %
MAGNESIUM: 1.9 MG/DL (ref 1.8–2.4)
MCH RBC QN AUTO: 30.9 PG (ref 26–34)
MCHC RBC AUTO-ENTMCNC: 33.4 G/DL (ref 31–36)
MCV RBC AUTO: 92.4 FL (ref 80–100)
MONOCYTES ABSOLUTE: 0.9 K/UL (ref 0–1.3)
MONOCYTES RELATIVE PERCENT: 20.6 %
NEUTROPHILS ABSOLUTE: 2.8 K/UL (ref 1.7–7.7)
NEUTROPHILS RELATIVE PERCENT: 64.8 %
PDW BLD-RTO: 15.8 % (ref 12.4–15.4)
PLATELET # BLD: 152 K/UL (ref 135–450)
PMV BLD AUTO: 8.5 FL (ref 5–10.5)
POTASSIUM SERPL-SCNC: 3.8 MMOL/L (ref 3.5–5.1)
RBC # BLD: 3.18 M/UL (ref 4.2–5.9)
SODIUM BLD-SCNC: 138 MMOL/L (ref 136–145)
WBC # BLD: 4.3 K/UL (ref 4–11)

## 2020-07-05 PROCEDURE — 6370000000 HC RX 637 (ALT 250 FOR IP): Performed by: INTERNAL MEDICINE

## 2020-07-05 PROCEDURE — 36415 COLL VENOUS BLD VENIPUNCTURE: CPT

## 2020-07-05 PROCEDURE — 97161 PT EVAL LOW COMPLEX 20 MIN: CPT

## 2020-07-05 PROCEDURE — 80048 BASIC METABOLIC PNL TOTAL CA: CPT

## 2020-07-05 PROCEDURE — 6370000000 HC RX 637 (ALT 250 FOR IP): Performed by: STUDENT IN AN ORGANIZED HEALTH CARE EDUCATION/TRAINING PROGRAM

## 2020-07-05 PROCEDURE — 2580000003 HC RX 258: Performed by: STUDENT IN AN ORGANIZED HEALTH CARE EDUCATION/TRAINING PROGRAM

## 2020-07-05 PROCEDURE — 6360000002 HC RX W HCPCS: Performed by: INTERNAL MEDICINE

## 2020-07-05 PROCEDURE — 83735 ASSAY OF MAGNESIUM: CPT

## 2020-07-05 PROCEDURE — 93010 ELECTROCARDIOGRAM REPORT: CPT | Performed by: INTERNAL MEDICINE

## 2020-07-05 PROCEDURE — 99233 SBSQ HOSP IP/OBS HIGH 50: CPT | Performed by: INTERNAL MEDICINE

## 2020-07-05 PROCEDURE — 85025 COMPLETE CBC W/AUTO DIFF WBC: CPT

## 2020-07-05 PROCEDURE — 97116 GAIT TRAINING THERAPY: CPT

## 2020-07-05 RX ORDER — POTASSIUM CHLORIDE 20 MEQ/1
20 TABLET, EXTENDED RELEASE ORAL ONCE
Status: COMPLETED | OUTPATIENT
Start: 2020-07-05 | End: 2020-07-05

## 2020-07-05 RX ORDER — FUROSEMIDE 40 MG/1
40 TABLET ORAL DAILY
Qty: 60 TABLET | Refills: 3 | Status: SHIPPED | OUTPATIENT
Start: 2020-07-05 | End: 2020-07-31

## 2020-07-05 RX ORDER — METOPROLOL SUCCINATE 25 MG/1
TABLET, EXTENDED RELEASE ORAL
Qty: 30 TABLET | Refills: 1 | Status: SHIPPED | OUTPATIENT
Start: 2020-07-05 | End: 2020-07-05 | Stop reason: SDUPTHER

## 2020-07-05 RX ORDER — FUROSEMIDE 40 MG/1
40 TABLET ORAL DAILY
Qty: 60 TABLET | Refills: 3 | Status: SHIPPED | OUTPATIENT
Start: 2020-07-05 | End: 2020-07-05 | Stop reason: SDUPTHER

## 2020-07-05 RX ORDER — MAGNESIUM SULFATE 1 G/100ML
1 INJECTION INTRAVENOUS ONCE
Status: COMPLETED | OUTPATIENT
Start: 2020-07-05 | End: 2020-07-05

## 2020-07-05 RX ORDER — FUROSEMIDE 40 MG/1
40 TABLET ORAL DAILY
Status: DISCONTINUED | OUTPATIENT
Start: 2020-07-06 | End: 2020-07-05 | Stop reason: HOSPADM

## 2020-07-05 RX ORDER — METOPROLOL SUCCINATE 25 MG/1
TABLET, EXTENDED RELEASE ORAL
Qty: 30 TABLET | Refills: 1 | Status: SHIPPED | OUTPATIENT
Start: 2020-07-05 | End: 2020-07-08

## 2020-07-05 RX ORDER — BISACODYL 10 MG
10 SUPPOSITORY, RECTAL RECTAL ONCE
Status: COMPLETED | OUTPATIENT
Start: 2020-07-05 | End: 2020-07-05

## 2020-07-05 RX ADMIN — DOCUSATE SODIUM 50 MG AND SENNOSIDES 8.6 MG 2 TABLET: 8.6; 5 TABLET, FILM COATED ORAL at 09:34

## 2020-07-05 RX ADMIN — APIXABAN 2.5 MG: 2.5 TABLET, FILM COATED ORAL at 09:33

## 2020-07-05 RX ADMIN — ATORVASTATIN CALCIUM 20 MG: 20 TABLET, FILM COATED ORAL at 09:33

## 2020-07-05 RX ADMIN — FAMOTIDINE 20 MG: 20 TABLET ORAL at 09:34

## 2020-07-05 RX ADMIN — FINASTERIDE 5 MG: 5 TABLET, FILM COATED ORAL at 09:33

## 2020-07-05 RX ADMIN — MAGNESIUM SULFATE HEPTAHYDRATE 1 G: 1 INJECTION, SOLUTION INTRAVENOUS at 12:02

## 2020-07-05 RX ADMIN — POTASSIUM CHLORIDE 20 MEQ: 20 TABLET, EXTENDED RELEASE ORAL at 12:02

## 2020-07-05 RX ADMIN — METOPROLOL SUCCINATE 25 MG: 25 TABLET, EXTENDED RELEASE ORAL at 09:33

## 2020-07-05 RX ADMIN — BISACODYL 10 MG: 10 SUPPOSITORY RECTAL at 13:20

## 2020-07-05 RX ADMIN — FUROSEMIDE 40 MG: 40 TABLET ORAL at 09:33

## 2020-07-05 RX ADMIN — Medication 10 ML: at 09:10

## 2020-07-05 RX ADMIN — CHOLECALCIFEROL (VITAMIN D3) 10 MCG (400 UNIT) TABLET 400 UNITS: at 09:34

## 2020-07-05 RX ADMIN — POLYETHYLENE GLYCOL 3350 17 G: 17 POWDER, FOR SOLUTION ORAL at 09:34

## 2020-07-05 ASSESSMENT — PAIN SCALES - GENERAL
PAINLEVEL_OUTOF10: 0
PAINLEVEL_OUTOF10: 0

## 2020-07-05 NOTE — DISCHARGE SUMMARY
Hospital Medicine Discharge Summary    Patient ID: Patti Brittle   Gender: male  : 1921   Age: 80 y.o. MRN: [id number]  Code Status: Full Code  Patient's PCP: Bari Howard MD    Admit Date: 2020     Discharge Date: 2020    Admitting Physician: Babita Crowley MD     Discharge Physician: Ignacio Morales MD     Discharge Diagnoses:   1. Acute on chronic diastolic congestive heart failure  2. CKD stage 4  3. Left upper extremity swelling, ruled out acute DVT  4. Anemia likely due to CKD, iron levels normal  5. Afib, on anticoagulation  6. Sick Sinus Syndrome s/p pacemaker placement; no events per pacemaker interrogation  7. Hypertension       The patient was seen and examined on day of discharge and this discharge summary is in conjunction with any daily progress note from day of discharge. Hospital Course: Mark Hall 80 y.o. male w/ PMHx of hypertension, SVT, s/p Biotronik PPM placement after unsuccessful AV junction ablation, Atrial fibrillation, Sick sinus syndrome, noted poor right atrial sensing (unable to determine AF as it may be under sensed) was sent from PCP office, with anasarca and 10 lbs weight gain, progressive worse in 2-3 months, with dyspnea worse with exertion, using more pillows at night, c/o chronic cough only in the morning productive of brownish sputum. He was on lasix oral at home without improvement. Left arm was swollen more than the right arm; he mentioned that he noted that the increased swelling in the left arm started after a mechanical fall 6 weeks ago. Cardiology and nephrology teams were consulted. Placed on Lasix 40mg IV BID and has had good response, then transitioned to Lasix 40mg PO BID on 7/3. He follows with Dr. Nacho Jaime and Dr. Ramon vázquez cardiology. Pt has been on bowel regimen and has had a few small bowel movements and has been passing gas. Edema in upper and lower extremities has resolved. Stopped lisinopril.  Pt transitioned to lasix 40mg PO daily as home medication. Pt medically stable and ready for discharge today. Spoke with daughter Mrs. Ricky Rodriguez (#[phone] (cell)) regarding setting up an appointment with the heart failure clinic and obtaining outpatient labs indicated in discharge paperwork before going to the appointment. Disposition:  Home    Physical Exam Performed:     /73   Pulse 76   Temp 97.4 °F (36.3 °C) (Oral)   Resp 18   Ht 5' 11.5\" (1.816 m)   Wt 169 lb 14.4 oz (77.1 kg)   SpO2 99%   BMI 23.37 kg/m²       General appearance: No apparent distress, appears stated age and cooperative. HEENT: Pupils equal, round, and reactive to light. Conjunctivae/corneas clear. Respiratory:  Normal respiratory effort. Clear to auscultation, bilaterally without Rales/Wheezes/Rhonchi. Cardiovascular: Pacemaker in place  Abdomen: Soft, non-tender, non-distended. Hyperactive bowel sounds (associated with recent bowel regimen). Musculoskeletal: No edema in lower extremities bilaterally; No edema in upper extremities bilaterally; no groin edema. Skin: Skin color, texture, turgor normal.  No rashes or lesions. Neurologic:  Neurovascularly intact without any focal sensory/motor deficits. Cranial nerves: II-XII intact, grossly non-focal.  Psychiatric: Alert and oriented, thought content appropriate, normal insight    Labs:  For convenience and continuity at follow-up the following most recent labs are provided:      CBC:    Lab Results   Component Value Date    WBC 4.3 07/05/2020    HGB 9.8 07/05/2020    HCT 29.4 07/05/2020     07/05/2020       Renal:    Lab Results   Component Value Date     07/05/2020    K 3.8 07/05/2020    K 4.6 07/01/2020     07/05/2020    CO2 24 07/05/2020    BUN 57 07/05/2020    CREATININE 2.6 07/05/2020    CALCIUM 9.6 07/05/2020    PHOS 3.7 11/05/2019         Significant Diagnostic Studies    Radiology:   VL Extremity Venous Left   Final Result      XR CHEST PORTABLE   Final Result      No acute disease                Consults:     IP CONSULT TO HOSPITALIST  IP CONSULT TO HEART FAILURE NURSE/COORDINATOR  IP CONSULT TO DIETITIAN  IP CONSULT TO CARDIOLOGY  IP CONSULT TO NEPHROLOGY  IP CONSULT TO HOME CARE NEEDS  IP CONSULT TO HEART FAILURE NURSE/COORDINATOR  IP CONSULT TO DIETITIAN  IP CONSULT TO CARDIOLOGY    Disposition:  Home    Condition at Discharge: Stable    Discharge Instructions/Follow-up:  Follow up with heart failure clinic and obtain the indicated labs before the appointment    Code Status:  Full Code    Activity: activity as tolerated    Diet: cardiac diet      Discharge Medications:     Current Discharge Medication List           Details   metoprolol succinate (TOPROL XL) 25 MG extended release tablet TAKE 1 TABLET EVERY DAY  Qty: 30 tablet, Refills: 1    Associated Diagnoses: Essential hypertension; Atrial fibrillation, unspecified type (HCC)      furosemide (LASIX) 40 MG tablet Take 1 tablet by mouth daily  Qty: 60 tablet, Refills: 3              Details   apixaban (ELIQUIS) 2.5 MG TABS tablet Take 1 tablet by mouth 2 times daily  Qty: 60 tablet, Refills: 5      finasteride (PROSCAR) 5 MG tablet Take 5 mg by mouth daily       simvastatin (ZOCOR) 40 MG tablet TAKE 1 TABLET IN THE EVENING  Refills: 2      timolol (TIMOPTIC-XE) 0.5 % ophthalmic gel-forming Place 1 drop into both eyes nightly            Signed:    Ada Cerna MD   7/5/2020      Thank you Ulysses Arvin, MD for the opportunity to be involved in this patient's care. If you have any questions or concerns please feel free to contact me at [phone]. I have personally seen and evaluated this patient. I discussed findings and management with the resident, on 07/05/20  and agree as documented in this note     My time spent reviewing discharge plan and follow ups with resident, along with performing independent review of discharge medicines along with counseling the patient exceeds 30 minutes.     Deya Fabian [address] [facility]  Attending Physician

## 2020-07-05 NOTE — PROGRESS NOTES
AVS instructions given over the phone to pt's daughter. All questions were answered and daughter verbalized understanding. AVS also gone over with patient, who also verbalized understanding.

## 2020-07-05 NOTE — PROGRESS NOTES
Nurse notified, Gait belt      OutComes Score                                                  AM-PAC Score  AM-PAC Inpatient Mobility Raw Score : 19 (07/05/20 1057)  AM-PAC Inpatient T-Scale Score : 45.44 (07/05/20 1057)  Mobility Inpatient CMS 0-100% Score: 41.77 (07/05/20 1057)  Mobility Inpatient CMS G-Code Modifier : CK (07/05/20 1057)          Goals  Short term goals  Time Frame for Short term goals: discharge  Short term goal 1: patient will perform bed mobility with modified independence  Short term goal 2: patient will perform transfers sit<> stand with modified independence  Short term goal 3: patient will ambulate 80' with FWW and modified independence  Patient Goals   Patient goals : to go home       Therapy Time   Individual Concurrent Group Co-treatment   Time In 1009         Time Out 1033         Minutes 24         Timed Code Treatment Minutes: 9 Minutes    Timed Code Treatment Minutes:  9 Minutes    Total Treatment Minutes:    9 minutes treatment + 15 minutes evaluation = 24 total treatment minutes    If patient is discharged from the hospital prior to next treatment session, this note will serve as the discharge summary.      Enrique BALDWIN [address]

## 2020-07-05 NOTE — CARE COORDINATION
Orders faxed to [facility] for home care at discharge. Pt discharging home with family. Pt also has COA services. Addendum: received phone call from [facility] at [facility] they are not in network with pt's insurance. Referral sent to [facility].   Jessika Wong RN, BSN, [address]  Case Management Department  [phone]

## 2020-07-05 NOTE — PROGRESS NOTES
Pt discharged to home with daughter via private transport. Pt left with IV and tele removed. Pt and pt's daughter educated on AVS and both verbalized understanding. Pt also discharged with paper prescriptions. Pt left the floor with all personal belongings.

## 2020-07-05 NOTE — PROGRESS NOTES
CC: SOB    HPI/Update: Laying FLAT in bed. No angina. No sob. Has some leg edema. PE:  Blood pressure 120/64, pulse 87, temperature 98.6 °F (37 °C), temperature source Oral, resp. rate 18, height 5' 11.5\" (1.816 m), weight 169 lb 14.4 oz (77.1 kg), SpO2 96 %. General (appearance): Well devel. No distress  Eyes: Anicteric  Neck: Supple. JVD diff to asses  Ears/Nose/Mouth/Thorat: No cyanosis  CV: Irreg irreg. + abisai   Respiratory:  Clear, normal respiratory effort  GI: abd soft  Skin: Warm, dry. No rashes  Neuro/Psych: Alert and cooperative. Appropriate behavior  Ext:  No c/c. + edema  Pulses:  2+ carotid    Lab Results   Component Value Date    WBC 4.3 07/05/2020    HGB 9.8 (L) 07/05/2020    HCT 29.4 (L) 07/05/2020    MCV 92.4 07/05/2020     07/05/2020     Lab Results   Component Value Date     07/05/2020    K 3.8 07/05/2020     07/05/2020    CO2 24 07/05/2020    BUN 57 (H) 07/05/2020    CREATININE 2.6 (H) 07/05/2020    GLUCOSE 94 07/05/2020    CALCIUM 9.6 07/05/2020    PROT 7.0 07/01/2020    LABALBU 3.6 07/01/2020    BILITOT 0.8 07/01/2020    ALKPHOS 100 07/01/2020    AST 23 07/01/2020    ALT 12 07/01/2020    LABGLOM 23 (A) 07/05/2020    GFRAA 28 (A) 07/05/2020     Lab Results   Component Value Date    CKTOTAL 71 06/19/2011    CKMB 1.4 06/19/2011    TROPONINI 0.03 (H) 07/01/2020     Lab Results   Component Value Date    INR 1.1 06/18/2011    PROTIME 13.5 06/18/2011 6/2019 TTE: EF 50-55%. Indeterminate diastolic function.  Mild RVE and dysfunction, moderate TR    1/2019 Scottie: Normal    7/1/2020 ECG: A fib, RBBB      Current Facility-Administered Medications:     glycerin (ADULT) suppository 2 g, 1 suppository, Rectal, Once, Sue Funes MD    polyethylene glycol (GLYCOLAX) packet 17 g, 17 g, Oral, Daily, Sue Funes MD, 17 g at 07/05/20 0934    sennosides-docusate sodium (SENOKOT-S) 8.6-50 MG tablet 2 tablet, 2 tablet, Oral, Daily, Sue Funes MD, 2 tablet at 07/05/20 0934   furosemide (LASIX) tablet 40 mg, 40 mg, Oral, BID, Ariel Martinez MD, 40 mg at 07/05/20 0933    vitamin D3 (CHOLECALCIFEROL) tablet 400 Units, 400 Units, Oral, Daily, Leanna Aguayo MD, 400 Units at 07/05/20 0934    sodium chloride flush 0.9 % injection 10 mL, 10 mL, Intravenous, 2 times per day, Leanna Aguayo MD, 10 mL at 07/04/20 2006    sodium chloride flush 0.9 % injection 10 mL, 10 mL, Intravenous, PRN, Leanna Aguayo MD    acetaminophen (TYLENOL) tablet 650 mg, 650 mg, Oral, Q6H PRN **OR** acetaminophen (TYLENOL) suppository 650 mg, 650 mg, Rectal, Q6H PRN, Leanna Aguayo MD    promethazine (PHENERGAN) tablet 12.5 mg, 12.5 mg, Oral, Q6H PRN **OR** ondansetron (ZOFRAN) injection 4 mg, 4 mg, Intravenous, Q6H PRN, Leanna Aguayo MD    perflutren lipid microspheres (DEFINITY) injection 1.65 mg, 1.5 mL, Intravenous, ONCE PRN, Leanna Aguayo MD    famotidine (PEPCID) tablet 20 mg, 20 mg, Oral, Daily, Leanna Aguayo MD, 20 mg at 07/05/20 0934    finasteride (PROSCAR) tablet 5 mg, 5 mg, Oral, Daily, Leanna Aguayo MD, 5 mg at 07/05/20 0933    atorvastatin (LIPITOR) tablet 20 mg, 20 mg, Oral, Daily, Leanna Aguayo MD, 20 mg at 07/05/20 0933    timolol (TIMOPTIC) 0.5 % ophthalmic solution 1 drop, 1 drop, Both Eyes, Nightly, Leanna Aguayo MD, 1 drop at 07/04/20 2005    apixaban (ELIQUIS) tablet 2.5 mg, 2.5 mg, Oral, BID, Leanna Aguayo MD, 2.5 mg at 07/05/20 0933    metoprolol succinate (TOPROL XL) extended release tablet 25 mg, 25 mg, Oral, Daily, Leanna Aguayo MD, 25 mg at 07/05/20 1720         Issues:  80 y. o. with volume overloade. Issues:  -Acute on chronic diastolic CHF  -Chronic atrial fibrillation  -Biotronic pacemaker (post unsuccessful AVJ ablation)  -Essential HTN    Recs:  -Apixaban 2.5 bid for stroke prevention  -Atorvastatin  -Toprol 25 mg daily  -Cont oral lasix  -Cont to hold ACE  -Needs chf f/u early next week if d/c today.   -Needs BMP, Mag, and BNP early-mid next week as well.  -See Jovita Umaña next week, Dr. Tsering Bailey in 2-4 weeks depending on the visit with Jovita Umaña if he goes home soon  -F/U on nephrology recs  -May have to accept a higher Cr to keep euvolemic. Would consider d/c today if ok from Nephrology standpoint. I did order 20 mEq K and 1g Mag SO4. After that, consider d/c. Should see Dr. Richie Brown in the next 1-2 weeks as well. At least 35 minutes was spent with the patient/patient and family, over half of which time was spent on counseling and coordinating care for the above plan. Mercedez YEE.  Martine Finn MD, [facility]

## 2020-07-05 NOTE — PROGRESS NOTES
[facility]    [facility]              [phone]                       Plan :     - Hold Lisinopril   - cont with daily lasix  Use once a day lasix   Stop BID  Seems to be stable   No new complaints   Volume much better        Assessment :     Stage IV CKD with acute kidney injury  - 2/2 to cardiorenal syndrome with fluid compression on renal vessels leading to the eventual ROSALINO  - Increase in Cr levels from 1.8 on 2/18/19 to 2.3 today  Plan   - Hold Lisinopril   - Strict I's & O's   - Monitor urine output  - Monitor Cr  - Free light chains  - Check Vit. D  - Check PTH    CHF (last echo 6/14/19 EF of 50-55%, no RWMA, RVH with mildly depressed systolic function)  - Year long gradual increase in lower extremity edema with worsening in the last 2-3 months  - 2 pillow orthopnea  - SOB on exertion  - +JVD  - pro-BNP 5670   Plan  - Elevate head of the bed   - Continue diuresis with Lasix 40mg IV BID  - Continue metoprolol 25mg   - Strict I's & O's  - Get new Echo  - Monitor for signs of clinical improvement: decrease in edema, no dyspnea  - Follow cardiology recs    HTN  - Came in with BP of 166/81; currently well controlled BP of 122/72  Plan  - Hold Lisinopril  - Continue metoprolol 25mg    Anemia  - Drop in Hgb from 11.2 in 2018 to 10.2 on 7/1/19  Plan  - Check Iron studies        [facility] would like to thank Beverly Shine MD   for opportunity to serve this patient      Please call with questions at-   24 Hrs Answering service [phone] or  7 am- 5 pm via Perfect serve or cell phone  Gabriele Nelson          CC/reason for consult :     CKD IIIb       HPI :     Warner Reece is a 80 y.o. male with a PMH of CAD, AF (on Eliquis 2.5mg), CHF, CKD IIIb, HTN presented to the hospital on 7/1/2020 with a year long worsening edema that worsened in the last 2-3 months and now is present in his upper extremity, lower extremity and groin as well. He also endorsed a recent 10lb gain.  Pt endorses SOB on exertion that has been chronic for years, right upper chest pain rarely and uses 2 pillows at night. Pt. Endorses chronic cough in the morning with productive brownish sputum in the morning. In the ED, he was given 40mg IV lasix once, labs CXR was done and showed no acute pathology, there was some mild cephalization of the pulmonary vessels, and placed on his home dose of 40mg Lasix BID. Interval History:     Feels better    ROS:     Seen with - none     positives in bold   Constitutional:  fever, chills, weakness, weight change, fatigue  Skin:  rash, pruritus, hair loss, bruising, dry skin, petechiae  Head, Face, Neck   headaches, swelling,  cervical adenopathy  Respiratory: shortness of breath, cough, or wheezing  Cardiovascular: chest pain, palpitations, dizzy, edema  Gastrointestinal: nausea, vomiting, diarrhea, constipation,belly pain    Yellow skin, blood in stool  Musculoskeletal:  back pain, muscle weakness, gait problems,       joint pain, right arm swelling compared to left,  2+ pitting edema in the lower extremity. Genitourinary:  dysuria, poor urine flow, flank pain, blood in urine  Neurologic:  vertigo, TIA'S, syncope, seizures, focal weakness  Psychosocial:  insomnia, anxiety, or depression. All other remaining systems are negative or unable to obtain        PMH/PSH/SH/Family History:     Past Medical History:   Diagnosis Date    Hypertension     SVT (supraventricular tachycardia) (HCC)        Past Surgical History:   Procedure Laterality Date    CARPAL TUNNEL RELEASE      PROSTATE SURGERY      SINUS SURGERY          reports that he quit smoking about 25 years ago. His smoking use included pipe and cigars. He has never used smokeless tobacco. He reports current alcohol use. He reports that he does not use drugs. family history includes Heart Disease in his sister; Kidney Disease in his sister.          Medication:     Current Facility-Administered Medications: glycerin (ADULT) suppository 2 g, 1 suppository, Rectal, Once  polyethylene glycol (GLYCOLAX) packet 17 g, 17 g, Oral, Daily  sennosides-docusate sodium (SENOKOT-S) 8.6-50 MG tablet 2 tablet, 2 tablet, Oral, Daily  furosemide (LASIX) tablet 40 mg, 40 mg, Oral, BID  vitamin D3 (CHOLECALCIFEROL) tablet 400 Units, 400 Units, Oral, Daily  sodium chloride flush 0.9 % injection 10 mL, 10 mL, Intravenous, 2 times per day  sodium chloride flush 0.9 % injection 10 mL, 10 mL, Intravenous, PRN  acetaminophen (TYLENOL) tablet 650 mg, 650 mg, Oral, Q6H PRN **OR** acetaminophen (TYLENOL) suppository 650 mg, 650 mg, Rectal, Q6H PRN  promethazine (PHENERGAN) tablet 12.5 mg, 12.5 mg, Oral, Q6H PRN **OR** ondansetron (ZOFRAN) injection 4 mg, 4 mg, Intravenous, Q6H PRN  perflutren lipid microspheres (DEFINITY) injection 1.65 mg, 1.5 mL, Intravenous, ONCE PRN  famotidine (PEPCID) tablet 20 mg, 20 mg, Oral, Daily  finasteride (PROSCAR) tablet 5 mg, 5 mg, Oral, Daily  atorvastatin (LIPITOR) tablet 20 mg, 20 mg, Oral, Daily  timolol (TIMOPTIC) 0.5 % ophthalmic solution 1 drop, 1 drop, Both Eyes, Nightly  apixaban (ELIQUIS) tablet 2.5 mg, 2.5 mg, Oral, BID  metoprolol succinate (TOPROL XL) extended release tablet 25 mg, 25 mg, Oral, Daily       Vitals :     Vitals:    07/05/20 1200   BP: 124/73   Pulse: 76   Resp: 18   Temp: 97.4 °F (36.3 °C)   SpO2: 99%       I & O :       Intake/Output Summary (Last 24 hours) at 7/5/2020 1513  Last data filed at 7/5/2020 1332  Gross per 24 hour   Intake 1360.4 ml   Output 1475 ml   Net -114.6 ml        Physical Examination :     General appearance: Anxious- no, distressed- no, in good spirits- yes  HEENT: Lips- normal, teeth- ok , oral mucosa- moist  Neck : Mass- no, appears symmetrical, JVD- not visible  Respiratory: Respiratory effort-  no, wheeze- no, crackles - no  Cardiovascular:  Ausculation- No M/R/G, Edema - 2+ pitting edema in the lower extremity, +JVD  Abdomen: visible mass- no, distention- no, scar- no, tenderness- no                            hepatosplenomegaly-  no  Musculoskeletal:  right arm swelling compared to left, 2+ pitting edema in the lower extremity.   clubbing no,cyanosis- no , digital ischemia- no                           muscle strength- grossly normal , tone - grossly normal  Skin: rashes- no , ulcers- no, induration- no, tightening - no  Psychiatric:  Judgement and insight- normal           AAO X 3       LABS:     Recent Labs     07/05/20  0636   WBC 4.3   HGB 9.8*   HCT 29.4*        Recent Labs     07/03/20  0624 07/04/20  0618 07/05/20  0636    135* 138   K 4.0 3.9 3.8    101 101   CO2 23 24 24   BUN 55* 52* 57*   CREATININE 2.4* 2.4* 2.6*   GLUCOSE 92 93 94   MG 2.00 2.00 1.90          Thanks  Nephrology  [address], [address]  Office: [phone]  Fax: [phone]

## 2020-07-08 ENCOUNTER — OFFICE VISIT (OUTPATIENT)
Dept: CARDIOLOGY CLINIC | Age: 85
End: 2020-07-08
Payer: MEDICARE

## 2020-07-08 VITALS
WEIGHT: 173.8 LBS | SYSTOLIC BLOOD PRESSURE: 90 MMHG | BODY MASS INDEX: 23.9 KG/M2 | DIASTOLIC BLOOD PRESSURE: 58 MMHG | TEMPERATURE: 98.2 F | HEART RATE: 88 BPM

## 2020-07-08 PROBLEM — I50.32 CHRONIC HEART FAILURE WITH PRESERVED EJECTION FRACTION (HFPEF) (HCC): Status: ACTIVE | Noted: 2020-07-01

## 2020-07-08 PROCEDURE — 99214 OFFICE O/P EST MOD 30 MIN: CPT | Performed by: INTERNAL MEDICINE

## 2020-07-08 RX ORDER — METOPROLOL SUCCINATE 25 MG/1
TABLET, EXTENDED RELEASE ORAL
Qty: 90 TABLET | Refills: 3 | Status: ON HOLD | OUTPATIENT
Start: 2020-07-08 | End: 2021-05-06 | Stop reason: HOSPADM

## 2020-07-08 NOTE — PROGRESS NOTES
Cc: HFpEF, CAF    HPI:     Mr Prince Jordan is a 79 yo man with h/o HFpEF, CAF s/p unsuccessful AVJ ablation and Biotronik dual chamber PPM in 07/2011, follows with Dr. Sybil Aguirre, CKD (BL cr 1.8 - 2.0). The RA lead appears to be dislodged and undersensing afib. Device was changed to VVI to minimize RV pacing (was pacing the RV 90%) in 02/2019.      Echo 2017: LVEF 50%, moderate MR, nl RV, mild valve disease, RVSP 42     Nicki 2011: normal.      Echo 06/2019: mild LVH, LVEF 50-55%, indeterminate diastolic due to afib, mild RVD with mild dysfunction, moderate TR     Nicki 06/2019: normal     Patient was admitted 7/1 - 7/5/20 at [facility] for AHF.      ECG 7/1/20: A. fib with controlled ventricular response, right bundle branch block    Patient is following up for a routine visit. He lives independently, his daughter occasionally helps him. He reports no symptoms. He sees Dr Shraddha Lou, nephrology. Histories     Past Medical History:   has a past medical history of Hypertension and SVT (supraventricular tachycardia) ([address] Surgical History:   has a past surgical history that includes Prostate surgery; sinus surgery; and Carpal tunnel release. Social History:   reports that he quit smoking about 25 years ago. His smoking use included pipe and cigars. He has never used smokeless tobacco. He reports current alcohol use. He reports that he does not use drugs. Family History:  No evidence for sudden cardiac death or premature CAD      Medications:     Home medications were reviewed and are listed below    Prior to Admission medications    Medication Sig Start Date End Date Taking?  Authorizing Provider   metoprolol succinate (TOPROL XL) 25 MG extended release tablet TAKE 1 TABLET EVERY DAY 7/8/20  Yes Bryn Owens MD   furosemide (LASIX) 40 MG tablet Take 1 tablet by mouth daily 7/5/20  Yes Ligia Mcclendon MD   apixaban (ELIQUIS) 2.5 MG TABS tablet Take 1 tablet by mouth 2 times daily 12/12/19  Yes Winslow Merlin, MD timolol (TIMOPTIC-XE) 0.5 % ophthalmic gel-forming Place 1 drop into both eyes nightly    Yes Historical Provider, MD   finasteride (PROSCAR) 5 MG tablet Take 5 mg by mouth daily  3/5/17  Yes Historical Provider, MD   simvastatin (ZOCOR) 40 MG tablet TAKE 1 TABLET IN THE EVENING 1/5/17  Yes Historical Provider, MD          Allergy:     Patient has no known allergies. Review of Systems:     All 12 point review of symptoms completed. Pertinent positives identified in the HPI, all other review of symptoms negative as below. CONSTITUTIONAL: No fatigue  SKIN: No rash or pruritis. EYES: No visual changes or diplopia. No scleral icterus. ENT: No Headaches, hearing loss or vertigo. No mouth sores or sore throat. CARDIOVASCULAR: No chest pain/chest pressure/chest discomfort. No palpitations. No edema. RESPIRATORY: No dyspnea. No cough or wheezing, no sputum production. GASTROINTESTINAL: No N/V/D. No abdominal pain, appetite loss, blood in stools. GENITOURINARY: No dysuria, trouble voiding, or hematuria. MUSCULOSKELETAL:  No gait disturbance, weakness or joint complaints. NEUROLOGICAL: No headache, diplopia, change in muscle strength, numbness or tingling. No change in gait, balance, coordination, mood, affect, memory, mentation, behavior. PSHYCH: No anxiety, loss of interest, change in sexual behavior, feelings of self-harm, or confusion. ENDOCRINE: No excessive thirst, fluid intake, or urination. No tremor. HEMATOLOGIC: No abnormal bruising or bleeding. ALLERGY: No nasal congestion or hives.       Physical Examination:     Vitals:    07/08/20 1427   BP: (!) 90/58   Pulse: 88   Temp: 98.2 °F (36.8 °C)   Weight: 173 lb 12.8 oz (78.8 kg)       Wt Readings from Last 3 Encounters:   07/08/20 173 lb 12.8 oz (78.8 kg)   07/05/20 169 lb 14.4 oz (77.1 kg)   05/18/20 175 lb (79.4 kg)         General Appearance:  Alert, cooperative, no distress, appears stated age Appropriate weight   Head:  Normocephalic, [facility]    Lab Results   Component Value Date    INR 1.1 06/18/2011    PROTIME 13.5 06/18/2011       The ASCVD Risk score (Kofi Lancaster., et al., 2013) failed to calculate for the following reasons: The 2013 ASCVD risk score is only valid for ages 36 to 78    The patient has a prior MI or stroke diagnosis      Assessment / Plan:      Diagnosis Orders   1. Chronic heart failure with preserved ejection fraction (HFpEF) ([facility]     2. Essential hypertension  metoprolol succinate (TOPROL XL) 25 MG extended release tablet   3. Atrial fibrillation, unspecified type (HCC)  metoprolol succinate (TOPROL XL) 25 MG extended release tablet        1. Chronic HFpEF:  Patient is now euvolemic and hemodynamically stable. His atrial fibrillation is rate controlled. -C/w lasix 40 daily     2. CAF:   Per Dr Rebel Barnes     -Rate controlled with Toprol 25 daily  -On Eliquis renally dosed     3. S/p unsuccessful AVJ ablation and PPM implant:   Per Dr Rebel Barnes    4. HLP:   On a statin. Return in about 6 weeks (around 8/19/2020). I have spent 35 minutes of face to face time with the patient with more than 50% spent counseling and coordinating care. I have personally reviewed the reports and images of labs, radiological studies, cardiac studies including ECG's and telemetry, current and old medical records. The note was completed using EMR and Dragon dictation system. Every effort was made to ensure accuracy; however, inadvertent computerized transcription errors may be present. All questions and concerns were addressed to the patient/family. Alternatives to my treatment were discussed. I would like to thank you for providing me the opportunity to participate in the care of your patient. If you have any questions, please do not hesitate to contact me.      Suad Kruger MD, [facility] [address] [address]  Main Office Phone: [phone]  Heart Failure Hotline: [phone]  Fax: [phone]

## 2020-07-09 ENCOUNTER — TELEPHONE (OUTPATIENT)
Dept: CARDIOLOGY CLINIC | Age: 85
End: 2020-07-09

## 2020-07-09 NOTE — TELEPHONE ENCOUNTER
Spoke with [facility] AT [facility] all questions answered about pt's weight from Leopoldo Gaitan on 7/8/20.

## 2020-07-09 NOTE — TELEPHONE ENCOUNTER
Mookie Logan with [facility] would like a call regarding patients weight from yesterdays visit

## 2020-07-13 ENCOUNTER — TELEPHONE (OUTPATIENT)
Dept: CARDIOLOGY CLINIC | Age: 85
End: 2020-07-13

## 2020-07-13 NOTE — TELEPHONE ENCOUNTER
Rajendra zaldivar calling to inform Dr Deepika Yang of pt weight  Friday pt weight was 170 today pt weight increased by 5 lb 175 pt also had swelling in his legs please call [phone] to discuss this matter

## 2020-07-14 NOTE — TELEPHONE ENCOUNTER
Pt at home nurse called, wanted to very pt's eliquis, it's on pt med list but he doesn't have at home, and per [facility] has never received a rx for that medication. Was seen by NIKHIL on 7/8/20 in [facility]. Please call and advise.

## 2020-07-17 ENCOUNTER — TELEPHONE (OUTPATIENT)
Dept: CARDIOLOGY CLINIC | Age: 85
End: 2020-07-17

## 2020-07-17 NOTE — TELEPHONE ENCOUNTER
Asher Conner, please asked patient to double his Lasix to 40 mg p.o. twice daily for a few days until his weight returns to normal and swelling improves.   Thank you

## 2020-07-17 NOTE — TELEPHONE ENCOUNTER
Pt Therapist Magali Ratliff called to report a weight gain of 4 lb in one day to the office please call Lacy Wallis to discuss this matter  [phone]

## 2020-07-20 NOTE — TELEPHONE ENCOUNTER
Informed Avis Mcardle to have pt to take his lasix 40 mg bid for a few days until weight returns to normal and swelling improves. Avis Mcardle verbalized understanding and will inform the pt.

## 2020-07-31 ENCOUNTER — TELEPHONE (OUTPATIENT)
Dept: CARDIOLOGY CLINIC | Age: 85
End: 2020-07-31

## 2020-07-31 RX ORDER — FUROSEMIDE 40 MG/1
40 TABLET ORAL DAILY
Qty: 60 TABLET | Refills: 3 | Status: SHIPPED | OUTPATIENT
Start: 2020-07-31 | End: 2020-12-04

## 2020-07-31 NOTE — TELEPHONE ENCOUNTER
Johny Marquis has gain 5 lbs in two days.     [facility] in please call Dangelo vázquez/ home care  # [phone]

## 2020-08-06 ENCOUNTER — NURSE ONLY (OUTPATIENT)
Dept: CARDIOLOGY CLINIC | Age: 85
End: 2020-08-06
Payer: MEDICARE

## 2020-08-06 PROCEDURE — 93294 REM INTERROG EVL PM/LDLS PM: CPT | Performed by: INTERNAL MEDICINE

## 2020-08-06 PROCEDURE — 93296 REM INTERROG EVL PM/IDS: CPT | Performed by: INTERNAL MEDICINE

## 2020-08-20 ENCOUNTER — TELEPHONE (OUTPATIENT)
Dept: CARDIOLOGY CLINIC | Age: 85
End: 2020-08-20

## 2020-08-20 NOTE — TELEPHONE ENCOUNTER
Cassandra with [address] called. Patient has had 8 pound weight gain in one day. He is already taking 2 lasix a day. His hands and feet are swollen. Please call Chiquita Mccray about this at [phone]. Thanks.

## 2020-08-24 NOTE — TELEPHONE ENCOUNTER
Sorry I was out, he has a standing order to increase lasix to BID for wt gain 3lb or more, did he do that? If so then increase to 60 bid and get BNP, BMP please.  Antonio Otto

## 2020-08-25 ENCOUNTER — OFFICE VISIT (OUTPATIENT)
Dept: CARDIOLOGY CLINIC | Age: 85
End: 2020-08-25
Payer: MEDICARE

## 2020-08-25 VITALS
HEART RATE: 80 BPM | BODY MASS INDEX: 23.63 KG/M2 | DIASTOLIC BLOOD PRESSURE: 66 MMHG | TEMPERATURE: 97.3 F | WEIGHT: 171.8 LBS | SYSTOLIC BLOOD PRESSURE: 110 MMHG

## 2020-08-25 PROCEDURE — 99214 OFFICE O/P EST MOD 30 MIN: CPT | Performed by: INTERNAL MEDICINE

## 2020-08-25 NOTE — PROGRESS NOTES
Cc: HFpEF, CAF    HPI:     Mr Kevin Sullivan is a 79 yo man with h/o HFpEF, CAF s/p unsuccessful AVJ ablation and Biotronik dual chamber PPM in 07/2011, follows with Dr. Rebel Barnes, CKD (BL cr 1.8 - 2.0). The RA lead appears to be dislodged and undersensing afib. Device was changed to VVI to minimize RV pacing (was pacing the RV 90%) in 02/2019.      Echo 2017: LVEF 50%, moderate MR, nl RV, mild valve disease, RVSP 42     Nicki 2011: normal.      Echo 06/2019: mild LVH, LVEF 50-55%, indeterminate diastolic due to afib, mild RVD with mild dysfunction, moderate TR     Nicki 06/2019: normal     Patient was admitted 7/1 - 7/5/20 at [facility] for AHF.      ECG 7/1/20: A. fib with controlled ventricular response, right bundle branch block     Patient is following up for a routine visit. He lives independently, his daughter occasionally helps him. He reports no symptoms. He sees Dr Laverne Townsend, nephrology. His weight had increased recently, hence lasix was raised from 40 daily to tid. His weight is now coming down. Histories     Past Medical History:   has a past medical history of Hypertension and SVT (supraventricular tachycardia) ([address] Surgical History:   has a past surgical history that includes Prostate surgery; sinus surgery; and Carpal tunnel release. Social History:   reports that he quit smoking about 25 years ago. His smoking use included pipe and cigars. He has never used smokeless tobacco. He reports current alcohol use. He reports that he does not use drugs. Family History:  No evidence for sudden cardiac death or premature CAD      Medications:     Home medications were reviewed and are listed below    Prior to Admission medications    Medication Sig Start Date End Date Taking?  Authorizing Provider   furosemide (LASIX) 40 MG tablet Take 1 tablet by mouth daily increase lasix to BID prn wt gain 3lb or more until back to baseline 7/31/20  Yes LASHELL Brown - CNP   apixaban (ELIQUIS) 2.5 MG TABS tablet Take 1 tablet by mouth 2 times daily 7/15/20  Yes Kash Florian MD   metoprolol succinate (TOPROL XL) 25 MG extended release tablet TAKE 1 TABLET EVERY DAY 7/8/20  Yes Kash Florian MD   timolol (TIMOPTIC-XE) 0.5 % ophthalmic gel-forming Place 1 drop into both eyes nightly    Yes Historical Provider, MD   finasteride (PROSCAR) 5 MG tablet Take 5 mg by mouth daily  3/5/17  Yes Historical Provider, MD   simvastatin (ZOCOR) 40 MG tablet TAKE 1 TABLET IN THE EVENING 1/5/17  Yes Historical Provider, MD          Allergy:     Patient has no known allergies. Review of Systems:     All 12 point review of symptoms completed. Pertinent positives identified in the HPI, all other review of symptoms negative as below. CONSTITUTIONAL: No fatigue  SKIN: No rash or pruritis. EYES: No visual changes or diplopia. No scleral icterus. ENT: No Headaches, hearing loss or vertigo. No mouth sores or sore throat. CARDIOVASCULAR: No chest pain/chest pressure/chest discomfort. No palpitations. No edema. RESPIRATORY: No dyspnea. No cough or wheezing, no sputum production. GASTROINTESTINAL: No N/V/D. No abdominal pain, appetite loss, blood in stools. GENITOURINARY: No dysuria, trouble voiding, or hematuria. MUSCULOSKELETAL:  No gait disturbance, weakness or joint complaints. NEUROLOGICAL: No headache, diplopia, change in muscle strength, numbness or tingling. No change in gait, balance, coordination, mood, affect, memory, mentation, behavior. PSHYCH: No anxiety, loss of interest, change in sexual behavior, feelings of self-harm, or confusion. ENDOCRINE: No excessive thirst, fluid intake, or urination. No tremor. HEMATOLOGIC: No abnormal bruising or bleeding. ALLERGY: No nasal congestion or hives.       Physical Examination:     Vitals:    08/25/20 1430   BP: 110/66   Pulse: 80   Temp: 97.3 °F (36.3 °C)   Weight: 171 lb 12.8 oz (77.9 kg)       Wt Readings from Last 3 Encounters:   08/25/20 171 lb 12.8 oz (77.9 kg)   07/08/20 173 lb 12.8 oz (78.8 kg)   07/05/20 169 lb 14.4 oz (77.1 kg)         General Appearance:  Alert, cooperative, no distress, appears stated age Appropriate weight   Head:  Normocephalic, without obvious abnormality, atraumatic   Eyes:  PERRL, conjunctiva/corneas clear EOM intact  Ears normal   Throat no lesions       Nose: Nares normal, no drainage or sinus tenderness   Throat: Lips, mucosa, and tongue normal   Neck: Supple, symmetrical, trachea midline, no adenopathy, thyroid: not enlarged, symmetric, no tenderness/mass/nodules, no carotid bruit       Lungs:   Clear to auscultation bilaterally, respirations unlabored   Chest Wall:  No tenderness or deformity   Heart:  Regular rhythm, rate is controlled, S1, S2 normal, there is no murmur, there is no rub or gallop, no jvd, 2+ bilateral lower extremity edema up to knees.     Abdomen:   Soft, non-tender, bowel sounds active all four quadrants,  no masses, no organomegaly       Extremities: Extremities normal, atraumatic, no cyanosis   Pulses: 2+ and symmetric   Skin: Skin color, texture, turgor normal, no rashes or lesions   Pysch: Normal mood and affect   Neurologic: Normal gross motor and sensory exam.  Cranial nerves intact        Labs:     Lab Results   Component Value Date    WBC 4.3 07/05/2020    HGB 9.8 (L) 07/05/2020    HCT 29.4 (L) 07/05/2020    MCV 92.4 07/05/2020     07/05/2020     Lab Results   Component Value Date     07/05/2020    K 3.8 07/05/2020     07/05/2020    CO2 24 07/05/2020    BUN 57 (H) 07/05/2020    CREATININE 2.6 (H) 07/05/2020    GLUCOSE 94 07/05/2020    CALCIUM 9.6 07/05/2020    PROT 7.0 07/01/2020    LABALBU 3.6 07/01/2020    BILITOT 0.8 07/01/2020    ALKPHOS 100 07/01/2020    AST 23 07/01/2020    ALT 12 07/01/2020    LABGLOM 23 (A) 07/05/2020    GFRAA 28 (A) 07/05/2020         Lab Results   Component Value Date    CHOL 88 07/02/2020     Lab Results   Component Value Date    TRIG 60 07/02/2020     Lab Results   Component Value Date    HDL 38 (L) 07/02/2020     Lab Results   Component Value Date    LDLCALC 38 07/02/2020     Lab Results   Component Value Date    LABVLDL 12 07/02/2020     No results found for: [facility]    Lab Results   Component Value Date    INR 1.1 06/18/2011    PROTIME 13.5 06/18/2011       The ASCVD Risk score (Mariah Shaffer., et al., 2013) failed to calculate for the following reasons: The 2013 ASCVD risk score is only valid for ages 36 to 78    The patient has a prior MI or stroke diagnosis      Assessment / Plan:      Diagnosis Orders   1. Chronic heart failure with preserved ejection fraction (HFpEF) ([facility])  Basic Metabolic Panel    Brain Natriuretic Peptide   2. Localized edema  Basic Metabolic Panel    Brain Natriuretic Peptide   3. Permanent atrial fibrillation     4. Essential hypertension          1.  Chronic HFpEF:  Patient is now euvolemic and hemodynamically stable.  His atrial fibrillation is rate controlled.     -C/w lasix 40 tid  - BMP and BNP now     2. CAF:   Per Dr Kong Ruiz     -Rate controlled with Toprol 25 daily  -On Eliquis renally dosed     3. S/p unsuccessful AVJ ablation and PPM implant:   Per Dr Kong Ruiz     4. HLP:   On a statin. Return in about 3 months (around 11/25/2020). I have spent 35 minutes of face to face time with the patient with more than 50% spent counseling and coordinating care. I have personally reviewed the reports and images of labs, radiological studies, cardiac studies including ECG's and telemetry, current and old medical records. The note was completed using EMR and Dragon dictation system. Every effort was made to ensure accuracy; however, inadvertent computerized transcription errors may be present. All questions and concerns were addressed to the patient/family. Alternatives to my treatment were discussed. I would like to thank you for providing me the opportunity to participate in the care of your patient.  If you have any questions,

## 2020-09-15 ENCOUNTER — TELEPHONE (OUTPATIENT)
Dept: CARDIOLOGY CLINIC | Age: 85
End: 2020-09-15

## 2020-09-15 NOTE — TELEPHONE ENCOUNTER
Vinh TEJEDA would like for the pt's weight to be monitored and if weight keeps going up more than a pound a day to call our office to report it. [facility] verbalized understanding and will be visiting the pt more frequently.

## 2020-09-15 NOTE — TELEPHONE ENCOUNTER
Nurse Galen called in to report pt 8 lb weight gain in 1 week. Galen can be reached at [phone] to address this matter.  Thanks

## 2020-10-12 ENCOUNTER — TELEPHONE (OUTPATIENT)
Dept: CARDIOLOGY CLINIC | Age: 85
End: 2020-10-12

## 2020-10-13 NOTE — TELEPHONE ENCOUNTER
Linda Sousa called concerned the pt has been taking lasix 40 mg tid when the pt should be taking lasix 40 mg daily and to take an extra 40 mg if pt has a weight gain of 3 lbs or more. Pt is a t baseline and doing well with taking the lasix 40 mg tid. Linda has a visit today and will get labs BMP, BNP. Will follow up with Linda after we receive lab results.

## 2020-10-16 NOTE — TELEPHONE ENCOUNTER
Nurse [facility] requesting to speak to nurse Arita regarding pt medication lasix. [facility] can be reached at [phone].  Thanks

## 2020-10-19 ENCOUNTER — TELEPHONE (OUTPATIENT)
Dept: CARDIOLOGY CLINIC | Age: 85
End: 2020-10-19

## 2020-10-19 NOTE — TELEPHONE ENCOUNTER
Nurse Season called in to report pt weight gain. Pt has gained 6 lbs in 3 days. Friday 16 th pt weighted 170 lbs Monday 19 th pt weighted 176 lbs. Season  Can be reached at [phone] to address this matter.  Thanks

## 2020-10-22 ENCOUNTER — TELEPHONE (OUTPATIENT)
Dept: CARDIOLOGY CLINIC | Age: 85
End: 2020-10-22

## 2020-10-22 NOTE — TELEPHONE ENCOUNTER
Season the home care nurse calling making sure Dr Isabel Venegas received lab results and patients weight  fluctuating between 173 and 176. Do you want any new orders. Please call Season # [phone] if needed.

## 2020-10-22 NOTE — TELEPHONE ENCOUNTER
Pt has no sx's. Pt currently taking lasix 40 mg bid since Monday 10/19. 10/16 pt's weight was 170 lbs and Monday 10/19 176 mg and KV wanted pt to take lasix 40 mg bid. Tuesday 10/20 - 173 lbs. Wednesday 10/21 176 lbs. and today 173 lbs.  Will consult with Nichol Morrison NP .

## 2020-10-23 NOTE — TELEPHONE ENCOUNTER
Informed Linda Gan NP reviewed pt's status and BMP from 10/22/20 she would like the pt to continue current therapy and continue to monitor weights and sx's, and call the office with any questions or concerns. Linda [address] [place] [address] verbalized understanding.

## 2020-11-04 NOTE — PROGRESS NOTES
[employer] remote transmission of pacemaker and/or ICD shows normal cardiac device function. No new arrhythmias. Battery status OK / TRINO- 25% remaining. Dual pacer programmed vvi d/t hx poor atrial sensing.  1%. No new arrhythmias. Follow up in 3 months via [employer] remote.

## 2020-11-05 ENCOUNTER — NURSE ONLY (OUTPATIENT)
Dept: CARDIOLOGY CLINIC | Age: 85
End: 2020-11-05
Payer: MEDICARE

## 2020-11-05 PROCEDURE — 93294 REM INTERROG EVL PM/LDLS PM: CPT | Performed by: INTERNAL MEDICINE

## 2020-11-05 PROCEDURE — 93296 REM INTERROG EVL PM/IDS: CPT | Performed by: INTERNAL MEDICINE

## 2020-11-05 NOTE — LETTER
[address] [phone] [address] [facility] [phone]    Pacemaker/Defibrillator Clinic          11/04/20        Cuca Conrad  [address]  [address] [facility] [address]        Dear Cuca Conrad    This letter is to inform you that we received the transmission from your monitor at home that checks your implanted heart device. The next date your monitor will automatically transmit will be 2/9/2021. If your report needs attention we will notify you. Your device and monitor are wireless and most transmit cellularly, but please periodically check your monitor is still plugged in to the electrical outlet. If you still use the telephone land line to send please ensure the connection to the phone marleny is secure. This will help to ensure successful automatic transmissions in the future. Also, the monitor needs to be close to you while sleeping at night. Please be aware that the remote device transmission sites are periodically monitored only during regular business hours during which simultaneous in-office device clinics are being run. If your transmission requires attention, we will contact you as soon as possible. Thank you.             [facility]

## 2020-11-13 ENCOUNTER — TELEPHONE (OUTPATIENT)
Dept: CARDIOLOGY CLINIC | Age: 85
End: 2020-11-13

## 2020-11-13 NOTE — TELEPHONE ENCOUNTER
Spoke with [facility] and clarified. PRN order is sufficient. All questions answered at this time.

## 2020-11-13 NOTE — TELEPHONE ENCOUNTER
Do you want to increase his lasix because his weight is fluctuating. There is a PRN order but dose the doctor want this to be a standing order.   Cristian [address] with [address] [place] [address]

## 2020-12-04 ENCOUNTER — OFFICE VISIT (OUTPATIENT)
Dept: CARDIOLOGY CLINIC | Age: 85
End: 2020-12-04
Payer: MEDICARE

## 2020-12-04 VITALS
HEART RATE: 72 BPM | SYSTOLIC BLOOD PRESSURE: 136 MMHG | DIASTOLIC BLOOD PRESSURE: 70 MMHG | WEIGHT: 176 LBS | BODY MASS INDEX: 24.2 KG/M2

## 2020-12-04 PROCEDURE — 99214 OFFICE O/P EST MOD 30 MIN: CPT | Performed by: INTERNAL MEDICINE

## 2020-12-04 RX ORDER — FUROSEMIDE 40 MG/1
40 TABLET ORAL 2 TIMES DAILY
Qty: 200 TABLET | Refills: 3 | Status: ON HOLD | OUTPATIENT
Start: 2020-12-04 | End: 2021-05-06 | Stop reason: HOSPADM

## 2020-12-04 NOTE — PROGRESS NOTES
Cc: HFpEF, CAF    HPI:     Mr Maureen Gamboa is a 79 yo man with h/o HFpEF, CAF s/p unsuccessful AVJ ablation and Biotronik dual chamber PPM in 07/2011, follows with Dr. Danilo Mariano, CKD (BL cr 1.8 - 2.0). The RA lead appears to be dislodged and undersensing afib. Device was changed to VVI to minimize RV pacing (was pacing the RV 90%) in 02/2019.      Echo 2017: LVEF 50%, moderate MR, nl RV, mild valve disease, RVSP 42     Nicki 2011: normal.      Echo 06/2019: mild LVH, LVEF 50-55%, indeterminate diastolic due to afib, mild RVD with mild dysfunction, moderate TR     Nicki 06/2019: normal     Patient was admitted 7/1 - 7/5/20 at [facility] for AHF.      ECG 7/1/20: A. fib with controlled ventricular response, right bundle branch block     Patient is following up for a routine visit. He lives independently, his daughter occasionally helps him. He reports no symptoms. He sees Dr Chad Joseph, nephrology. His weight is stable at 175 lbs, BP normal. Cr stable at 2.1 in 08/2020. Histories     Past Medical History:   has a past medical history of Hypertension and SVT (supraventricular tachycardia) ([address] Surgical History:   has a past surgical history that includes Prostate surgery; sinus surgery; and Carpal tunnel release. Social History:   reports that he quit smoking about 25 years ago. His smoking use included pipe and cigars. He has never used smokeless tobacco. He reports current alcohol use. He reports that he does not use drugs. Family History:  No evidence for sudden cardiac death or premature CAD      Medications:     Home medications were reviewed and are listed below    Prior to Admission medications    Medication Sig Start Date End Date Taking?  Authorizing Provider   furosemide (LASIX) 40 MG tablet Take 1 tablet by mouth 2 times daily 12/4/20  Yes Graciela Flores MD   apixaban David Sniff) 2.5 MG TABS tablet Take 1 tablet by mouth 2 times daily 7/15/20  Yes Graciela Flores MD   metoprolol succinate (TOPROL XL) 25 MG Appropriate weight   Head:  Normocephalic, without obvious abnormality, atraumatic   Eyes:  PERRL, conjunctiva/corneas clear EOM intact  Ears normal   Throat no lesions       Nose: Nares normal, no drainage or sinus tenderness   Throat: Lips, mucosa, and tongue normal   Neck: Supple, symmetrical, trachea midline, no adenopathy, thyroid: not enlarged, symmetric, no tenderness/mass/nodules, no carotid bruit       Lungs:   Clear to auscultation bilaterally, respirations unlabored   Chest Wall:  No tenderness or deformity   Heart:  Regular rhythm, rate is controlled, S1, S2 normal, there is no murmur, there is no rub or gallop, no jvd, 2+ bilateral lower extremity edema   Abdomen:   Soft, non-tender, bowel sounds active all four quadrants,  no masses, no organomegaly       Extremities: Extremities normal, atraumatic, no cyanosis   Pulses: 2+ and symmetric   Skin: Skin color, texture, turgor normal, no rashes or lesions   Pysch: Normal mood and affect   Neurologic: Normal gross motor and sensory exam.  Cranial nerves intact        Labs:     Lab Results   Component Value Date    WBC 4.3 07/05/2020    HGB 9.8 (L) 07/05/2020    HCT 29.4 (L) 07/05/2020    MCV 92.4 07/05/2020     07/05/2020     Lab Results   Component Value Date     (L) 08/25/2020    K 4.0 08/25/2020    CL 99 08/25/2020    CO2 22 08/25/2020    BUN 48 (H) 08/25/2020    CREATININE 2.1 (H) 08/25/2020    GLUCOSE 91 08/25/2020    CALCIUM 9.7 08/25/2020    PROT 7.0 07/01/2020    LABALBU 3.6 07/01/2020    BILITOT 0.8 07/01/2020    ALKPHOS 100 07/01/2020    AST 23 07/01/2020    ALT 12 07/01/2020    LABGLOM 29 (A) 08/25/2020    GFRAA 35 (A) 08/25/2020         Lab Results   Component Value Date    CHOL 88 07/02/2020     Lab Results   Component Value Date    TRIG 60 07/02/2020     Lab Results   Component Value Date    HDL 38 (L) 07/02/2020     Lab Results   Component Value Date    LDLCALC 38 07/02/2020     Lab Results   Component Value Date    LABVLDL 12 07/02/2020     No results found for: [facility]    Lab Results   Component Value Date    INR 1.1 06/18/2011    PROTIME 13.5 06/18/2011       The ASCVD Risk score (Marisela Sam., et al., 2013) failed to calculate for the following reasons: The 2013 ASCVD risk score is only valid for ages 36 to 78    The patient has a prior MI or stroke diagnosis      Assessment / Plan:      Diagnosis Orders   1. Permanent atrial fibrillation     2. Chronic heart failure with preserved ejection fraction (HFpEF) ([address]     3. Essential hypertension     4. Localized edema     5. Cardiac pacemaker in situ          1.  Chronic HFpEF:  Patient is now euvolemic and hemodynamically stable.  His atrial fibrillation is rate controlled.     -C/w lasix 40 bid; if edema or weight up, increase to tid.      2. CAF:   Per Dr Farhad Medeiros     -Rate controlled with Toprol 25 daily  -On Eliquis renally dosed     3. S/p unsuccessful AVJ ablation and PPM implant:   Per Dr Farhad Medeiros     4. HLP:   On a statin.         Return in about 3 months (around 3/4/2021). I have spent 30 minutes of face to face time with the patient with more than 50% spent counseling and coordinating care. I have personally reviewed the reports and images of labs, radiological studies, cardiac studies including ECG's and telemetry, current and old medical records. The note was completed using EMR and Dragon dictation system. Every effort was made to ensure accuracy; however, inadvertent computerized transcription errors may be present. All questions and concerns were addressed to the patient/family. Alternatives to my treatment were discussed. I would like to thank you for providing me the opportunity to participate in the care of your patient. If you have any questions, please do not hesitate to contact me.      Mira Weiss MD, [facility] - [place] [address] [address] [address]  Main Office Phone: [phone]  Heart Failure Hotline: [phone]  Fax: [phone]

## 2021-01-05 RX ORDER — APIXABAN 2.5 MG/1
TABLET, FILM COATED ORAL
Qty: 60 TABLET | Refills: 2 | Status: SHIPPED | OUTPATIENT
Start: 2021-01-05 | End: 2021-04-01

## 2021-02-09 ENCOUNTER — NURSE ONLY (OUTPATIENT)
Dept: CARDIOLOGY CLINIC | Age: 86
End: 2021-02-09
Payer: MEDICARE

## 2021-02-09 DIAGNOSIS — R00.1 BRADYCARDIA: ICD-10-CM

## 2021-02-09 DIAGNOSIS — Z95.0 CARDIAC PACEMAKER IN SITU: ICD-10-CM

## 2021-02-09 PROCEDURE — 93294 REM INTERROG EVL PM/LDLS PM: CPT | Performed by: INTERNAL MEDICINE

## 2021-02-09 PROCEDURE — 93296 REM INTERROG EVL PM/IDS: CPT | Performed by: INTERNAL MEDICINE

## 2021-02-09 NOTE — LETTER
[address] [phone]  [address] [facility] [address] [facility] [phone]    Pacemaker/Defibrillator Clinic          02/09/21        Cuca Conrad  [address] [facility] [facility] [address]  [address] [facility] [address]        Dear Cuca Conrad    This letter is to inform you that we received the transmission from your monitor at home that checks your implanted heart device. The next date your monitor will automatically transmit will be 5/11. If your report needs attention we will notify you. Your device and monitor are wireless and most transmit cellularly, but please periodically check your monitor is still plugged in to the electrical outlet. If you still use the telephone land line to send please ensure the connection to the phone marleny is secure. This will help to ensure successful automatic transmissions in the future. Also, the monitor needs to be close to you while sleeping at night. Please be aware that the remote device transmission sites are periodically monitored only during regular business hours during which simultaneous in-office device clinics are being run. If your transmission requires attention, we will contact you as soon as possible. Thank you.             [facility]

## 2021-02-09 NOTE — PROGRESS NOTES
BATTERY STATUS OK-20% REMAINING UNTIL TRINO. [employer] remote transmission of pacemaker and/or ICD shows normal cardiac device function. No  arrhythmias recorded. Follow up in 3 months via [employer] remote.

## 2021-03-04 NOTE — PROGRESS NOTES
[facility], [place]   Congestive Heart Failure    PrimaryCare Doctor: James Oliver MD      Chief Complaint:  CHF    History of Present Illness:  Belen Calhoun is a 80 y.o. male with PMH HFpEF, AF, PPM, CKD who presents today for CHF f/u. At the last OV 3months ago, he c/o nothing and no med changes  Today he c/o mild dyspnea, palpitations, edema and occasional dizziness all that are stable for him  He denies chest pain, orthopnea, PND, exertional chest pressure/discomfort, early saiety, syncope. ER Visit: No  Recent Hospitalization: No    Baseline Weight: 170  Wt Readings from Last 3 Encounters:   12/04/20 176 lb (79.8 kg)   08/25/20 171 lb 12.8 oz (77.9 kg)   07/08/20 173 lb 12.8 oz (78.8 kg)          EF: 50-55%  Cardiac Imaging:  Echo 06/2019: mild LVH, LVEF 50-55%, indeterminate diastolic due to afib, mild RVD with mild dysfunction, moderate TR    Device: SharePlow dual chamber pacer 2011      Activity: baseline  Can you walk 1-2 blocks or do a moderate amount of house/yard work? No      NYHA Class: II       Pt Education: The patient has received education on the following topics: dietary sodium restriction, heart failure medications, the importance of physical activity, symptom management and weight monitoring      Past Medical History:   has a past medical history of Hypertension and SVT (supraventricular tachycardia) ([facility] [address] Surgical History:   has a past surgical history that includes Prostate surgery; sinus surgery; and Carpal tunnel release. Social History:   reports that he quit smoking about 25 years ago. His smoking use included pipe and cigars. He has never used smokeless tobacco. He reports current alcohol use. He reports that he does not use drugs. Family History:   Family History   Problem Relation Age of Onset    Kidney Disease Sister     Heart Disease Sister        HomeMedications:  Prior to Admission medications    Medication Sig Start Date End Date Taking?  Authorizing Provider is warm. Neurological:      General: No focal deficit present. Mental Status: He is alert and oriented to person, place, and time. Mental status is at baseline. Psychiatric:         Mood and Affect: Mood normal.         Behavior: Behavior normal.         Thought Content: Thought content normal.         Judgment: Judgment normal.         Lab Data:    CBC:   Lab Results   Component Value Date    WBC 4.3 07/05/2020    WBC 3.8 07/01/2020    WBC 4.7 04/10/2012    RBC 3.18 07/05/2020    RBC 3.31 07/01/2020    RBC 3.84 04/10/2012    HGB 9.8 07/05/2020    HGB 10.2 07/01/2020    HGB 11.2 10/31/2018    HCT 29.4 07/05/2020    HCT 31.4 07/01/2020    HCT 31.0 07/01/2020    MCV 92.4 07/05/2020    MCV 93.5 07/01/2020    MCV 89.0 04/10/2012    RDW 15.8 07/05/2020    RDW 16.4 07/01/2020    RDW 16.6 04/10/2012     07/05/2020     07/01/2020     04/10/2012     BMP:  Lab Results   Component Value Date     08/25/2020     07/05/2020     07/04/2020    K 4.0 08/25/2020    K 3.8 07/05/2020    K 3.9 07/04/2020    K 4.6 07/01/2020    CL 99 08/25/2020     07/05/2020     07/04/2020    CO2 22 08/25/2020    CO2 24 07/05/2020    CO2 24 07/04/2020    PHOS 3.7 11/05/2019    PHOS 3.4 10/31/2018    PHOS 3.6 06/21/2011    BUN 48 08/25/2020    BUN 57 07/05/2020    BUN 52 07/04/2020    CREATININE 2.1 08/25/2020    CREATININE 2.6 07/05/2020    CREATININE 2.4 07/04/2020     BNP:   Lab Results   Component Value Date    PROBNP 4,375 08/25/2020    PROBNP 6,386 07/04/2020    PROBNP 5,677 07/01/2020         Assessment/Plan:    1. Chronic heart failure with preserved ejection fraction (HFpEF) ([facility] [address] - compensated, will have [place] get labs   2. Permanent atrial fibrillation - rate controlled on BB, continue AC, EP f/u in May   3. Essential hypertension - controlled   4. Localized edema - stable       Instructions:   1. Medications: continue current medications  2. Labs: per home care   3.  Follow up: may with [facility] Heart Failure Hotline: [phone]         I appreciate the opportunity of cooperating in the care of this individual.    Colin Corona CNP, 3/4/2021,3:07 PM

## 2021-03-05 ENCOUNTER — OFFICE VISIT (OUTPATIENT)
Dept: CARDIOLOGY CLINIC | Age: 86
End: 2021-03-05
Payer: MEDICARE

## 2021-03-05 VITALS
WEIGHT: 171.2 LBS | DIASTOLIC BLOOD PRESSURE: 62 MMHG | SYSTOLIC BLOOD PRESSURE: 124 MMHG | TEMPERATURE: 97.3 F | BODY MASS INDEX: 23.97 KG/M2 | HEIGHT: 71 IN

## 2021-03-05 DIAGNOSIS — I10 ESSENTIAL HYPERTENSION: ICD-10-CM

## 2021-03-05 DIAGNOSIS — I50.32 CHRONIC HEART FAILURE WITH PRESERVED EJECTION FRACTION (HFPEF) (HCC): Primary | ICD-10-CM

## 2021-03-05 DIAGNOSIS — R60.0 LOCALIZED EDEMA: ICD-10-CM

## 2021-03-05 DIAGNOSIS — I48.21 PERMANENT ATRIAL FIBRILLATION (HCC): ICD-10-CM

## 2021-03-05 PROCEDURE — 99214 OFFICE O/P EST MOD 30 MIN: CPT | Performed by: NURSE PRACTITIONER

## 2021-03-05 ASSESSMENT — ENCOUNTER SYMPTOMS
SHORTNESS OF BREATH: 1
GASTROINTESTINAL NEGATIVE: 1

## 2021-03-17 ENCOUNTER — OFFICE VISIT (OUTPATIENT)
Dept: ENT CLINIC | Age: 86
End: 2021-03-17
Payer: MEDICARE

## 2021-03-17 VITALS
SYSTOLIC BLOOD PRESSURE: 136 MMHG | HEIGHT: 71 IN | TEMPERATURE: 97.3 F | WEIGHT: 169 LBS | HEART RATE: 72 BPM | BODY MASS INDEX: 23.66 KG/M2 | DIASTOLIC BLOOD PRESSURE: 72 MMHG

## 2021-03-17 DIAGNOSIS — H61.23 BILATERAL IMPACTED CERUMEN: Primary | ICD-10-CM

## 2021-03-17 DIAGNOSIS — R42 DIZZINESS: ICD-10-CM

## 2021-03-17 PROCEDURE — 99204 OFFICE O/P NEW MOD 45 MIN: CPT | Performed by: OTOLARYNGOLOGY

## 2021-03-17 PROCEDURE — 69210 REMOVE IMPACTED EAR WAX UNI: CPT | Performed by: OTOLARYNGOLOGY

## 2021-03-17 ASSESSMENT — ENCOUNTER SYMPTOMS
SINUS PRESSURE: 0
DIARRHEA: 0
EYE ITCHING: 0
FACIAL SWELLING: 0
STRIDOR: 0
VOICE CHANGE: 0
WHEEZING: 0
PHOTOPHOBIA: 0
VOMITING: 0
COLOR CHANGE: 0
BACK PAIN: 0
EYE DISCHARGE: 0
SINUS PAIN: 0
NAUSEA: 0
COUGH: 0
RHINORRHEA: 0
SORE THROAT: 0
TROUBLE SWALLOWING: 0
BLOOD IN STOOL: 0
CHOKING: 0
CONSTIPATION: 0
SHORTNESS OF BREATH: 0

## 2021-03-17 NOTE — PROGRESS NOTES
[facility]  [facility] [address], [address]  P: [phone]  F: [phone]       Patient     Fernie Patino  11/20/1921    ChiefComplaint     Chief Complaint   Patient presents with    New Patient     Patient is here today because he has been having episodes of dizziness in the mornings and thinks his ear shold be cleaned out also the left ear he say it feels like air gets into the ear       History of Present Illness     Fernie Patino is a pleasant 80 y.o. male who presents as a new patient today for bilateral cerumen impaction. Patient has noticed feels ears are slightly muffled. Additionally, over the past year or 2, he has been experiencing some mild dizzy symptoms as he wakes up in the morning. Lasts for few minutes and then goes away. He has not taken any medications for this. He denies any inciting aggravating or alleviating factors. He describes the dizziness as a sensation of motion. He denies any associated symptoms such as otalgia, otorrhea, aural fullness, hearing loss, tinnitus. Denies a history of ear infections or ear surgeries. He does not wear hearing aids. He has never had his hearing tested. He feels that his hearing is relatively stable overall. Patient ambulates with a walker.     Past Medical History     Past Medical History:   Diagnosis Date    Hypertension     SVT (supraventricular tachycardia) (HCC)        Past Surgical History     Past Surgical History:   Procedure Laterality Date    CARPAL TUNNEL RELEASE      PROSTATE SURGERY      SINUS SURGERY         Family History     Family History   Problem Relation Age of Onset    Kidney Disease Sister     Heart Disease Sister        Social History     Social History     Socioeconomic History    Marital status:      Spouse name: Not on file    Number of children: Not on file    Years of education: Not on file    Highest education level: Not on file   Occupational History  Not on file   Social Needs    Financial resource strain: Not on file    Food insecurity     Worry: Not on file     Inability: Not on file    Transportation needs     Medical: Not on file     Non-medical: Not on file   Tobacco Use    Smoking status: Former Smoker     Types: Pipe, Cigars     Quit date: 1995     Years since quittin.9    Smokeless tobacco: Never Used   Substance and Sexual Activity    Alcohol use: Yes     Alcohol/week: 0.0 standard drinks     Comment: rarely    Drug use: No    Sexual activity: Not on file   Lifestyle    Physical activity     Days per week: Not on file     Minutes per session: Not on file    Stress: Not on file   Relationships    Social connections     Talks on phone: Not on file     Gets together: Not on file     Attends Jehovah's witness service: Not on file     Active member of club or organization: Not on file     Attends meetings of clubs or organizations: Not on file     Relationship status: Not on file    Intimate partner violence     Fear of current or ex partner: Not on file     Emotionally abused: Not on file     Physically abused: Not on file     Forced sexual activity: Not on file   Other Topics Concern    Not on file   Social History Narrative    Not on file       Allergies     No Known Allergies    Medications     Current Outpatient Medications   Medication Sig Dispense Refill    ELIQUIS 2.5 MG TABS tablet TAKE 1 TABLET BY MOUTH TWICE A DAY 60 tablet 2    furosemide (LASIX) 40 MG tablet Take 1 tablet by mouth 2 times daily 200 tablet 3    metoprolol succinate (TOPROL XL) 25 MG extended release tablet TAKE 1 TABLET EVERY DAY 90 tablet 3    timolol (TIMOPTIC-XE) 0.5 % ophthalmic gel-forming Place 1 drop into both eyes nightly       finasteride (PROSCAR) 5 MG tablet Take 5 mg by mouth daily       simvastatin (ZOCOR) 40 MG tablet TAKE 1 TABLET IN THE EVENING  2     No current facility-administered medications for this visit.         Review of Systems Review of Systems   Constitutional: Negative for activity change, appetite change, chills, diaphoresis, fatigue, fever and unexpected weight change. HENT: Negative for congestion, dental problem, drooling, ear discharge, ear pain, facial swelling, hearing loss, mouth sores, nosebleeds, postnasal drip, rhinorrhea, sinus pressure, sinus pain, sneezing, sore throat, tinnitus, trouble swallowing and voice change. Eyes: Negative for photophobia, discharge, itching and visual disturbance. Respiratory: Negative for cough, choking, shortness of breath, wheezing and stridor. Gastrointestinal: Negative for blood in stool, constipation, diarrhea, nausea and vomiting. Endocrine: Negative for cold intolerance, heat intolerance, polyphagia and polyuria. Musculoskeletal: Negative for back pain, gait problem, neck pain and neck stiffness. Skin: Negative for color change, pallor, rash and wound. Neurological: Positive for dizziness. Negative for syncope, facial asymmetry, speech difficulty, light-headedness, numbness and headaches. Hematological: Negative for adenopathy. Does not bruise/bleed easily. Psychiatric/Behavioral: Negative for agitation, confusion and sleep disturbance. PhysicalExam     Vitals:    03/17/21 1338   BP: 136/72   Pulse: 72   Temp: 97.3 °F (36.3 °C)       Physical Exam  Constitutional:       Appearance: He is well-developed. HENT:      Head: Normocephalic and atraumatic. Not macrocephalic and not microcephalic. No raccoon eyes, Mcfarlane's sign, abrasion, contusion, right periorbital erythema, left periorbital erythema or laceration. Hair is normal.      Jaw: No trismus. Right Ear: Hearing, tympanic membrane and external ear normal. No decreased hearing noted. No drainage, swelling or tenderness. No middle ear effusion. No mastoid tenderness. Tympanic membrane is not perforated, retracted or bulging. Tympanic membrane has normal mobility.       Left Ear: Hearing, tympanic membrane and external ear normal. No decreased hearing noted. No drainage, swelling or tenderness. No middle ear effusion. No mastoid tenderness. Tympanic membrane is not perforated, retracted or bulging. Tympanic membrane has normal mobility. Nose: No nasal deformity, septal deviation, laceration, mucosal edema or rhinorrhea. Right Sinus: No maxillary sinus tenderness or frontal sinus tenderness. Left Sinus: No maxillary sinus tenderness or frontal sinus tenderness. Mouth/Throat:      Mouth: Mucous membranes are not pale, not dry and not cyanotic. No lacerations or oral lesions. Dentition: Normal dentition. No dental caries or dental abscesses. Pharynx: Uvula midline. No oropharyngeal exudate, posterior oropharyngeal erythema or uvula swelling. Tonsils: No tonsillar abscesses. Eyes:      General: Lids are normal.         Right eye: No discharge. Left eye: No discharge. Extraocular Movements:      Right eye: Normal extraocular motion and no nystagmus. Left eye: Normal extraocular motion and no nystagmus. Conjunctiva/sclera:      Right eye: No chemosis or exudate. Left eye: No chemosis or exudate. Neck:      Musculoskeletal: Neck supple. Thyroid: No thyroid mass or thyromegaly. Vascular: Normal carotid pulses. Trachea: No tracheal tenderness or tracheal deviation. Cardiovascular:      Rate and Rhythm: Normal rate and regular rhythm. Pulmonary:      Effort: No tachypnea, bradypnea or respiratory distress. Breath sounds: No stridor. Musculoskeletal:      Right shoulder: He exhibits normal range of motion. Lymphadenopathy:      Head:      Right side of head: No submental, submandibular, tonsillar, preauricular, posterior auricular or occipital adenopathy. Left side of head: No submental, submandibular, tonsillar, preauricular, posterior auricular or occipital adenopathy. Cervical: No cervical adenopathy.       Right cervical: No superficial, deep or posterior cervical adenopathy. Left cervical: No superficial, deep or posterior cervical adenopathy. Skin:     General: Skin is warm and dry. Findings: No bruising, erythema, laceration, lesion or rash. Neurological:      Mental Status: He is alert and oriented to person, place, and time. Cranial Nerves: No cranial nerve deficit. Comments: Functional neuro exam performed. Cranial nerves II to XII grossly intact. Hinton-Hallpike negative bilaterally. Romberg positive. Unable to [place]. Psychiatric:         Speech: Speech normal.         Behavior: Behavior normal.           Procedure     Removal Impacted Cerumen    An operating microscope was utilized to visualize the external auditory canals using a 4mm speculum. Cerumen was removed with curettes and stuart suctions on both sides. The tympanic membrane is intact. No fluid visualized in the middle ear. No complications. Assessment and Plan     1. Bilateral impacted cerumen  Patient had bilateral cerumen impaction. Cerumen removed with instruments. Patient reports improvement of symptoms. 2. Dizziness  Patient has been experiencing some mild dizzy sensation for the past year or so when he wakes up in the morning. It is very brief and short-lived and not interfering with his activities of daily living. He does not cause any falls. Denies any change in hearing. Testing today is negative, however patient does have a positive Romberg, some general muscle weakness and ambulates with a walker. I suspect he is experiencing some presbystasis. I offer the patient physical therapy. He does have therapist to come to his home twice a week. I encouraged him to discuss this with them and have them work with him on improving his balance. If symptoms persist or worsen he should call. Return if symptoms worsen or fail to improve. Portions of this note were dictated using Dragon.  There may be linguistic errors secondary to the use of this program.

## 2021-04-04 ENCOUNTER — APPOINTMENT (OUTPATIENT)
Dept: CT IMAGING | Age: 86
DRG: 552 | End: 2021-04-04
Payer: MEDICARE

## 2021-04-04 ENCOUNTER — HOSPITAL ENCOUNTER (INPATIENT)
Age: 86
LOS: 1 days | Discharge: NURSING, REHAB OR LONG-TERM CARE | DRG: 552 | End: 2021-04-05
Attending: EMERGENCY MEDICINE | Admitting: INTERNAL MEDICINE
Payer: MEDICARE

## 2021-04-04 DIAGNOSIS — S32.049A CLOSED FRACTURE OF FOURTH LUMBAR VERTEBRA, UNSPECIFIED FRACTURE MORPHOLOGY, INITIAL ENCOUNTER (HCC): ICD-10-CM

## 2021-04-04 DIAGNOSIS — S32.019A CLOSED FRACTURE OF FIRST LUMBAR VERTEBRA, UNSPECIFIED FRACTURE MORPHOLOGY, INITIAL ENCOUNTER (HCC): ICD-10-CM

## 2021-04-04 DIAGNOSIS — M54.50 ACUTE BILATERAL LOW BACK PAIN WITHOUT SCIATICA: Primary | ICD-10-CM

## 2021-04-04 PROBLEM — S32.010A CLOSED COMPRESSION FRACTURE OF BODY OF L1 VERTEBRA (HCC): Status: ACTIVE | Noted: 2021-04-04

## 2021-04-04 LAB
A/G RATIO: 0.8 (ref 1.1–2.2)
ALBUMIN SERPL-MCNC: 3.6 G/DL (ref 3.4–5)
ALP BLD-CCNC: 141 U/L (ref 40–129)
ALT SERPL-CCNC: 14 U/L (ref 10–40)
ANION GAP SERPL CALCULATED.3IONS-SCNC: 14 MMOL/L (ref 3–16)
AST SERPL-CCNC: 24 U/L (ref 15–37)
BASOPHILS ABSOLUTE: 0 K/UL (ref 0–0.2)
BASOPHILS RELATIVE PERCENT: 0.8 %
BILIRUB SERPL-MCNC: 1.1 MG/DL (ref 0–1)
BILIRUBIN URINE: NEGATIVE
BLOOD, URINE: ABNORMAL
BUN BLDV-MCNC: 56 MG/DL (ref 7–20)
CALCIUM SERPL-MCNC: 9.9 MG/DL (ref 8.3–10.6)
CHLORIDE BLD-SCNC: 100 MMOL/L (ref 99–110)
CLARITY: CLEAR
CO2: 22 MMOL/L (ref 21–32)
COLOR: YELLOW
CREAT SERPL-MCNC: 2.5 MG/DL (ref 0.8–1.3)
EOSINOPHILS ABSOLUTE: 0 K/UL (ref 0–0.6)
EOSINOPHILS RELATIVE PERCENT: 0.7 %
EPITHELIAL CELLS, UA: NORMAL /HPF (ref 0–5)
GFR AFRICAN AMERICAN: 29
GFR NON-AFRICAN AMERICAN: 24
GLOBULIN: 4.4 G/DL
GLUCOSE BLD-MCNC: 108 MG/DL (ref 70–99)
GLUCOSE URINE: NEGATIVE MG/DL
HCT VFR BLD CALC: 30.2 % (ref 40.5–52.5)
HEMOGLOBIN: 10.2 G/DL (ref 13.5–17.5)
KETONES, URINE: NEGATIVE MG/DL
LEUKOCYTE ESTERASE, URINE: NEGATIVE
LYMPHOCYTES ABSOLUTE: 0.3 K/UL (ref 1–5.1)
LYMPHOCYTES RELATIVE PERCENT: 5.3 %
MCH RBC QN AUTO: 30.7 PG (ref 26–34)
MCHC RBC AUTO-ENTMCNC: 33.9 G/DL (ref 31–36)
MCV RBC AUTO: 90.5 FL (ref 80–100)
MICROSCOPIC EXAMINATION: YES
MONOCYTES ABSOLUTE: 0.9 K/UL (ref 0–1.3)
MONOCYTES RELATIVE PERCENT: 15.3 %
NEUTROPHILS ABSOLUTE: 4.6 K/UL (ref 1.7–7.7)
NEUTROPHILS RELATIVE PERCENT: 77.9 %
NITRITE, URINE: NEGATIVE
PDW BLD-RTO: 15.5 % (ref 12.4–15.4)
PH UA: 7 (ref 5–8)
PLATELET # BLD: 202 K/UL (ref 135–450)
PMV BLD AUTO: 8.5 FL (ref 5–10.5)
POTASSIUM REFLEX MAGNESIUM: 3.7 MMOL/L (ref 3.5–5.1)
PROTEIN UA: NEGATIVE MG/DL
RBC # BLD: 3.34 M/UL (ref 4.2–5.9)
RBC UA: NORMAL /HPF (ref 0–4)
SODIUM BLD-SCNC: 136 MMOL/L (ref 136–145)
SPECIFIC GRAVITY UA: 1.02 (ref 1–1.03)
TOTAL PROTEIN: 8 G/DL (ref 6.4–8.2)
URINE TYPE: ABNORMAL
UROBILINOGEN, URINE: 0.2 E.U./DL
WBC # BLD: 5.9 K/UL (ref 4–11)
WBC UA: NORMAL /HPF (ref 0–5)

## 2021-04-04 PROCEDURE — G0378 HOSPITAL OBSERVATION PER HR: HCPCS

## 2021-04-04 PROCEDURE — 80053 COMPREHEN METABOLIC PANEL: CPT

## 2021-04-04 PROCEDURE — 85025 COMPLETE CBC W/AUTO DIFF WBC: CPT

## 2021-04-04 PROCEDURE — 1200000000 HC SEMI PRIVATE

## 2021-04-04 PROCEDURE — 99284 EMERGENCY DEPT VISIT MOD MDM: CPT

## 2021-04-04 PROCEDURE — 81001 URINALYSIS AUTO W/SCOPE: CPT

## 2021-04-04 PROCEDURE — 2580000003 HC RX 258: Performed by: INTERNAL MEDICINE

## 2021-04-04 PROCEDURE — 74176 CT ABD & PELVIS W/O CONTRAST: CPT

## 2021-04-04 PROCEDURE — 76376 3D RENDER W/INTRP POSTPROCES: CPT

## 2021-04-04 PROCEDURE — 6370000000 HC RX 637 (ALT 250 FOR IP): Performed by: STUDENT IN AN ORGANIZED HEALTH CARE EDUCATION/TRAINING PROGRAM

## 2021-04-04 RX ORDER — ACETAMINOPHEN 650 MG/1
650 SUPPOSITORY RECTAL EVERY 6 HOURS PRN
Status: DISCONTINUED | OUTPATIENT
Start: 2021-04-04 | End: 2021-04-05 | Stop reason: HOSPADM

## 2021-04-04 RX ORDER — POLYETHYLENE GLYCOL 3350 17 G/17G
17 POWDER, FOR SOLUTION ORAL DAILY PRN
Status: DISCONTINUED | OUTPATIENT
Start: 2021-04-04 | End: 2021-04-05 | Stop reason: HOSPADM

## 2021-04-04 RX ORDER — ACETAMINOPHEN 325 MG/1
650 TABLET ORAL EVERY 6 HOURS PRN
Status: DISCONTINUED | OUTPATIENT
Start: 2021-04-04 | End: 2021-04-05 | Stop reason: HOSPADM

## 2021-04-04 RX ORDER — ONDANSETRON 2 MG/ML
4 INJECTION INTRAMUSCULAR; INTRAVENOUS EVERY 6 HOURS PRN
Status: DISCONTINUED | OUTPATIENT
Start: 2021-04-04 | End: 2021-04-05 | Stop reason: HOSPADM

## 2021-04-04 RX ORDER — SODIUM CHLORIDE 9 MG/ML
INJECTION, SOLUTION INTRAVENOUS CONTINUOUS
Status: DISCONTINUED | OUTPATIENT
Start: 2021-04-04 | End: 2021-04-05

## 2021-04-04 RX ORDER — PROMETHAZINE HYDROCHLORIDE 25 MG/1
12.5 TABLET ORAL EVERY 6 HOURS PRN
Status: DISCONTINUED | OUTPATIENT
Start: 2021-04-04 | End: 2021-04-05 | Stop reason: HOSPADM

## 2021-04-04 RX ORDER — SODIUM CHLORIDE 0.9 % (FLUSH) 0.9 %
10 SYRINGE (ML) INJECTION EVERY 12 HOURS SCHEDULED
Status: DISCONTINUED | OUTPATIENT
Start: 2021-04-04 | End: 2021-04-05 | Stop reason: HOSPADM

## 2021-04-04 RX ORDER — TIMOLOL MALEATE 5 MG/ML
1 SOLUTION/ DROPS OPHTHALMIC NIGHTLY
Status: DISCONTINUED | OUTPATIENT
Start: 2021-04-04 | End: 2021-04-05 | Stop reason: HOSPADM

## 2021-04-04 RX ORDER — SODIUM CHLORIDE 0.9 % (FLUSH) 0.9 %
10 SYRINGE (ML) INJECTION PRN
Status: DISCONTINUED | OUTPATIENT
Start: 2021-04-04 | End: 2021-04-05 | Stop reason: HOSPADM

## 2021-04-04 RX ORDER — ATORVASTATIN CALCIUM 20 MG/1
20 TABLET, FILM COATED ORAL DAILY
Status: DISCONTINUED | OUTPATIENT
Start: 2021-04-05 | End: 2021-04-05 | Stop reason: HOSPADM

## 2021-04-04 RX ORDER — SODIUM CHLORIDE 9 MG/ML
25 INJECTION, SOLUTION INTRAVENOUS PRN
Status: DISCONTINUED | OUTPATIENT
Start: 2021-04-04 | End: 2021-04-05 | Stop reason: HOSPADM

## 2021-04-04 RX ORDER — FINASTERIDE 5 MG/1
5 TABLET, FILM COATED ORAL DAILY
Status: DISCONTINUED | OUTPATIENT
Start: 2021-04-05 | End: 2021-04-05 | Stop reason: HOSPADM

## 2021-04-04 RX ORDER — METOPROLOL SUCCINATE 25 MG/1
25 TABLET, EXTENDED RELEASE ORAL DAILY
Status: DISCONTINUED | OUTPATIENT
Start: 2021-04-05 | End: 2021-04-05 | Stop reason: HOSPADM

## 2021-04-04 RX ORDER — LIDOCAINE 50 MG/G
1 PATCH TOPICAL DAILY
Qty: 10 PATCH | Refills: 0 | Status: SHIPPED | OUTPATIENT
Start: 2021-04-04 | End: 2021-04-14

## 2021-04-04 RX ORDER — LIDOCAINE 4 G/G
1 PATCH TOPICAL DAILY
Status: DISCONTINUED | OUTPATIENT
Start: 2021-04-04 | End: 2021-04-05 | Stop reason: HOSPADM

## 2021-04-04 RX ADMIN — SODIUM CHLORIDE: 9 INJECTION, SOLUTION INTRAVENOUS at 23:52

## 2021-04-04 RX ADMIN — Medication 10 ML: at 23:54

## 2021-04-04 ASSESSMENT — PAIN DESCRIPTION - DESCRIPTORS: DESCRIPTORS: ACHING

## 2021-04-04 ASSESSMENT — PAIN DESCRIPTION - FREQUENCY: FREQUENCY: CONTINUOUS

## 2021-04-04 ASSESSMENT — PAIN SCALES - GENERAL
PAINLEVEL_OUTOF10: 4
PAINLEVEL_OUTOF10: 7

## 2021-04-04 NOTE — ED NOTES
Bed: B20-20  Expected date:   Expected time:   Means of arrival:   Comments:  Merlene Thorpe RN  04/04/21 [id number]

## 2021-04-04 NOTE — ED PROVIDER NOTES
ED Attending Attestation Note     Date of evaluation: 4/4/2021    This patient was seen by the resident, Dr. Kim Gilmore. I have seen and examined the patient, agree with the workup, evaluation, management and diagnosis. The care plan has been discussed. This is a pleasant 51-year-old male with a past medical history of hypertension, chronic kidney disease, atrial fibrillation status post pacemaker presents today for evaluation of back pain. Patient denies any falls. He states that he is is on Eliquis. He denies any bowel or bladder incontinence. He denies any blood in the stools or any hematuria or any history of kidney stones. He describes that he has not had pain like this before. He rates the pain currently as a 7 out of 10. He states he took ibuprofen and Tylenol without improvement of his pain. At baseline, he walks with a cane but states that is been difficult to walk given the pain in his back. The pain seems to come and go but it is also constant. On exam, the patient is well-appearing. He is in no acute distress. His smile symmetric. He has no cervical midline tenderness. He has 5 out of 5 strength in the upper lower extremity. He has some tenderness to palpation on the lumbar spine. He has no CVA tenderness. His belly is soft without pulsatile mass. Normoactive bowel sounds. 2+ radial pulses are noted. Sensation remains intact in lower extremities, and he is able to lift up against gravity bilaterally. Work-up in the emergency room consisted of labs, urinalysis, and CT scans. He was also given a Lidoderm patch. Creatinine was at baseline. CT scan of the abdomen pelvis with recons of the lumbar spine do indicated there are compression fractures at level L1 and level L4. A consult was placed to neurosurgery. Please see resident note for reassessments and final disposition.      Martin Meeks MD  04/04/21 [id number]

## 2021-04-04 NOTE — ED TRIAGE NOTES
STATES HE'S HAD BACK PAIN SINCE HE WAS 21YEARS OLD.   IN THE LAST WEEK PAIN HAS GOTTEN WORSE, FELT LIKE HE MIGHT FALL TODAY DUE TO HIS YADIRA, ALSO STATES THAT THE PAIN RADIATES DOWN BEHIND HIS KNEES

## 2021-04-04 NOTE — ED PROVIDER NOTES
[facility] RESIDENT NOTE       Date of evaluation: 4/4/2021    Chief Complaint     Back Pain      History of Present Illness     Corina Russo is a 80 y.o. male who presents  heart failure, A. fib with pacemaker, CKD and on Eliquis who now presents with back pain. Pain started 2 days prior to presentation when he was getting up from a seated position. Reports his pain is in the lumbar area on both sides. Pain described as sharp 10 out of 10, worse on sitting and walking, relieved at rest and the pain woke him up couple times during the night. Reports he has tried Tylenol but it has not helped. Reports occasionally the pain goes down his posterior thigh. He is report his weight he has had a 15 pound weight loss recently but he has been exercising more and eating well at a skilled nursing facility so was intentional.     Denies fevers chills, numbness or tingling in his extremities, sensory deficit, weakness in his legs, chest pain, shortness of breath, change in urinary habits, urinary incontinence or retention, change in bowel habits, loss of bowel control. Denies blood in stool blood in urine. Denies history of kidney stones. uses a cane to ambulate. Review of Systems     Review of Systems   As Per HPI and Interval History. Past Medical, Surgical, Family, and Social History     He has a past medical history of Hypertension and SVT (supraventricular tachycardia) ([address] He has a past surgical history that includes Prostate surgery; sinus surgery; and Carpal tunnel release. His family history includes Heart Disease in his sister; Kidney Disease in his sister. He reports that he quit smoking about 25 years ago. His smoking use included pipe and cigars. He has never used smokeless tobacco. He reports current alcohol use. He reports that he does not use drugs.     Medications     Previous Medications    ELIQUIS 2.5 MG TABS TABLET    TAKE 1 TABLET BY MOUTH TWICE A DAY    FINASTERIDE (PROSCAR) 5 MG TABLET    Take 5 mg by mouth daily     FUROSEMIDE (LASIX) 40 MG TABLET    Take 1 tablet by mouth 2 times daily    METOPROLOL SUCCINATE (TOPROL XL) 25 MG EXTENDED RELEASE TABLET    TAKE 1 TABLET EVERY DAY    SIMVASTATIN (ZOCOR) 40 MG TABLET    TAKE 1 TABLET IN THE EVENING    TIMOLOL (TIMOPTIC-XE) 0.5 % OPHTHALMIC GEL-FORMING    Place 1 drop into both eyes nightly        Allergies     He has No Known Allergies. Physical Exam     INITIAL VITALS: BP: (!) 140/80, Temp: 98.5 °F (36.9 °C), Pulse: 75, Resp: 20,     Physical Exam  Constitutional:       General: He is not in acute distress. HENT:      Mouth/Throat:      Mouth: Mucous membranes are moist.   Eyes:      General: No scleral icterus. Extraocular Movements: Extraocular movements intact. Cardiovascular:      Rate and Rhythm: Normal rate and regular rhythm. Heart sounds: No murmur. Pulmonary:      Effort: Pulmonary effort is normal.      Breath sounds: No wheezing or rales. Abdominal:      Palpations: Abdomen is soft. Tenderness: There is no abdominal tenderness. Musculoskeletal: Normal range of motion. Comments: Tenderness in the paraspinal muscles in the lumbar area. Negative straight leg raise test bilaterally. No saddle anesthesia. Neurological:      General: No focal deficit present. Mental Status: He is alert and oriented to person, place, and time. Sensory: No sensory deficit. Motor: No weakness. Comments: 5/5 strength on flexion, Extension knee. 5 out of 5 strength on dorsi and plantar flexion. Diagnostic Results       RADIOLOGY:  CT ABDOMEN PELVIS WO CONTRAST Additional Contrast? None   Final Result      ABDOMEN/PELVIS:   No acute abdominopelvic abnormality. Prostatomegaly. Nonspecific 6 mm right renal hyperdense lesion. This may be too small to accurately characterize by renal protocol CT.  Given the patient's age, consider 5.1 mmol/L    Chloride 100 99 - 110 mmol/L    CO2 22 21 - 32 mmol/L    Anion Gap 14 3 - 16    Glucose 108 (H) 70 - 99 mg/dL    BUN 56 (H) 7 - 20 mg/dL    CREATININE 2.5 (H) 0.8 - 1.3 mg/dL    GFR Non-African American 24 (A) >60    GFR  29 (A) >60    Calcium 9.9 8.3 - 10.6 mg/dL    Total Protein 8.0 6.4 - 8.2 g/dL    Albumin 3.6 3.4 - 5.0 g/dL    Albumin/Globulin Ratio 0.8 (L) 1.1 - 2.2    Total Bilirubin 1.1 (H) 0.0 - 1.0 mg/dL    Alkaline Phosphatase 141 (H) 40 - 129 U/L    ALT 14 10 - 40 U/L    AST 24 15 - 37 U/L    Globulin 4.4 g/dL   Urinalysis, reflex to microscopic   Result Value Ref Range    Color, UA Yellow Straw/Yellow    Clarity, UA Clear Clear    Glucose, Ur Negative Negative mg/dL    Bilirubin Urine Negative Negative    Ketones, Urine Negative Negative mg/dL    Specific Gravity, UA 1.020 1.005 - 1.030    Blood, Urine TRACE-LYSED (A) Negative    pH, UA 7.0 5.0 - 8.0    Protein, UA Negative Negative mg/dL    Urobilinogen, Urine 0.2 <2.0 E.U./dL    Nitrite, Urine Negative Negative    Leukocyte Esterase, Urine Negative Negative    Microscopic Examination YES     Urine Type Voided    Microscopic Urinalysis   Result Value Ref Range    WBC, UA 0-2 0 - 5 /HPF    RBC, UA 3-4 0 - 4 /HPF    Epithelial Cells, UA 2-5 0 - 5 /HPF       RECENT VITALS:  BP: (!) 140/80, Temp: 98.5 °F (36.9 °C),Pulse: 75, Resp: 20,       Procedures     none  ED Course     Nursing Notes, Past Medical Hx, Past Surgical Hx, Social Hx, Allergies, and FamilyHx were reviewed. The patient was giventhe following medications:  Orders Placed This Encounter   Medications    lidocaine 4 % external patch 1 patch    lidocaine (LIDODERM) 5 %     Sig: Place 1 patch onto the skin daily for 10 days 12 hours on, 12 hours off. Dispense:  10 patch     Refill:  0       CONSULTS:  Joe Alonso / CHRISTAL / Grzegorz Medeiros is a 80 y.o. male presents with 2-day history of back pain. Alarming signs include weight loss and pain that wakes him up at night. Patient has CKD and labs here with creatinine at baseline. No leukocytosis. unable to get a contrast study. Urinalysis without blood , LSE or nitrites . CT abdomen pelvis, lumbar spine without contrast demonstrated acute  mild to moderate inferior endplate compression fracture of the L1 with minimal retropulsion, subacute to chronic inferior endplate compression fracture of L4. Neurosurgery consult was placed. They reviewed the patient's CT findings, and we discussed his exam, they recommended TLSO brace and outpatient follow-up as he does not need any acute surgical intervention. Agreeable to discharge home and given strict return instructions for the ED. He was given a Lidoderm patch, which led to improvement in the back pain. We will use Tylenol and lidocaine patch for pain control as outpatient. This patient was also evaluated by the attending physician. All care plans were discussed and agreed upon. Clinical Impression     1. Acute bilateral low back pain without sciatica    2. Closed fracture of first lumbar vertebra, unspecified fracture morphology, initial encounter ([facility]    3. Closed fracture of fourth lumbar vertebra, unspecified fracture morphology, initial encounter [facility])        Disposition     PATIENT REFERRED TO:  Nimesh Polanco MD  [facility], [address] [facility]  [phone]    In 1 week  ED visit, back pain    Yessica Moser MD  [address] [place] [address] [phone]      Back pain, Lumbar fracture followup      DISCHARGE MEDICATIONS:  New Prescriptions    LIDOCAINE (LIDODERM) 5 %    Place 1 patch onto the skin daily for 10 days 12 hours on, 12 hours off.        Discharge - Pending Orders Complete 04/04/2021 06:47:18 PM       Daija Shankar MD  Resident  04/04/21 8889

## 2021-04-05 ENCOUNTER — TELEPHONE (OUTPATIENT)
Dept: CARDIOLOGY CLINIC | Age: 86
End: 2021-04-05

## 2021-04-05 VITALS
BODY MASS INDEX: 22.48 KG/M2 | HEIGHT: 72 IN | SYSTOLIC BLOOD PRESSURE: 127 MMHG | DIASTOLIC BLOOD PRESSURE: 74 MMHG | TEMPERATURE: 97.4 F | OXYGEN SATURATION: 98 % | WEIGHT: 166 LBS | RESPIRATION RATE: 16 BRPM | HEART RATE: 83 BPM

## 2021-04-05 LAB
ANION GAP SERPL CALCULATED.3IONS-SCNC: 12 MMOL/L (ref 3–16)
BASOPHILS ABSOLUTE: 0 K/UL (ref 0–0.2)
BASOPHILS RELATIVE PERCENT: 0.5 %
BUN BLDV-MCNC: 55 MG/DL (ref 7–20)
CALCIUM SERPL-MCNC: 9.6 MG/DL (ref 8.3–10.6)
CHLORIDE BLD-SCNC: 102 MMOL/L (ref 99–110)
CO2: 26 MMOL/L (ref 21–32)
CREAT SERPL-MCNC: 2.4 MG/DL (ref 0.8–1.3)
EOSINOPHILS ABSOLUTE: 0.1 K/UL (ref 0–0.6)
EOSINOPHILS RELATIVE PERCENT: 1 %
GFR AFRICAN AMERICAN: 30
GFR NON-AFRICAN AMERICAN: 25
GLUCOSE BLD-MCNC: 102 MG/DL (ref 70–99)
HCT VFR BLD CALC: 29.6 % (ref 40.5–52.5)
HEMOGLOBIN: 10 G/DL (ref 13.5–17.5)
LYMPHOCYTES ABSOLUTE: 0.4 K/UL (ref 1–5.1)
LYMPHOCYTES RELATIVE PERCENT: 7.2 %
MCH RBC QN AUTO: 30.5 PG (ref 26–34)
MCHC RBC AUTO-ENTMCNC: 33.6 G/DL (ref 31–36)
MCV RBC AUTO: 90.7 FL (ref 80–100)
MONOCYTES ABSOLUTE: 1 K/UL (ref 0–1.3)
MONOCYTES RELATIVE PERCENT: 17.6 %
NEUTROPHILS ABSOLUTE: 4.2 K/UL (ref 1.7–7.7)
NEUTROPHILS RELATIVE PERCENT: 73.7 %
PDW BLD-RTO: 15.3 % (ref 12.4–15.4)
PLATELET # BLD: 191 K/UL (ref 135–450)
PMV BLD AUTO: 8.4 FL (ref 5–10.5)
POTASSIUM REFLEX MAGNESIUM: 4 MMOL/L (ref 3.5–5.1)
RBC # BLD: 3.27 M/UL (ref 4.2–5.9)
SARS-COV-2, NAAT: NOT DETECTED
SODIUM BLD-SCNC: 140 MMOL/L (ref 136–145)
WBC # BLD: 5.7 K/UL (ref 4–11)

## 2021-04-05 PROCEDURE — 80048 BASIC METABOLIC PNL TOTAL CA: CPT

## 2021-04-05 PROCEDURE — G0378 HOSPITAL OBSERVATION PER HR: HCPCS

## 2021-04-05 PROCEDURE — 97535 SELF CARE MNGMENT TRAINING: CPT

## 2021-04-05 PROCEDURE — 97162 PT EVAL MOD COMPLEX 30 MIN: CPT

## 2021-04-05 PROCEDURE — 97530 THERAPEUTIC ACTIVITIES: CPT

## 2021-04-05 PROCEDURE — 6370000000 HC RX 637 (ALT 250 FOR IP): Performed by: INTERNAL MEDICINE

## 2021-04-05 PROCEDURE — 97166 OT EVAL MOD COMPLEX 45 MIN: CPT

## 2021-04-05 PROCEDURE — 36415 COLL VENOUS BLD VENIPUNCTURE: CPT

## 2021-04-05 PROCEDURE — 85025 COMPLETE CBC W/AUTO DIFF WBC: CPT

## 2021-04-05 PROCEDURE — 87635 SARS-COV-2 COVID-19 AMP PRB: CPT

## 2021-04-05 RX ADMIN — ACETAMINOPHEN 650 MG: 325 TABLET ORAL at 08:53

## 2021-04-05 RX ADMIN — METOPROLOL SUCCINATE 25 MG: 25 TABLET, EXTENDED RELEASE ORAL at 08:52

## 2021-04-05 RX ADMIN — FINASTERIDE 5 MG: 5 TABLET, FILM COATED ORAL at 08:52

## 2021-04-05 RX ADMIN — ATORVASTATIN CALCIUM 20 MG: 20 TABLET, FILM COATED ORAL at 08:52

## 2021-04-05 RX ADMIN — APIXABAN 2.5 MG: 2.5 TABLET, FILM COATED ORAL at 08:52

## 2021-04-05 ASSESSMENT — PAIN DESCRIPTION - PAIN TYPE
TYPE: CHRONIC PAIN
TYPE: CHRONIC PAIN

## 2021-04-05 ASSESSMENT — PAIN DESCRIPTION - LOCATION: LOCATION: BACK

## 2021-04-05 ASSESSMENT — PAIN DESCRIPTION - ONSET
ONSET: ON-GOING
ONSET: ON-GOING

## 2021-04-05 ASSESSMENT — PAIN SCALES - GENERAL
PAINLEVEL_OUTOF10: 3
PAINLEVEL_OUTOF10: 1

## 2021-04-05 ASSESSMENT — PAIN DESCRIPTION - DESCRIPTORS: DESCRIPTORS: ACHING

## 2021-04-05 NOTE — CARE COORDINATION
Disposition: [place] (SNF): [facility] Phone: [phone] Fax: [phone]    LOC at discharge: Skilled  [address] Completed: Yes    Notification completed in HENS/PAS?:  Yes : CM has completed HENS online through secure website for SNF admission at [facility]. Document ID #: [id number]    IMM Completed:   Not Indicated    Transportation:  Transportation PLAN for discharge: EMS transportation   Mode of Transport: Ambulance stretcher - BLS  Reason for medical transport: Other: L1 compression fx, high fall risk  Name of Transport Company: [employer]  Phone: [phone]  Time of Transport: 7pm    Transport form completed: Yes    Additional CM Notes: Pt from home alone, will Dc to [facility] at 7pm today via [facility] [phone]. Hens completed, Marcos Simons will take precert pending, pt aware will call family to update. The Plan for Transition of Care is related to the following treatment goals of Closed compression fracture of body of L1 vertebra ([facility] [address] [S32.010A]    The Patient and/or patient representative Will [place] and his family were provided with a choice of provider and agrees with the discharge plan Yes    Freedom of choice list was provided with basic dialogue that supports the patient's individualized plan of care/goals and shares the quality data associated with the providers.  Yes    Care Transitions patient: No    Luis Vogel RN  [facility]  Case Management Department  Ph: [phone]  Fax: [phone]

## 2021-04-05 NOTE — DISCHARGE INSTR - COC
Continuity of Care Form    Patient Name: Parish Mckeon   :  1921  MRN:  [id number]    Admit date:  2021  Discharge date:  2021    Code Status Order: Full Code   Advance Directives:   Advance Care Flowsheet Documentation       Date/Time Healthcare Directive Type of Healthcare Directive Copy in [address] Agent's Name Healthcare Agent's Phone Number    [phone]  Unknown, patient unable to respond due to medical condition pt unsure  --  --  --  --  --            Admitting Physician:  Douglas Hickey MD  PCP: Silke Broussard MD    Discharging Nurse: Tonio Santoyo [facility] Unit/Room#: 4899/4420-06  Discharging Unit Phone Number: [phone]    Emergency Contact:   Extended Emergency Contact Information  Primary Emergency Contact: [address] Phone: [phone]  Relation: Child    Past Surgical History:  Past Surgical History:   Procedure Laterality Date    CARPAL TUNNEL RELEASE      PROSTATE SURGERY      SINUS SURGERY         Immunization History: There is no immunization history on file for this patient.     Active Problems:  Patient Active Problem List   Diagnosis Code    Acute non-ST-elevation MI following previous MI ([facility] [address] I22.2    Hypertension I10    Permanent atrial fibrillation I48.21    CKD (chronic kidney disease) stage 3, GFR 30-59 ml/min N18.30    Cardiac pacemaker in situ Z95.0    Bradycardia R00.1    Chest pain R07.9    Exertional dyspnea R06.00    Localized edema R60.0    Chronic heart failure with preserved ejection fraction (HFpEF) ([facility]) I50.32    Closed compression fracture of body of L1 vertebra ([facility]) S32.010A       Isolation/Infection:   Isolation            No Isolation          Patient Infection Status       None to display            Nurse Assessment:  Last Vital Signs: /74   Pulse 83   Temp 97.4 °F (36.3 °C) (Oral)   Resp 16   Ht 5' 11.5\" (1.816 m)   Wt 166 lb (75.3 kg)   SpO2 98%   BMI 22.83 kg/m²     Last documented pain score (0-10 scale): Pain Level: 1  Last Weight:   Wt Readings from Last 1 Encounters:   04/04/21 166 lb (75.3 kg)     Mental Status:  alert    IV Access:  - None    Nursing Mobility/ADLs:  Walking   Assisted  Transfer  Assisted  Bathing  Assisted  Dressing  Assisted  Toileting  Assisted  Feeding  Assisted  Med Admin  Assisted  Med Delivery   whole    Wound Care Documentation and Therapy:        Elimination:  Continence: Bowel: Yes  Bladder: Yes  Urinary Catheter: None   Colostomy/Ileostomy/Ileal Conduit: No       Date of Last BM: 04/05/2021    Intake/Output Summary (Last 24 hours) at 4/5/2021 1140  Last data filed at 4/5/2021 1033  Gross per 24 hour   Intake 240 ml   Output 675 ml   Net -435 ml     I/O last 3 completed shifts:  In: -   Out: 400 [Urine:400]    Safety Concerns: At Risk for Falls    Impairments/Disabilities:      None    Nutrition Therapy:  Current Nutrition Therapy:   - Oral Diet:  General    Routes of Feeding: Oral  Liquids: No Restrictions  Daily Fluid Restriction: no  Last Modified Barium Swallow with Video (Video Swallowing Test): not done    Treatments at the Time of Hospital Discharge:   Respiratory Treatments:   Oxygen Therapy:  is not on home oxygen therapy.   Ventilator:    - No ventilator support    Rehab Therapies: Physical Therapy  Weight Bearing Status/Restrictions: No weight bearing restirctions  Other Medical Equipment (for information only, NOT a DME order):  walker  Other Treatments:     Patient's personal belongings (please select all that are sent with patient):  clothing     RN SIGNATURE:  Electronically signed by Marla Corrales RN on 4/5/21 at 3:06 PM EDT    CASE MANAGEMENT/SOCIAL WORK SECTION    Inpatient Status Date: ***    Readmission Risk Assessment Score:  Readmission Risk              Risk of Unplanned Readmission:        12           Discharging to Facility/ [facility] [address], [facility] [address]       Phone: [phone]       Fax: [phone]            / signature: Electronically signed by Eloisa Guerra RN on 4/5/21 at 12:45 PM EDT    PHYSICIAN SECTION    Prognosis: Fair    Condition at Discharge: Stable    Rehab Potential (if transferring to Rehab): Good    Recommended Labs or Other Treatments After Discharge:   Physical and occupational therapy  TLSO brace at all times except sleeping  Once dc from rehab followup with PCP  Follow up with interventional radiology for [facility]    Follow up with NS in the office:  Bobby Escalante MD  [address] [facility]  [phone]    Physician Certification: I certify the above information and transfer of Thomas Samuels  is necessary for the continuing treatment of the diagnosis listed and that he requires [facility] for less 30 days.      Update Admission H&P: No change in H&P    PHYSICIAN SIGNATURE:  Electronically signed by Dada Torres MD on 4/5/21 at 11:42 AM EDT

## 2021-04-05 NOTE — PLAN OF CARE
Problem: Falls - Risk of:  Goal: Will remain free from falls  Description: Will remain free from falls  4/5/2021 1503 by Katy Howard RN  Outcome: Completed  4/5/2021 1009 by Katy Howard RN  Outcome: Ongoing  Note: Call light within reach; bed alarm engaged   Goal: Absence of physical injury  Description: Absence of physical injury  Outcome: Completed     Problem: Pain:  Goal: Pain level will decrease  Description: Pain level will decrease  4/5/2021 1503 by Katy Howard RN  Outcome: Completed  4/5/2021 1009 by Katy Howard RN  Outcome: Ongoing  Note: Patient alerts RN of increasing pain; RN administers pain medication as ordered   Goal: Control of acute pain  Description: Control of acute pain  Outcome: Completed  Goal: Control of chronic pain  Description: Control of chronic pain  Outcome: Completed

## 2021-04-05 NOTE — PROGRESS NOTES
Patient Aox4 and able to make needs known; compliant with all medications as ordered; VSS: A fib on tele; complaining of LBP while up in chair, then back to bed; bowel sounds audible 4q; IVF as ordered; tolerating diet; call light within reach; bed alarm engaged; will continue to monitor.      Electronically signed by Miesha Huitron RN on 4/5/2021 at 10:41 AM

## 2021-04-05 NOTE — TELEPHONE ENCOUNTER
Spoke with Jaimee Stanley. States that the only symptom the pt has is back pain. This is an ongoing issue that flares up every now and then. Typically his HR is in the 60's. On Thurs it was in the 80's. When she saw him on Fri, it was 's. She is unsure how his HR ran over the weekend. SBPs have been stable 110-120. PT is visiting the pt today and will check vitals and call us today with an update.

## 2021-04-05 NOTE — PROGRESS NOTES
Patient refusing to wear TSLO brace     Educated on benefits/risks     Continuing to refuse    Will monitor    Electronically signed by Johnnie Solorzano RN on 4/5/2021 at 9:05 AM

## 2021-04-05 NOTE — H&P
Hospital Medicine History & Physical      PCP: Alejandro Barnett MD    Date of Admission: 4/4/2021    Date of Service: Pt seen/examined on 4/4/2021 and Admitted to Inpatient with expected LOS greater than two midnights due to medical therapy. Chief Complaint: Back pain      History Of Present Illness:      80 y.o. male who presents with complaints of back pain for the past 2 days. Patient lives alone at home and denies any falls. He is on Eliquis for chronic atrial fibrillation. Patient states that the pain is excruciating, has never had a pain like this before, rates the pain as 10/10. Pain started 2 days ago when he was getting up from a seated position. Pain gets worse upon sitting up and walking and relieved with rest.  This pain has woke him up couple of times during the night. Patient has taken ibuprofen and Tylenol at home without any relief. Patient walks with a cane at baseline, but since the onset of back pain he has been having difficulty ambulating even with a cane. Currently patient denies any pain at rest.  Able to lift both lower extremities all the way up with no acute pain in his lower back    Denies any bowel or bladder incontinence or saddle anesthesia. Denies headache, changes in vision, nausea or vomiting, weakness or numbness in his extremities, chest or abdominal pain. Past Medical History:          Diagnosis Date    Hypertension     SVT (supraventricular tachycardia) (HCC)        Past Surgical History:          Procedure Laterality Date    CARPAL TUNNEL RELEASE      PROSTATE SURGERY      SINUS SURGERY         Medications Prior to Admission:      Prior to Admission medications    Medication Sig Start Date End Date Taking?  Authorizing Provider   lidocaine (LIDODERM) 5 % Place 1 patch onto the skin daily for 10 days 12 hours on, 12 hours off. 4/4/21 4/14/21 Yes Nasra Zimmerman MD   ELIQUIS 2.5 MG TABS tablet TAKE 1 TABLET BY MOUTH TWICE A DAY 4/1/21  Yes LASHELL Cordon - murmurs, rubs or gallops. Abdomen: Soft, non-tender, non-distended with normal bowel sounds. Musculoskeletal:  No clubbing, cyanosis or edema bilaterally. Full range of motion without deformity. Some tenderness to palpation over the lumbar spine bilaterally, no CVA tenderness, straight leg raising test negative bilaterally, no saddle anesthesia. Skin: Skin color, texture, turgor normal.  No rashes or lesions. Neurologic:  Neurovascularly intact without any focal sensory/motor deficits. Cranial nerves: II-XII intact, grossly non-focal.  5/5 motor in upper and lower extremities. Sensation-intact, able to lift both legs against gravity. Psychiatric:  Alert and oriented, thought content appropriate, normal insight  Capillary Refill: Brisk,< 3 seconds   Peripheral Pulses: +2 palpable, equal bilaterally       Labs:     Recent Labs     04/04/21  1453   WBC 5.9   HGB 10.2*   HCT 30.2*        Recent Labs     04/04/21  1453      K 3.7      CO2 22   BUN 56*   CREATININE 2.5*   CALCIUM 9.9     Recent Labs     04/04/21  1453   AST 24   ALT 14   BILITOT 1.1*   ALKPHOS 141*     No results for input(s): INR in the last 72 hours. No results for input(s): Jennifer Burdock in the last 72 hours. Urinalysis:      Lab Results   Component Value Date    NITRU Negative 04/04/2021    WBCUA 0-2 04/04/2021    BACTERIA Rare 04/10/2012    RBCUA 3-4 04/04/2021    BLOODU TRACE-LYSED 04/04/2021    SPECGRAV 1.020 04/04/2021    GLUCOSEU Negative 04/04/2021    GLUCOSEU Negative 06/19/2011       Radiology:     CXR: I have reviewed the CXR with the following interpretation: N/A  EKG:  I have reviewed the EKG with the following interpretation: N/A    CT ABDOMEN PELVIS WO CONTRAST Additional Contrast? None   Final Result      ABDOMEN/PELVIS:   No acute abdominopelvic abnormality. Prostatomegaly. Nonspecific 6 mm right renal hyperdense lesion. This may be too small to accurately characterize by renal protocol CT. Given the patient's age, consider follow-up imaging as indicated      LUMBAR SPINE:   Suspected acute mild to moderate inferior endplate compression fracture of L1 with minimal retropulsion causing no spinal canal compromise. Age-indeterminate, favored subacute to chronic inferior endplate compression fracture of L4. Extensive degenerative disc disease. INCIDENTAL FINDINGS:      CT LUMBAR RECONSTRUCTION WO POST PROCESS   Final Result      ABDOMEN/PELVIS:   No acute abdominopelvic abnormality. Prostatomegaly. Nonspecific 6 mm right renal hyperdense lesion. This may be too small to accurately characterize by renal protocol CT. Given the patient's age, consider follow-up imaging as indicated      LUMBAR SPINE:   Suspected acute mild to moderate inferior endplate compression fracture of L1 with minimal retropulsion causing no spinal canal compromise. Age-indeterminate, favored subacute to chronic inferior endplate compression fracture of L4. Extensive degenerative disc disease. INCIDENTAL FINDINGS:          ASSESSMENT:    Active Hospital Problems    Diagnosis Date Noted    Closed compression fracture of body of L1 vertebra ([facility] [address] [S32.010A] 04/04/2021   #Acute L1 compression fracture  #Subacute-chronic L4 fracture  #Chronic atrial fibrillation, on anticoagulation with Eliquis, status post PPM  #ROSALINO on CKD, elevated serum creatinine to 2.5-patient states that he is unaware of any kidney issues in the past.  Last serum creatinine available is from 2011 which was 1.8. #Chronic anemia, H&H from 2011 = 11.3/33.6    PLAN:  -Initial plan was to discharge patient home after consulting neurosurgery and placing him on TLSO brace. But patient was unable to ambulate due to severe pain and agreed for rehab placement as he is living alone at home  -Continue Lidoderm patch  -Pain relief as needed.   Currently patient is pain-free  -PT/OT  -Continue on TLSO brace  -Neurochecks every 4 hourly  -Social service consult  -Monitor electrolytes, renal function and CBC  -Supportive therapy    DVT Prophylaxis: Eliquis  Diet: DIET GENERAL;  Code Status: Full Code    PT/OT Eval Status: As tolerated with fall precautions and assistance    Dispo -GMF with telemetry       Joseline Gresham MD    Thank you Chance Myers MD for the opportunity to be involved in this patient's care. If you have any questions or concerns please feel free to contact me at [phone].

## 2021-04-05 NOTE — PROGRESS NOTES
Physical Therapy    Facility/Department: [address]  Initial Assessment and Treatment    NAME: Larissa Flores  : 1921  MRN: [id number]    Date of Service: 2021    Discharge Recommendations:    Larissa Flores scored a 15/24 on the AM-PAC short mobility form. Current research shows that an AM-PAC score of 17 or less is typically not associated with a discharge to the patient's home setting. Based on the patient's AM-PAC score and their current functional mobility deficits, it is recommended that the patient have 3-5 sessions per week of Physical Therapy at d/c to increase the patient's independence. Please see assessment section for further patient specific details. If patient discharges prior to next session this note will serve as a discharge summary. Please see below for the latest assessment towards goals. PT Equipment Recommendations  Equipment Needed: No    Assessment   Body structures, Functions, Activity limitations: Decreased functional mobility ; Decreased endurance; Increased pain  Assessment: Pt with decreased independent mobility from baseline. Pt reports normally independent with mobility with cane or walker. Currently needing CGA for transfers and to ambulate a few steps with walker. Limited by pain. At risk for falls and not safe to get up alone. Safety concerns with pt returning home alone at this time 2/2 pt unable to walk more than a few steps. Pt would benefit from continued IP PT at d/c. If pt goes home, rec 24 hr assist and home PT to maximize mobility and independence  Treatment Diagnosis: impaired functional mobility 2/2 back pain  Decision Making: Medium Complexity  PT Education: Goals;PT Role;Plan of Care;General Safety; Functional Mobility Training  Patient Education: Pt verbalized understanding  REQUIRES PT FOLLOW UP: Yes       Patient Diagnosis(es): The primary encounter diagnosis was Acute bilateral low back pain without sciatica.  Diagnoses of Closed fracture of first lumbar vertebra, unspecified fracture morphology, initial encounter ([facility] [address] and Closed fracture of fourth lumbar vertebra, unspecified fracture morphology, initial encounter [facility]) were also pertinent to this visit. has a past medical history of Hypertension and SVT (supraventricular tachycardia) ([facility] [address] has a past surgical history that includes Prostate surgery; sinus surgery; and Carpal tunnel release. Restrictions  Position Activity Restriction  Other position/activity restrictions: up with assist, TLSO referenced in notes however no orders in chart - pt declines to wear at this time  Vision/Hearing  Vision: Within Functional Limits(glasses for reading and watching TV)  Hearing: Exceptions to [facility]  Hearing Exceptions: Hard of hearing/hearing concerns     Subjective  General  Chart Reviewed: Yes  Additional Pertinent Hx: Pt is a 80 y.o. male adm 4/4 with compression fracture of L1. Presented to ED with back pain. CT abd/pelvis: neg. CT Lspine:Suspected acute mild to moderate inferior endplate compression fracture of L1 with minimal retropulsion causing no spinal canal compromise   PMH: HTN, SVT  Referring Practitioner: Arianna Melendrez MD  Referral Date : 04/04/21  Subjective  Subjective: Pt found supine. Agreeable to PT. Pain Screening  Patient Currently in Pain: Yes(back, no c/o at rest.  Increased with activity - did not rate. \"I know it's there. \"  RN informed)  Vital Signs  Patient Currently in Pain: Yes(back, no c/o at rest.  Increased with activity - did not rate. \"I know it's there. \"  RN informed)       Orientation  Orientation  Overall Orientation Status: Within Functional Limits  Social/Functional History  Social/Functional History  Lives With: Alone  Type of Home: Apartment(3rd floor in Senior Building)  Home Layout: One level  Home Access: Stairs to enter with rails(2 GAGE, elevator to 3rd floor)  Bathroom Shower/Tub: Tub/Shower unit  Bathroom Equipment: Grab bars in shower, Grab bars around toilet, Shower chair  Home Equipment: 4 wheeled walker, Cane, Lift chair, Alert [employer]  ADL Assistance: Independent  Homemaking Assistance: Needs assistance(has lady come 1x/week for cleaning/laundry, gets MOW)  Ambulation Assistance: Independent(uses cane in apartment, walker when goes out)  Transfer Assistance: Independent  Active : Yes(\"I don't drive too far and I don't drive at night\")  Additional Comments: Had a fall about 4 months ago. \"I was trying to move something and I fell. \"  Reports has weakness in the L leg. Was getting home PT prior to this admission. Has daughter and grand-children in town. Cognition        Objective          AROM RLE (degrees)  RLE AROM: WFL  AROM LLE (degrees)  LLE AROM : WFL  Strength RLE  Strength RLE: WFL  Strength LLE  Strength LLE: WFL        Bed mobility  Supine to Sit: Stand by assistance(via logroll, HOB slightly elevated and with rail)  Transfers  Sit to Stand: Contact guard assistance(cues for hand placement)  Stand to sit: Contact guard assistance(cues for safety and hand placement)  Bed to Chair: Contact guard assistance(stand pivot with rolling walker)  Ambulation  Ambulation?: Yes  Ambulation 1  Device: Rolling Walker  Assistance: Contact guard assistance  Quality of Gait: decreased bilat step length/height, trunk slightly flexed  Distance: 3-4 steps. Declined further gt 2/2 pain  Comments: Pt declined to wear TLSO. RN informed. Reports he has a brace at home that he got from a chiropractor and would like to use that.        Treatment included: bed mobility, transfers, gt, pt education          Plan   Plan  Times per week: 2-5  Current Treatment Recommendations: Functional Mobility Training, Gait Training, Stair training, Endurance Training, Safety Education & Training, Patient/Caregiver Education & Training  Safety Devices  Type of devices: Call light within reach, Chair alarm in place, Nurse notified, Left in chair    G-Code OutComes Score                                                  AM-PAC Score  AM-PAC Inpatient Mobility Raw Score : 15 (04/05/21 1007)  AM-PAC Inpatient T-Scale Score : 39.45 (04/05/21 1007)  Mobility Inpatient CMS 0-100% Score: 57.7 (04/05/21 1007)  Mobility Inpatient CMS G-Code Modifier : CK (04/05/21 1007)          Goals  Short term goals  Time Frame for Short term goals: By discharge  Short term goal 1: Sup to sit supervision  Short term goal 2: Pt will transfer sit to stand supervision  Short term goal 3: Pt will amb >80' with LRAD supervision  Short term goal 4: Pt will up/down 2 steps with rail and LRAD with SBA (if going home)       Therapy Time   Individual Concurrent Group Co-treatment   Time In 0810         Time Out 0851         Minutes 41               Timed Code Treatment Minutes: 26      Total Treatment Minutes:  Haim 95, PT

## 2021-04-05 NOTE — PLAN OF CARE
Problem: Falls - Risk of:  Goal: Will remain free from falls  Description: Will remain free from falls  Outcome: Ongoing  Note: Call light within reach; bed alarm engaged      Problem: Pain:  Goal: Pain level will decrease  Description: Pain level will decrease  Outcome: Ongoing  Note: Patient alerts RN of increasing pain; RN administers pain medication as ordered

## 2021-04-05 NOTE — PROGRESS NOTES
Occupational Therapy   Occupational Therapy Initial Assessment/Treatment  Date: 2021   Patient Name: June Raygoza  MRN: [id number]     : 1921    Date of Service: 2021    Discharge Recommendations:    June Raygoza scored a 18/24 on the AM-PAC ADL Inpatient form. Current research shows that an AM-PAC score of 17 or less is typically not associated with a discharge to the patient's home setting. Based on the patient's AM-PAC score and their current ADL deficits, it is recommended that the patient have 3-5 sessions per week of Occupational Therapy at d/c to increase the patient's independence. Please see assessment section for further patient specific details. If patient discharges prior to next session this note will serve as a discharge summary. Please see below for the latest assessment towards goals. OT Equipment Recommendations  Equipment Needed: No  Other: defer    Assessment   Performance deficits / Impairments: Decreased functional mobility ; Decreased ADL status; Decreased endurance;Decreased balance  Assessment: Pt presents with a decline in functional independence. Pt is from home alone and was independent. Currently, pt is requiring assist with mobility and ADL. Pt is at risk for falls and safety is a concern. Feel pt would benefit from further OT services. Treatment Diagnosis: Impaired ADL and functional mobility  Prognosis: Good  Decision Making: Medium Complexity  OT Education: OT Role;Plan of Care;Transfer Training  Patient Education: Pt verbalized understanding  REQUIRES OT FOLLOW UP: Yes  Activity Tolerance  Activity Tolerance: Patient Tolerated treatment well  Safety Devices  Safety Devices in place: Yes  Type of devices: Left in chair;Call light within reach; Chair alarm in place;Nurse notified(meal tray in place)         Treatment Diagnosis: Impaired ADL and functional mobility      Restrictions  Position Activity Restriction  Other position/activity restrictions: Exceptions to [facility]  Hearing Exceptions: Hard of hearing/hearing concerns    Orientation  Overall Orientation Status: Within Functional Limits     Balance  Sitting Balance: Supervision  Standing Balance: Contact guard assistance  Standing Balance  Activity: transfer  Functional Mobility  Functional Mobility Comments: Pt did not feel he could walk into the bathroom  ADL  Feeding: Independent  LE Dressing: Moderate assistance  Toileting: (denied need)  Tone RUE  RUE Tone: Normotonic  Tone LUE  LUE Tone: Normotonic  Coordination  Movements Are Fluid And Coordinated: Yes     Bed mobility  Supine to Sit: Stand by assistance(via logroll, HOB slightly elevated and with rail)  Transfers  Stand Step Transfers: Contact guard assistance(bed to chair)  Sit to stand: Contact guard assistance  Stand to sit: Contact guard assistance     Cognition  Overall Cognitive Status: WFL                 LUE AROM (degrees)  LUE AROM : WFL  RUE AROM (degrees)  RUE AROM : WFL  LUE Strength  Gross LUE Strength: WFL  RUE Strength  Gross RUE Strength: WFL     Hand Dominance  Hand Dominance: Right     Treatment included ADL and transfer training. Plan   Plan  Times per week: 2-5  Current Treatment Recommendations: Strengthening, Balance Training, Functional Mobility Training, Endurance Training, Self-Care / ADL    AM-[facility] Score        -[facility] Inpatient Daily Activity Raw Score: 18 (04/05/21 1258)  -PAC Inpatient ADL T-Scale Score : 38.66 (04/05/21 1258)  ADL Inpatient CMS 0-100% Score: 46.65 (04/05/21 1258)  ADL Inpatient CMS G-Code Modifier : CK (04/05/21 1258)    Goals                           No goals met  Short term goals  Time Frame for Short term goals: Discharge  Short term goal 1: Transfer to/from toilet with SBA  Short term goal 2: Lower body dressing with SBA  Short term goal 3: Stance with SBA x5 mmin while engaging in ADL/functional mobility  Patient Goals   Patient goals : Go to SNF prior to home.        Therapy Time   Individual Concurrent Group Co-treatment   Time In 0825         Time Out 0851         Minutes 26         Timed Code Treatment Minutes: Snehal 47, OTR/L [id number]

## 2021-04-05 NOTE — PROGRESS NOTES
4 Eyes Admission Assessment     I agree as the admission nurse that 2 RN's have performed a thorough Head to Toe Skin Assessment on the patient. ALL assessment sites listed below have been assessed on admission.        Areas assessed by both nurses:   [x]   Head, Face, and Ears   [x]   Shoulders, Back, and Chest  [x]   Arms, Elbows, and Hands   [x]   Coccyx, Sacrum, and Ischium  [x]   Legs, Feet, and Heels        Does the Patient have Skin Breakdown?  no  Jose Prevention initiated:  no  Wound Care Orders initiated:  no      [facility] nurse consulted for Pressure Injury (Stage 3,4, Unstageable, DTI, NWPT, and Complex wounds) or Jose score 18 or lower:  no     Nurse 1 eSignature:  Electronically signed by Juan Woo RN on 4/4/2021 at 11:00 PM      Nurse 2 eSignature: Sha Estevez RN

## 2021-04-05 NOTE — ED NOTES
Patient's granddaughter here to pick patient up. Attempted to instruct her and patient on application of TLSO brace. Patient unable to stand or sit and safely apply brace to himself. Also states that he didn't have very adequate pain relief with lidoderm patch. Shaun Gill Husbands and Janee Angry in to discuss situation with patient and family member.   Decision made to admit pATIENT FOR pt AND PAIN CONTROL     Patience Soliman RN  04/04/21 2042

## 2021-04-05 NOTE — ED NOTES
report called to [facility], [address] [facility], PCT here to transport     Madhu Shane RN  04/04/21 7140

## 2021-04-05 NOTE — PROGRESS NOTES
Pt ao4, vss. Reported pain is well controlled since medicated in ED. Denied n/v,sob. No bm this shift. Pt understand the need to call for assistance before oob. Bed alarm active w call light in reach.  Will continue to monitor

## 2021-04-05 NOTE — DISCHARGE SUMMARY
Hospital Medicine Discharge Summary    Patient ID: Shamir Vega      Patient's PCP: Tessie Parisi MD    Admit Date: 4/4/2021     Discharge Date:   04/05/2021    Admitting Physician: Levonne Collet, MD     Discharge Physician: Leif Schwarz MD     Discharge Diagnoses:  1. Acute L1 compression fracture  2. Chronic kidney disease stage III, no acute kidney injury  3. Chronic anemia  4. Chronic atrial fibrillation on anticoagulation with apixaban    Active Hospital Problems    Diagnosis Date Noted    Closed compression fracture of body of L1 vertebra ([address] [S32.010A] 04/04/2021       The patient was seen and examined on day of discharge and this discharge summary is in conjunction with any daily progress note from day of discharge. Hospital Course:     42-year-old male admitted with acute L1 compression fracture, subacute chronic L4 fracture. Initially was planned to be discharged home with TLSO brace but unable to ambulate admitted for further management. Started on Lidoderm patch. Seen by PT OT discharge to skilled nursing facility. He will need outpatient kyphoplasty. Physical Exam Performed:     /74   Pulse 83   Temp 97.4 °F (36.3 °C) (Oral)   Resp 16   Ht 5' 11.5\" (1.816 m)   Wt 166 lb (75.3 kg)   SpO2 98%   BMI 22.83 kg/m²     General appearance:  No apparent distress, appears stated age and cooperative. HEENT:  Normal cephalic, atraumatic without obvious deformity. Pupils equal, round, and reactive to light. Extra ocular muscles intact. Conjunctivae/corneas clear. Neck: Supple, with full range of motion. No jugular venous distention. Trachea midline. Respiratory:  Normal respiratory effort. Clear to auscultation, bilaterally without Rales/Wheezes/Rhonchi. Cardiovascular:  Regular rate and rhythm with normal S1/S2 without murmurs, rubs or gallops. Abdomen: Soft, non-tender, non-distended with normal bowel sounds.   Musculoskeletal:  No clubbing, cyanosis or edema ABDOMEN/PELVIS:   No acute abdominopelvic abnormality. Prostatomegaly. Nonspecific 6 mm right renal hyperdense lesion. This may be too small to accurately characterize by renal protocol CT. Given the patient's age, consider follow-up imaging as indicated      LUMBAR SPINE:   Suspected acute mild to moderate inferior endplate compression fracture of L1 with minimal retropulsion causing no spinal canal compromise. Age-indeterminate, favored subacute to chronic inferior endplate compression fracture of L4. Extensive degenerative disc disease. INCIDENTAL FINDINGS:             Consults:     IP CONSULT TO NEUROSURGERY  IP CONSULT TO HOSPITALIST  IP CONSULT TO PHARMACY  IP CONSULT TO CASE MANAGEMENT  IP CONSULT TO SOCIAL WORK    Disposition:  SNF     Condition at Discharge: Stable    Discharge Instructions/Follow-up: Will need outpatient kyphoplasty  Follow-up with primary care physician once discharged from skilled nursing facility    Code Status:  Full Code    Activity: activity as tolerated    Diet: regular diet      Discharge Medications:     Current Discharge Medication List           Details   lidocaine (LIDODERM) 5 % Place 1 patch onto the skin daily for 10 days 12 hours on, 12 hours off.   Qty: 10 patch, Refills: 0              Details   ELIQUIS 2.5 MG TABS tablet TAKE 1 TABLET BY MOUTH TWICE A DAY  Qty: 60 tablet, Refills: 0      furosemide (LASIX) 40 MG tablet Take 1 tablet by mouth 2 times daily  Qty: 200 tablet, Refills: 3      metoprolol succinate (TOPROL XL) 25 MG extended release tablet TAKE 1 TABLET EVERY DAY  Qty: 90 tablet, Refills: 3    Associated Diagnoses: Essential hypertension; Atrial fibrillation, unspecified type (HCC)      timolol (TIMOPTIC-XE) 0.5 % ophthalmic gel-forming Place 1 drop into both eyes 2 times daily       finasteride (PROSCAR) 5 MG tablet Take 5 mg by mouth daily       simvastatin (ZOCOR) 40 MG tablet TAKE 1 TABLET IN THE EVENING  Refills: 2 Time Spent on discharge is more than 30 minutes in the examination, evaluation, counseling and review of medications and discharge plan. Signed:    Pelon Aldana MD   4/5/2021      Thank you Surendra Randall MD for the opportunity to be involved in this patient's care. If you have any questions or concerns please feel free to contact me at [phone].

## 2021-04-06 NOTE — PROGRESS NOTES
Physician Progress Note      PATIENT:               Prakash Charles  CSN #:                  [id number]  :                       1921  ADMIT DATE:       2021 1:56 PM  Sheri José DATE:        2021 6:14 PM  RESPONDING  PROVIDER #:        Roosevelt Granados MD        QUERY TEXT:    Stage of Chronic Kidney Disease: Please provide further specificity, if known. Clinical indicators include: ckd, bun, creatinine, gfr  Options provided:  -- Chronic kidney disease stage 1  -- Chronic kidney disease stage 2  -- Chronic kidney disease stage 3  -- Chronic kidney disease stage 3a  -- Chronic kidney disease stage 3b  -- Chronic kidney disease stage 4  -- Chronic kidney disease stage 5  -- Chronic kidney disease stage 5, requiring dialysis  -- End stage renal disease  -- Other - I will add my own diagnosis  -- Disagree - Not applicable / Not valid  -- Disagree - Clinically Unable to determine / Unknown        PROVIDER RESPONSE TEXT:    The patient has chronic kidney disease stage 3. QUERY TEXT:    Pt admitted with  and has CHF documented. If possible, please document in   progress notes and discharge summary further specificity regarding the type   and acuity of CHF:      The medical record reflects the following:  Risk Factors: 79 y/o with a Hx of CHF  Clinical Indicators: Per Dr. Charmayne Brunner 2020: \"Acute on chronic diastolic CHF. \"  Echo 2020:  Left ventricular cavity size is normal. There is mild o   moderate concentric  left ventricular hypertrophy. Overall left ventricular systolic function  appears normal with an ejection fraction of 55-60%. No regional wall motion  abnormalities are noted. Indeterminate diastolic function.   Treatment: Metoprolol 25mg daily  Options provided:  -- Chronic Systolic and Diastolic CHF  -- Chronic Diastolic CHF/HFpEF  -- Other - I will add my own diagnosis  -- Disagree - Not applicable / Not valid  -- Disagree - Clinically unable to determine / Unknown  -- Refer to Clinical Documentation Reviewer    PROVIDER RESPONSE TEXT:    This patient has chronic systolic and diastolic CHF.     Query created by: Taryn Flynn on 4/5/2021 6:33 AM      Electronically signed by:  Aida Hale MD 4/6/2021 6:40 PM

## 2021-04-07 NOTE — TELEPHONE ENCOUNTER
Spoke with Nicole Little she informed me pt went to [facility] ER was dx: with a lumbar fracture and is now at [facility] in [place].

## 2021-04-13 ENCOUNTER — TELEPHONE (OUTPATIENT)
Dept: CARDIOLOGY CLINIC | Age: 86
End: 2021-04-13

## 2021-04-13 NOTE — TELEPHONE ENCOUNTER
Scottie Flanagan RN with [facility] called stating that the patient's HR is high 117 and the patient's B/P before meds was 132/74. Please call Scottie Flanagan back at [phone] to advise.

## 2021-04-14 ENCOUNTER — HOSPITAL ENCOUNTER (EMERGENCY)
Age: 86
Discharge: ANOTHER HOSPITAL | End: 2021-04-14
Attending: EMERGENCY MEDICINE
Payer: MEDICARE

## 2021-04-14 ENCOUNTER — HOSPITAL ENCOUNTER (OUTPATIENT)
Dept: INTERVENTIONAL RADIOLOGY/VASCULAR | Age: 86
Discharge: HOME | End: 2021-04-14
Payer: MEDICARE

## 2021-04-14 VITALS
OXYGEN SATURATION: 100 % | SYSTOLIC BLOOD PRESSURE: 126 MMHG | DIASTOLIC BLOOD PRESSURE: 88 MMHG | BODY MASS INDEX: 21.4 KG/M2 | TEMPERATURE: 98.7 F | RESPIRATION RATE: 20 BRPM | HEIGHT: 72 IN | WEIGHT: 158 LBS | HEART RATE: 113 BPM

## 2021-04-14 VITALS — HEIGHT: 71 IN | BODY MASS INDEX: 22.04 KG/M2

## 2021-04-14 DIAGNOSIS — S32.010A CLOSED COMPRESSION FRACTURE OF BODY OF L1 VERTEBRA (HCC): Primary | ICD-10-CM

## 2021-04-14 LAB
HCT VFR BLD CALC: 31.7 % (ref 40.5–52.5)
HEMOGLOBIN: 10.9 G/DL (ref 13.5–17.5)
INR BLD: 1.34 (ref 0.86–1.14)
MCH RBC QN AUTO: 30.9 PG (ref 26–34)
MCHC RBC AUTO-ENTMCNC: 34.5 G/DL (ref 31–36)
MCV RBC AUTO: 89.5 FL (ref 80–100)
PDW BLD-RTO: 15.3 % (ref 12.4–15.4)
PLATELET # BLD: 281 K/UL (ref 135–450)
PMV BLD AUTO: 7.9 FL (ref 5–10.5)
PROTHROMBIN TIME: 15.6 SEC (ref 10–13.2)
RBC # BLD: 3.55 M/UL (ref 4.2–5.9)
WBC # BLD: 5.9 K/UL (ref 4–11)

## 2021-04-14 PROCEDURE — 85027 COMPLETE CBC AUTOMATED: CPT

## 2021-04-14 PROCEDURE — 99283 EMERGENCY DEPT VISIT LOW MDM: CPT

## 2021-04-14 PROCEDURE — 93005 ELECTROCARDIOGRAM TRACING: CPT

## 2021-04-14 PROCEDURE — 85610 PROTHROMBIN TIME: CPT

## 2021-04-14 ASSESSMENT — PAIN DESCRIPTION - LOCATION: LOCATION: BACK

## 2021-04-14 NOTE — ED PROVIDER NOTES
icterus  Neck:  Supple, no JVD, no signs of injury  Cardiovascular:  Regular, no rubs  Thorax & Lungs:  No accessory muscle usage, clear  Abdomen:  Soft, non distended, bowel sounds present, NT  Back:  No deformity  Genitalia:  Deferred  Rectal:  Deferred  Extremities:  No cyanosis, no edema  Skin:  Warm, dry  Neurologic:  Alert, no slurred speech, df/pf ok, light touch sensation intact, normal coordination of arms, gait not tested  Psychiatric:  Affect appropriate    DIAGNOSTIC RESULTS:  None     ED COURSE:  Uneventful     PROCEDURES:  None    CRITICAL CARE:  None    CONSULTATIONS:  Dr Gail Medina: Raul Angel is a 80 y.o. male who presented because of back pain. He has a known L1 compression fracture. He is scheduled for kyphoplasty procedure today and inadvertently was brought to our facility instead of [facility]. to have his procedure done. I contacted his provider at [facility] and that he will be expecting the patient as soon as we are able to get him there to complete the treatment plan. Nursing staff here checked with the [facility] ED and they are aware of this plan as well. Transport center facilitated the transfer and communication. FINAL IMPRESSION:    1. Closed compression fracture of body of L1 vertebra (HCC)        (Please note that I used voice recognition software to generate this note.   Occasionally words are mistranscribed despite my efforts to edit errors.)        Nehemias Claros MD  04/14/21 9409

## 2021-04-15 ENCOUNTER — HOSPITAL ENCOUNTER (OUTPATIENT)
Dept: INTERVENTIONAL RADIOLOGY/VASCULAR | Age: 86
Discharge: HOME | End: 2021-04-15
Payer: MEDICARE

## 2021-04-15 DIAGNOSIS — S32.010A CLOSED COMPRESSION FRACTURE OF BODY OF L1 VERTEBRA (HCC): ICD-10-CM

## 2021-04-15 LAB
EKG ATRIAL RATE: 113 BPM
EKG DIAGNOSIS: NORMAL
EKG Q-T INTERVAL: 410 MS
EKG QRS DURATION: 178 MS
EKG QTC CALCULATION (BAZETT): 562 MS
EKG R AXIS: -88 DEGREES
EKG T AXIS: -14 DEGREES
EKG VENTRICULAR RATE: 113 BPM

## 2021-04-15 PROCEDURE — 99152 MOD SED SAME PHYS/QHP 5/>YRS: CPT | Performed by: RADIOLOGY

## 2021-04-15 PROCEDURE — C1713 ANCHOR/SCREW BN/BN,TIS/BN: HCPCS

## 2021-04-15 PROCEDURE — C1894 INTRO/SHEATH, NON-LASER: HCPCS

## 2021-04-15 PROCEDURE — 6360000002 HC RX W HCPCS

## 2021-04-15 PROCEDURE — 22514 PERQ VERTEBRAL AUGMENTATION: CPT | Performed by: RADIOLOGY

## 2021-04-15 NOTE — H&P
freely  Circulation:  2 - BP+/- 20mmHg of normal  Consciousness:  2 - Fully awake  Oxygen Saturation (color):  2 - Able to maintain oxygen saturation >92% on room air    Sedation : Moderate sedation planned

## 2021-04-15 NOTE — PROCEDURES
IR Brief Postoperative Note    Doug Hall  YOB: 1921  [id number]    Pre-operative Diagnosis: L1 compression fracture    Post-operative Diagnosis: Same    Procedure: L1 kyphoplasty    Anesthesia: moderate    Surgeons/Assistants: niranjan    Estimated Blood Loss: Minimal    Complications: none    Specimens: were not obtained    See full procedure dictation to follow      Marisol Talavera MD  4/15/2021

## 2021-05-02 ENCOUNTER — HOSPITAL ENCOUNTER (INPATIENT)
Age: 86
LOS: 3 days | Discharge: NURSING, REHAB OR LONG-TERM CARE | DRG: 682 | End: 2021-05-06
Attending: STUDENT IN AN ORGANIZED HEALTH CARE EDUCATION/TRAINING PROGRAM | Admitting: INTERNAL MEDICINE
Payer: MEDICARE

## 2021-05-02 ENCOUNTER — APPOINTMENT (OUTPATIENT)
Dept: GENERAL RADIOLOGY | Age: 86
DRG: 682 | End: 2021-05-02
Payer: MEDICARE

## 2021-05-02 DIAGNOSIS — R79.89 ELEVATED BRAIN NATRIURETIC PEPTIDE (BNP) LEVEL: ICD-10-CM

## 2021-05-02 DIAGNOSIS — I95.9 HYPOTENSION, UNSPECIFIED HYPOTENSION TYPE: ICD-10-CM

## 2021-05-02 DIAGNOSIS — I21.4 NSTEMI (NON-ST ELEVATION MYOCARDIAL INFARCTION) (HCC): Primary | ICD-10-CM

## 2021-05-02 LAB
ANION GAP SERPL CALCULATED.3IONS-SCNC: 16 MMOL/L (ref 3–16)
BASOPHILS ABSOLUTE: 0 K/UL (ref 0–0.2)
BASOPHILS RELATIVE PERCENT: 0.1 %
BUN BLDV-MCNC: 60 MG/DL (ref 7–20)
CALCIUM SERPL-MCNC: 9.2 MG/DL (ref 8.3–10.6)
CHLORIDE BLD-SCNC: 100 MMOL/L (ref 99–110)
CO2: 25 MMOL/L (ref 21–32)
CREAT SERPL-MCNC: 2.8 MG/DL (ref 0.8–1.3)
EOSINOPHILS ABSOLUTE: 0.1 K/UL (ref 0–0.6)
EOSINOPHILS RELATIVE PERCENT: 1.3 %
GFR AFRICAN AMERICAN: 25
GFR NON-AFRICAN AMERICAN: 21
GLUCOSE BLD-MCNC: 168 MG/DL (ref 70–99)
HCT VFR BLD CALC: 34 % (ref 40.5–52.5)
HEMOGLOBIN: 11.2 G/DL (ref 13.5–17.5)
INR BLD: 1.6 (ref 0.86–1.14)
LYMPHOCYTES ABSOLUTE: 0.9 K/UL (ref 1–5.1)
LYMPHOCYTES RELATIVE PERCENT: 12.7 %
MCH RBC QN AUTO: 30.4 PG (ref 26–34)
MCHC RBC AUTO-ENTMCNC: 33 G/DL (ref 31–36)
MCV RBC AUTO: 92.1 FL (ref 80–100)
MONOCYTES ABSOLUTE: 1.1 K/UL (ref 0–1.3)
MONOCYTES RELATIVE PERCENT: 14.5 %
NEUTROPHILS ABSOLUTE: 5.3 K/UL (ref 1.7–7.7)
NEUTROPHILS RELATIVE PERCENT: 71.4 %
PDW BLD-RTO: 16.2 % (ref 12.4–15.4)
PLATELET # BLD: 206 K/UL (ref 135–450)
PMV BLD AUTO: 8.5 FL (ref 5–10.5)
POTASSIUM REFLEX MAGNESIUM: 3.7 MMOL/L (ref 3.5–5.1)
PRO-BNP: 7490 PG/ML (ref 0–449)
PROTHROMBIN TIME: 18.7 SEC (ref 10–13.2)
RBC # BLD: 3.7 M/UL (ref 4.2–5.9)
SODIUM BLD-SCNC: 141 MMOL/L (ref 136–145)
TROPONIN: 0.05 NG/ML
TROPONIN: 0.06 NG/ML
WBC # BLD: 7.4 K/UL (ref 4–11)

## 2021-05-02 PROCEDURE — 71045 X-RAY EXAM CHEST 1 VIEW: CPT

## 2021-05-02 PROCEDURE — 36415 COLL VENOUS BLD VENIPUNCTURE: CPT

## 2021-05-02 PROCEDURE — G0378 HOSPITAL OBSERVATION PER HR: HCPCS

## 2021-05-02 PROCEDURE — 93005 ELECTROCARDIOGRAM TRACING: CPT | Performed by: INTERNAL MEDICINE

## 2021-05-02 PROCEDURE — 96360 HYDRATION IV INFUSION INIT: CPT

## 2021-05-02 PROCEDURE — 2580000003 HC RX 258: Performed by: INTERNAL MEDICINE

## 2021-05-02 PROCEDURE — 83880 ASSAY OF NATRIURETIC PEPTIDE: CPT

## 2021-05-02 PROCEDURE — 99284 EMERGENCY DEPT VISIT MOD MDM: CPT

## 2021-05-02 PROCEDURE — 80048 BASIC METABOLIC PNL TOTAL CA: CPT

## 2021-05-02 PROCEDURE — 84484 ASSAY OF TROPONIN QUANT: CPT

## 2021-05-02 PROCEDURE — 85025 COMPLETE CBC W/AUTO DIFF WBC: CPT

## 2021-05-02 PROCEDURE — 6370000000 HC RX 637 (ALT 250 FOR IP): Performed by: INTERNAL MEDICINE

## 2021-05-02 PROCEDURE — 2580000003 HC RX 258: Performed by: STUDENT IN AN ORGANIZED HEALTH CARE EDUCATION/TRAINING PROGRAM

## 2021-05-02 PROCEDURE — 85610 PROTHROMBIN TIME: CPT

## 2021-05-02 RX ORDER — ATORVASTATIN CALCIUM 40 MG/1
40 TABLET, FILM COATED ORAL NIGHTLY
Status: DISCONTINUED | OUTPATIENT
Start: 2021-05-02 | End: 2021-05-06 | Stop reason: HOSPADM

## 2021-05-02 RX ORDER — POTASSIUM CHLORIDE 7.45 MG/ML
10 INJECTION INTRAVENOUS PRN
Status: DISCONTINUED | OUTPATIENT
Start: 2021-05-02 | End: 2021-05-06 | Stop reason: HOSPADM

## 2021-05-02 RX ORDER — MAGNESIUM SULFATE IN WATER 40 MG/ML
2000 INJECTION, SOLUTION INTRAVENOUS PRN
Status: DISCONTINUED | OUTPATIENT
Start: 2021-05-02 | End: 2021-05-06 | Stop reason: HOSPADM

## 2021-05-02 RX ORDER — ACETAMINOPHEN 325 MG/1
650 TABLET ORAL EVERY 6 HOURS PRN
Status: DISCONTINUED | OUTPATIENT
Start: 2021-05-02 | End: 2021-05-06 | Stop reason: HOSPADM

## 2021-05-02 RX ORDER — ACETAMINOPHEN 650 MG/1
650 SUPPOSITORY RECTAL EVERY 6 HOURS PRN
Status: DISCONTINUED | OUTPATIENT
Start: 2021-05-02 | End: 2021-05-06 | Stop reason: HOSPADM

## 2021-05-02 RX ORDER — SODIUM CHLORIDE 9 MG/ML
1000 INJECTION, SOLUTION INTRAVENOUS ONCE
Status: DISCONTINUED | OUTPATIENT
Start: 2021-05-02 | End: 2021-05-02

## 2021-05-02 RX ORDER — SODIUM CHLORIDE 9 MG/ML
25 INJECTION, SOLUTION INTRAVENOUS PRN
Status: DISCONTINUED | OUTPATIENT
Start: 2021-05-02 | End: 2021-05-06 | Stop reason: HOSPADM

## 2021-05-02 RX ORDER — SODIUM CHLORIDE 0.9 % (FLUSH) 0.9 %
10 SYRINGE (ML) INJECTION PRN
Status: DISCONTINUED | OUTPATIENT
Start: 2021-05-02 | End: 2021-05-06 | Stop reason: HOSPADM

## 2021-05-02 RX ORDER — 0.9 % SODIUM CHLORIDE 0.9 %
1000 INTRAVENOUS SOLUTION INTRAVENOUS ONCE
Status: COMPLETED | OUTPATIENT
Start: 2021-05-02 | End: 2021-05-02

## 2021-05-02 RX ORDER — METOPROLOL SUCCINATE 25 MG/1
25 TABLET, EXTENDED RELEASE ORAL DAILY
Status: DISCONTINUED | OUTPATIENT
Start: 2021-05-03 | End: 2021-05-04

## 2021-05-02 RX ORDER — ONDANSETRON 2 MG/ML
4 INJECTION INTRAMUSCULAR; INTRAVENOUS EVERY 6 HOURS PRN
Status: DISCONTINUED | OUTPATIENT
Start: 2021-05-02 | End: 2021-05-06 | Stop reason: HOSPADM

## 2021-05-02 RX ORDER — PROMETHAZINE HYDROCHLORIDE 12.5 MG/1
12.5 TABLET ORAL EVERY 6 HOURS PRN
Status: DISCONTINUED | OUTPATIENT
Start: 2021-05-02 | End: 2021-05-06 | Stop reason: HOSPADM

## 2021-05-02 RX ORDER — FAMOTIDINE 20 MG/1
20 TABLET, FILM COATED ORAL DAILY PRN
Status: DISCONTINUED | OUTPATIENT
Start: 2021-05-02 | End: 2021-05-06 | Stop reason: HOSPADM

## 2021-05-02 RX ORDER — SODIUM CHLORIDE 0.9 % (FLUSH) 0.9 %
10 SYRINGE (ML) INJECTION EVERY 12 HOURS SCHEDULED
Status: DISCONTINUED | OUTPATIENT
Start: 2021-05-02 | End: 2021-05-06 | Stop reason: HOSPADM

## 2021-05-02 RX ADMIN — SODIUM CHLORIDE 1000 ML: 0.9 INJECTION, SOLUTION INTRAVENOUS at 19:45

## 2021-05-02 RX ADMIN — Medication 10 ML: at 23:14

## 2021-05-02 RX ADMIN — ATORVASTATIN CALCIUM 40 MG: 40 TABLET, FILM COATED ORAL at 23:13

## 2021-05-02 RX ADMIN — APIXABAN 2.5 MG: 2.5 TABLET, FILM COATED ORAL at 23:13

## 2021-05-02 ASSESSMENT — PAIN SCALES - GENERAL: PAINLEVEL_OUTOF10: 0

## 2021-05-02 ASSESSMENT — ENCOUNTER SYMPTOMS
NAUSEA: 0
COUGH: 1
TROUBLE SWALLOWING: 0
CHOKING: 1
VOMITING: 0
SHORTNESS OF BREATH: 1

## 2021-05-02 NOTE — ED PROVIDER NOTES
[address] ENCOUNTER          ATTENDING PHYSICIAN NOTE       Date of evaluation: 5/2/2021    Chief Complaint     Chest Pain    History of Present Illness     Franki Collet is a 80 y.o. male who presents 55-year-old male brought in by EMS for chest pain. Patient has a history of atrial fibrillation on Eliquis, NSTEMI and CKD stage III, per EMS he experienced acute onset of chest pain at 1800, was tachycardic to the 140s/150s for them and hypotensive, received aspirin and nitroglycerin in route without change in chest pain which patient describes as midsternal although he states he was eating prior to onset and feels as though something became stuck in his throat, has been coughing since then and was coughing for EMS and on arrival to the emergency department is coughing and gasping, is saturating well on room air but is hypotensive and significantly tachycardic at 150, appears to be regular. EMS EKG concerning for possible concordant ST elevation in V3 and V4 with discordant ST depression in lead I however repeat EKG on arrival in the emergency department looks very similar to patient's previous that also demonstrated right bundle branch block. Review of Systems     Review of Systems   Constitutional: Positive for fatigue. Negative for fever. HENT: Negative for congestion and trouble swallowing. Respiratory: Positive for cough, choking and shortness of breath. Cardiovascular: Positive for chest pain and palpitations. Gastrointestinal: Negative for nausea and vomiting. Musculoskeletal: Negative for neck pain and neck stiffness. Skin: Negative for rash and wound. Neurological: Negative for syncope and light-headedness. Hematological: Does not bruise/bleed easily. Psychiatric/Behavioral: Negative for confusion.        Past Medical, Surgical, Family, and Social History     He has a past medical history of Hypertension and SVT (supraventricular tachycardia) ([facility] He has a past surgical history that includes Prostate surgery; sinus surgery; Carpal tunnel release; and IR KYPHOPLASTY LUMBAR 1 VERTEBRAL BODY (4/15/2021). His family history includes Heart Disease in his sister; Kidney Disease in his sister. He reports that he quit smoking about 26 years ago. His smoking use included pipe and cigars. He has never used smokeless tobacco. He reports current alcohol use. He reports that he does not use drugs. Medications     Previous Medications    ELIQUIS 2.5 MG TABS TABLET    TAKE 1 TABLET BY MOUTH TWICE A DAY    FINASTERIDE (PROSCAR) 5 MG TABLET    Take 5 mg by mouth daily     FUROSEMIDE (LASIX) 40 MG TABLET    Take 1 tablet by mouth 2 times daily    METOPROLOL SUCCINATE (TOPROL XL) 25 MG EXTENDED RELEASE TABLET    TAKE 1 TABLET EVERY DAY    SIMVASTATIN (ZOCOR) 40 MG TABLET    TAKE 1 TABLET IN THE EVENING    TIMOLOL (TIMOPTIC-XE) 0.5 % OPHTHALMIC GEL-FORMING    Place 1 drop into both eyes 2 times daily        Allergies     He has No Known Allergies. Physical Exam     INITIAL VITALS: BP: (!) 110/92,  , Pulse: 130, Resp: (!) 31,     Physical Exam  Constitutional:       General: He is in acute distress. Appearance: Normal appearance. He is not ill-appearing, toxic-appearing or diaphoretic. HENT:      Head: Normocephalic and atraumatic. Right Ear: External ear normal.      Left Ear: External ear normal.      Mouth/Throat:      Mouth: Mucous membranes are moist.      Pharynx: Oropharynx is clear. No oropharyngeal exudate or posterior oropharyngeal erythema. Eyes:      General: No scleral icterus. Right eye: No discharge. Left eye: No discharge. Conjunctiva/sclera: Conjunctivae normal.   Neck:      Musculoskeletal: Normal range of motion. No neck rigidity. Cardiovascular:      Rate and Rhythm: Regular rhythm. Tachycardia present. Pulses: Normal pulses.       Comments: Bedside ultrasound shows collapsibility of IVC   Pulmonary: Effort: Respiratory distress (Tachypnea) present. Comments: Saturating well on room air  Chest:      Chest wall: No tenderness. Abdominal:      General: There is no distension. Palpations: Abdomen is soft. Tenderness: There is no abdominal tenderness. There is no guarding. Musculoskeletal: Normal range of motion. Right lower leg: No edema. Left lower leg: No edema. Skin:     General: Skin is warm and dry. Capillary Refill: Capillary refill takes less than 2 seconds. Neurological:      General: No focal deficit present. Mental Status: He is alert and oriented to person, place, and time. Mental status is at baseline. Psychiatric:         Mood and Affect: Mood normal.         Diagnostic Results     EKG   EKG Interpretation    Interpreted by emergency department physician    Rhythm: paroxismal supraventricular tachycardia  Rate: 150-160  Axis: left  Ectopy: none  Conduction: right bundle branch block (complete)  ST Segments: nonspecific changes  T Waves: non specific changes    Clinical Impression: supraventricular tachycardia    Delores Lurdesdaniela      RADIOLOGY:  XR CHEST PORTABLE   Final Result      Cardiomegaly with mild pulmonary vascular congestion.           LABS:   Results for orders placed or performed during the hospital encounter of 05/02/21   CBC Auto Differential   Result Value Ref Range    WBC 7.4 4.0 - 11.0 K/uL    RBC 3.70 (L) 4.20 - 5.90 M/uL    Hemoglobin 11.2 (L) 13.5 - 17.5 g/dL    Hematocrit 34.0 (L) 40.5 - 52.5 %    MCV 92.1 80.0 - 100.0 fL    MCH 30.4 26.0 - 34.0 pg    MCHC 33.0 31.0 - 36.0 g/dL    RDW 16.2 (H) 12.4 - 15.4 %    Platelets 203 159 - 105 K/uL    MPV 8.5 5.0 - 10.5 fL    Neutrophils % 71.4 %    Lymphocytes % 12.7 %    Monocytes % 14.5 %    Eosinophils % 1.3 %    Basophils % 0.1 %    Neutrophils Absolute 5.3 1.7 - 7.7 K/uL    Lymphocytes Absolute 0.9 (L) 1.0 - 5.1 K/uL    Monocytes Absolute 1.1 0.0 - 1.3 K/uL    Eosinophils Absolute 0.1 0.0 - 0.6 x-ray for possible aspiration. Patient's laboratory work notable for a troponin of 0.06 and a BNP elevated to 7500, also noted to have a creatinine of 2.8 although it appears that patient's creatinine has been slowly uptrending over the last several values. On repeat evaluation patient pulse rate remains within normal limits and his blood pressure has significantly improved. We will plan for repeat troponin every 6 hours and admission to medicine for continued evaluation/management, chest x-ray was without focal consolidation or effusion to suggest infection or aspiration, I have personally spoke with the accepting medical provider. Clinical Impression     1. NSTEMI (non-ST elevation myocardial infarction) ([facility]    2. Hypotension, unspecified hypotension type    3. Elevated brain natriuretic peptide (BNP) level        Disposition     PATIENT REFERRED TO:  No follow-up provider specified.     DISCHARGE MEDICATIONS:  New Prescriptions    No medications on file       Adryan Chavis MD  05/02/21 2004

## 2021-05-03 LAB
A/G RATIO: 0.7 (ref 1.1–2.2)
ALBUMIN SERPL-MCNC: 2.8 G/DL (ref 3.4–5)
ALP BLD-CCNC: 135 U/L (ref 40–129)
ALT SERPL-CCNC: 43 U/L (ref 10–40)
ANION GAP SERPL CALCULATED.3IONS-SCNC: 15 MMOL/L (ref 3–16)
AST SERPL-CCNC: 39 U/L (ref 15–37)
BASOPHILS ABSOLUTE: 0 K/UL (ref 0–0.2)
BASOPHILS RELATIVE PERCENT: 0.2 %
BILIRUB SERPL-MCNC: 1 MG/DL (ref 0–1)
BUN BLDV-MCNC: 65 MG/DL (ref 7–20)
CALCIUM SERPL-MCNC: 8.9 MG/DL (ref 8.3–10.6)
CHLORIDE BLD-SCNC: 104 MMOL/L (ref 99–110)
CHOLESTEROL, TOTAL: 85 MG/DL (ref 0–199)
CO2: 21 MMOL/L (ref 21–32)
CREAT SERPL-MCNC: 2.8 MG/DL (ref 0.8–1.3)
EKG ATRIAL RATE: 125 BPM
EKG DIAGNOSIS: NORMAL
EKG Q-T INTERVAL: 338 MS
EKG QRS DURATION: 170 MS
EKG QTC CALCULATION (BAZETT): 487 MS
EKG R AXIS: -76 DEGREES
EKG T AXIS: -21 DEGREES
EKG VENTRICULAR RATE: 125 BPM
EOSINOPHILS ABSOLUTE: 0 K/UL (ref 0–0.6)
EOSINOPHILS RELATIVE PERCENT: 0.3 %
GFR AFRICAN AMERICAN: 25
GFR NON-AFRICAN AMERICAN: 21
GLOBULIN: 4.2 G/DL
GLUCOSE BLD-MCNC: 105 MG/DL (ref 70–99)
GLUCOSE BLD-MCNC: 99 MG/DL (ref 70–99)
HCT VFR BLD CALC: 33.3 % (ref 40.5–52.5)
HDLC SERPL-MCNC: 33 MG/DL (ref 40–60)
HEMOGLOBIN: 11.1 G/DL (ref 13.5–17.5)
LDL CHOLESTEROL CALCULATED: 41 MG/DL
LYMPHOCYTES ABSOLUTE: 0.3 K/UL (ref 1–5.1)
LYMPHOCYTES RELATIVE PERCENT: 3.9 %
MAGNESIUM: 2.4 MG/DL (ref 1.8–2.4)
MCH RBC QN AUTO: 30.5 PG (ref 26–34)
MCHC RBC AUTO-ENTMCNC: 33.2 G/DL (ref 31–36)
MCV RBC AUTO: 91.8 FL (ref 80–100)
MONOCYTES ABSOLUTE: 0.5 K/UL (ref 0–1.3)
MONOCYTES RELATIVE PERCENT: 7.1 %
NEUTROPHILS ABSOLUTE: 6.7 K/UL (ref 1.7–7.7)
NEUTROPHILS RELATIVE PERCENT: 88.5 %
PDW BLD-RTO: 16 % (ref 12.4–15.4)
PERFORMED ON: NORMAL
PLATELET # BLD: 191 K/UL (ref 135–450)
PMV BLD AUTO: 8.7 FL (ref 5–10.5)
POTASSIUM REFLEX MAGNESIUM: 3.8 MMOL/L (ref 3.5–5.1)
RBC # BLD: 3.63 M/UL (ref 4.2–5.9)
SODIUM BLD-SCNC: 140 MMOL/L (ref 136–145)
TOTAL PROTEIN: 7 G/DL (ref 6.4–8.2)
TRIGL SERPL-MCNC: 56 MG/DL (ref 0–150)
TROPONIN: 0.05 NG/ML
TROPONIN: 0.06 NG/ML
TSH REFLEX FT4: 1.05 UIU/ML (ref 0.27–4.2)
VITAMIN D 25-HYDROXY: 26.7 NG/ML
VLDLC SERPL CALC-MCNC: 11 MG/DL
WBC # BLD: 7.6 K/UL (ref 4–11)

## 2021-05-03 PROCEDURE — APPSS30 APP SPLIT SHARED TIME 16-30 MINUTES: Performed by: NURSE PRACTITIONER

## 2021-05-03 PROCEDURE — 84484 ASSAY OF TROPONIN QUANT: CPT

## 2021-05-03 PROCEDURE — 84443 ASSAY THYROID STIM HORMONE: CPT

## 2021-05-03 PROCEDURE — 6370000000 HC RX 637 (ALT 250 FOR IP): Performed by: INTERNAL MEDICINE

## 2021-05-03 PROCEDURE — 93005 ELECTROCARDIOGRAM TRACING: CPT | Performed by: NURSE PRACTITIONER

## 2021-05-03 PROCEDURE — 1200000000 HC SEMI PRIVATE

## 2021-05-03 PROCEDURE — 2580000003 HC RX 258: Performed by: INTERNAL MEDICINE

## 2021-05-03 PROCEDURE — 82306 VITAMIN D 25 HYDROXY: CPT

## 2021-05-03 PROCEDURE — 85025 COMPLETE CBC W/AUTO DIFF WBC: CPT

## 2021-05-03 PROCEDURE — 93010 ELECTROCARDIOGRAM REPORT: CPT | Performed by: INTERNAL MEDICINE

## 2021-05-03 PROCEDURE — 80061 LIPID PANEL: CPT

## 2021-05-03 PROCEDURE — 93325 DOPPLER ECHO COLOR FLOW MAPG: CPT

## 2021-05-03 PROCEDURE — 93320 DOPPLER ECHO COMPLETE: CPT

## 2021-05-03 PROCEDURE — 36415 COLL VENOUS BLD VENIPUNCTURE: CPT

## 2021-05-03 PROCEDURE — 93308 TTE F-UP OR LMTD: CPT

## 2021-05-03 PROCEDURE — 83735 ASSAY OF MAGNESIUM: CPT

## 2021-05-03 PROCEDURE — 80053 COMPREHEN METABOLIC PANEL: CPT

## 2021-05-03 PROCEDURE — 99223 1ST HOSP IP/OBS HIGH 75: CPT | Performed by: INTERNAL MEDICINE

## 2021-05-03 RX ORDER — GUAIFENESIN/DEXTROMETHORPHAN 100-10MG/5
5 SYRUP ORAL EVERY 4 HOURS PRN
Status: DISCONTINUED | OUTPATIENT
Start: 2021-05-03 | End: 2021-05-06 | Stop reason: HOSPADM

## 2021-05-03 RX ADMIN — APIXABAN 2.5 MG: 2.5 TABLET, FILM COATED ORAL at 20:57

## 2021-05-03 RX ADMIN — METOPROLOL SUCCINATE 25 MG: 25 TABLET, EXTENDED RELEASE ORAL at 09:32

## 2021-05-03 RX ADMIN — Medication 10 ML: at 09:33

## 2021-05-03 RX ADMIN — Medication 10 ML: at 20:57

## 2021-05-03 RX ADMIN — APIXABAN 2.5 MG: 2.5 TABLET, FILM COATED ORAL at 09:32

## 2021-05-03 RX ADMIN — ATORVASTATIN CALCIUM 40 MG: 40 TABLET, FILM COATED ORAL at 20:57

## 2021-05-03 RX ADMIN — GUAIFENESIN AND DEXTROMETHORPHAN 5 ML: 100; 10 SYRUP ORAL at 21:32

## 2021-05-03 ASSESSMENT — PAIN SCALES - GENERAL
PAINLEVEL_OUTOF10: 0

## 2021-05-03 NOTE — PROGRESS NOTES
Hospital Medicine Care  Progress Note      Chief complain; Chest Pain   Dyspnea          Subjective:     Feels well  No cp this am  No sob  No nausea, emesis   Review of Systems:     Review of Systems as mentioned above, other ros is negative. Objective:       Medications        Scheduled Meds:   sodium chloride flush  10 mL Intravenous 2 times per day    apixaban  2.5 mg Oral BID    metoprolol succinate  25 mg Oral Daily    atorvastatin  40 mg Oral Nightly     Continuous Infusions:   sodium chloride       PRN Meds:.sodium chloride flush, sodium chloride, potassium chloride, magnesium sulfate, promethazine **OR** ondansetron, famotidine, acetaminophen **OR** acetaminophen      Allergies  No Known Allergies      Vitals:    05/03/21 0043 05/03/21 0332 05/03/21 0412 05/03/21 0759   BP: 114/79 122/63  94/64   Pulse: 131 118  95   Resp: 16   18   Temp: 97.7 °F (36.5 °C) 97.9 °F (36.6 °C)  98.1 °F (36.7 °C)   TempSrc: Axillary Axillary  Oral   SpO2: 93% 94%  95%   Weight:   146 lb 9.6 oz (66.5 kg)    Height:             Physical exam:         Physical Exam  Constitutional:       Appearance: Normal appearance. HENT:      Head: Normocephalic and atraumatic. Cardiovascular:      Rate and Rhythm: Normal rate and regular rhythm. Pulses: Normal pulses. Pulmonary:      Effort: Pulmonary effort is normal.      Breath sounds: Normal breath sounds. Abdominal:      General: Abdomen is flat. Palpations: Abdomen is soft. Musculoskeletal: Normal range of motion. Skin:     General: Skin is warm and dry. Capillary Refill: Capillary refill takes less than 2 seconds. Neurological:      General: No focal deficit present. Mental Status: He is alert and oriented to person, place, and time.    Psychiatric:         Mood and Affect: Mood normal.         Behavior: Behavior normal.               Labs      Recent Labs     05/02/21  1851 05/03/21  0442   WBC 7.4 7.6   HGB 11.2* 11.1*   HCT 34.0* 33.3*  191   MCV 92.1 91.8        Recent Labs     05/02/21  1851 05/03/21  0441    140   K 3.7 3.8    104   CO2 25 21   BUN 60* 65*   CREATININE 2.8* 2.8*       Recent Labs     05/03/21  0441   AST 39*   ALT 43*   BILITOT 1.0   ALKPHOS 135*       No results for input(s): MG in the last 72 hours. Radiology  XR CHEST PORTABLE   Final Result      Cardiomegaly with mild pulmonary vascular congestion. Assessment and Plan:   Principal Problem:    NSTEMI (non-ST elevated myocardial infarction) ([facility]  Active Problems:    A-fib ([facility]    Acute kidney injury superimposed on CKD ([facility]    Chest pain  Resolved Problems:    * No resolved hospital problems. *     Plan  Chest pain   S/p ASA in ER. On BB and Statin. Cardiology consultation, likely conservative management given advanced age. Echo ordered. Afib  Controlled this am  Continue BB , on apixaban for AC. ROSALINO on CKD  Baseline 2.4  Nephrology consulted.     Harshal Bowens MD  5/3/2021

## 2021-05-03 NOTE — H&P
Hospital Medicine History & Physical      PCP: Tiffani Valdovinos MD    Date of Admission: 5/2/2021    Date of Service: Pt seen/examined on 5/2/2021  Pt seen/examined face to face on and admitted as observation with expected LOS less than two midnights but that can change depending on respose to medical therapy and clinical progress. Chief Complaint:    Chief Complaint   Patient presents with    Chest Pain        History Of Present Illness:      80 y.o. male who presented to [facility] with past medical history of A. fib on Eliquis, CKD stage III, SVT, hypertension presented to the ED from the facility due to chest pain. Patient reported that she was eating something and then choked started having some chest pain suddenly substernal no known alleviating or exacerbating factor no associated fever chills nausea vomiting abdominal pain shortness of breath. Patient reports that sharp and achy lasting for 30 minutes. When patient was at [facility] was noted to be tachycardic suspected SVT was given aspirin and nitro without change of chest pain. CODE STATUS discussed and the patient is a DNR CCA. Patient denied orthopnea, lower extremity swelling. Patient also uses a walker reportedly still able to use a walker with no decreased exertional dyspnea    Past Medical History:          Diagnosis Date    Hypertension     SVT (supraventricular tachycardia) (HCC)        Past Surgical History:          Procedure Laterality Date    CARPAL TUNNEL RELEASE      IR KYPHOPLASTY LUMBAR FIRST LEVEL  4/15/2021    IR KYPHOPLASTY LUMBAR FIRST LEVEL 4/15/2021 [facility]    PROSTATE SURGERY      SINUS SURGERY         Medications Prior to Admission:      Prior to Admission medications    Medication Sig Start Date End Date Taking?  Authorizing Provider   ELIQUIS 2.5 MG TABS tablet TAKE 1 TABLET BY MOUTH TWICE A DAY 4/1/21   LASHELL Deras - CNP   furosemide (LASIX) 40 MG tablet Take 1 tablet by mouth 2 times daily 12/4/20   Erica Terry MD   metoprolol succinate (TOPROL XL) 25 MG extended release tablet TAKE 1 TABLET EVERY DAY 7/8/20   Erica Terry MD   timolol (TIMOPTIC-XE) 0.5 % ophthalmic gel-forming Place 1 drop into both eyes 2 times daily     Historical Provider, MD   finasteride (PROSCAR) 5 MG tablet Take 5 mg by mouth daily  3/5/17   Historical Provider, MD   simvastatin (ZOCOR) 40 MG tablet TAKE 1 TABLET IN THE EVENING 1/5/17   Historical Provider, MD       Allergies:  Patient has no known allergies. Social History:          TOBACCO:   reports that he quit smoking about 26 years ago. His smoking use included pipe and cigars. He has never used smokeless tobacco.  ETOH:   reports current alcohol use. E-Cigarettes/Vaping Use     Questions Responses    E-Cigarette/Vaping Use     Start Date     Passive Exposure     Quit Date     Counseling Given     Comments             Family History:      Reviewed in detail         Problem Relation Age of Onset    Kidney Disease Sister     Heart Disease Sister        REVIEW OF SYSTEMS:     Constitutional:  No Fever, No Chills, No Night Sweats  ENT/Mouth:  No Nasal Congestion,  No Hoarseness, No new mouth lesion  Eyes:  No Eye Pain, No Redness, No Discharge  Cardiovascular: + Chest Pain, No Orthopnea, No Palpitations  Respiratory:  No Cough, No Sputum, No Dyspnea  Gastrointestinal: No Vomiting, No Diarrhea, No abdominal pain  Genitourinary: No Urinary Frequency, No Hematuria, No Urinary pain  Musculoskeletal:  No worsening Arthralgias, No worsening Myalgias  Skin:  No new Skin Lesions, No new skin rash  Neuro:  No new weakness, No new numbness.   Psych:  No suicial ideation, No Violence ideation    PHYSICAL EXAM PERFORMED:    /73   Pulse 117   Resp 20   Ht 5' 11\" (1.803 m)   Wt 158 lb (71.7 kg)   SpO2 100%   BMI 22.04 kg/m²     General appearance:  mild acute distress, appears older than stated age  HEENT:   atraumatic, sclera anicteric, Conjunctivae

## 2021-05-03 NOTE — CONSULTS
[address]   Cardiology    Dr. Miranda Arce MD, [facility] FNP APRN CVNP  Date: 5/3/2021  Admit Date: 5/2/2021       Reason for consultation: \"discuss EKG\"    CC:cp     History obtained from patient and medical record. Primary cardiologist:  Moises Chávez / Gokul     HPI: Cash Medrano is a 80 y.o. male with a past medical history of persistent afib. CRDIII, SVT HTN admitted with c/o substernal cp after choking while eating, no c/o fever chills cough  n/v or SOB lasting 30 minutes. Pt is DNR-CC   VSS afebrile SV02 95% RA     PMH: HFpEF, CAF s/p unsuccessful AVJ ablation and Biotronik dual chamber PPM in 07/2011, follows with Dr. Brionna Morley, CKD (BL cr 1.8 - 2.0). The RA lead appears to be dislodged and undersensing afib. Device was changed to VVI to minimize RV pacing (was pacing the RV 90%) in 02/2019. On eliquis and metoprolol     Echo 2017: LVEF 50%, moderate MR, nl RV, mild valve disease, RVSP 42  Nicki 2011: normal.    Echo 06/2019: mild LVH, LVEF 50-55%, indeterminate diastolic due to afib, mild RVD with mild dysfunction, moderate TR   Nicki 06/2019: normal   Patient was admitted 7/1 - 7/5/20 at [facility] for [facility]  ECG 7/1/20: A. fib with controlled ventricular response, right bundle branch block      Tests:   sCr 2.8 (range 1.6-2.8) neph following   Trop  0.06>0.05>0.06 >0.06   ProBNP 7490   TTE pending     Interval Hx: Today, he is resting quietly,no c/o cp voiced  No new complaints today. No major events overnight. Denies having chest pain, palpitations, shortness of breath, orthopnea/PND, cough, or dizziness at the time of this visit. Patient seen and examined. Clinical notes reviewed.  Telemetry reviewed / testing reviewed  >29 minutes   Pertinent labs, diagnostic, device, and imaging results reviewed as a part of this visit  EKG TTE stress test: any LHC/RHC reviewed     Past Medical History:  Past Medical History:   Diagnosis Date    Acute on chronic diastolic (congestive) heart failure ([facility] 07/01/2020    please see H&P and discharge summary of 7/5/20  for cardiological history.  Hypertension     SVT (supraventricular tachycardia) (HCC)         Past Surgical History:    has a past surgical history that includes Prostate surgery; sinus surgery; Carpal tunnel release; and IR KYPHOPLASTY LUMBAR 1 VERTEBRAL BODY (4/15/2021). Social History:  Reviewed. reports that he quit smoking about 26 years ago. His smoking use included pipe and cigars. He has never used smokeless tobacco. He reports current alcohol use. He reports that he does not use drugs. Allergies:  No Known Allergies    Family History:  Reviewed. family history includes Heart Disease in his sister; Kidney Disease in his sister. Denies family history of sudden cardiac death, arrhythmia, premature CAD    Home Meds:  Prior to Visit Medications    Medication Sig Taking? Authorizing Provider   ELIQUIS 2.5 MG TABS tablet TAKE 1 TABLET BY MOUTH TWICE A DAY Yes Yayo Denise APRN - CNP   furosemide (LASIX) 40 MG tablet Take 1 tablet by mouth 2 times daily Yes Nimisha Pineda MD   metoprolol succinate (TOPROL XL) 25 MG extended release tablet TAKE 1 TABLET EVERY DAY Yes Nimisha Pineda MD   timolol (TIMOPTIC-XE) 0.5 % ophthalmic gel-forming Place 1 drop into both eyes 2 times daily  Yes Historical Provider, MD   finasteride (PROSCAR) 5 MG tablet Take 5 mg by mouth daily  Yes Historical Provider, MD   simvastatin (ZOCOR) 40 MG tablet TAKE 1 TABLET IN THE EVENING Yes Historical Provider, MD        Scheduled Meds:   sodium chloride flush  10 mL Intravenous 2 times per day    apixaban  2.5 mg Oral BID    metoprolol succinate  25 mg Oral Daily    atorvastatin  40 mg Oral Nightly     Physical Examination:  Vitals:    05/03/21 0759   BP: 94/64   Pulse: 95   Resp: 18   Temp: 98.1 °F (36.7 °C)   SpO2: 95%      No intake/output data recorded.    Wt Readings from Last 3 Encounters:   05/03/21 146 lb 9.6 oz (66.5 kg)   04/14/21 158 lb (71.7 kg)   04/04/21 166 lb (75.3 kg)     No intake or output data in the 24 hours ending 05/03/21 0949    Telemetry: Personally Reviewed    · Constitutional: Cooperative and in no apparent distress, and appears well nourished  · Skin: Warm and pink; no pallor, cyanosis, clubbing, or bruising   · HEENT: Symmetric and normocephalic. PERRL, EOM intact. Conjunctiva pink with clear sclera. Mucus membranes moist.   · Cardiovascular: Regular rate and rhythm. S1/S  · Respiratory: Respirations symmetric and unlabored. Lungs clear to auscultation bilaterally, no wheezing, crackles, or rhonchi  · Gastrointestinal: Abdomen soft and round. Bowel sounds normoactive without tenderness or masses. · Musculoskeletal: Bilateral upper and lower extremity strength 5/5 with full ROM  · Neurologic/Psych: Awake and orientated to person, place and time. Calm affect, appropriate mood      BMP:   Recent Labs     05/02/21 1851 05/03/21  0441    140   K 3.7 3.8    104   CO2 25 21   BUN 60* 65*   CREATININE 2.8* 2.8*     Estimated Creatinine Clearance: 14 mL/min (A) (based on SCr of 2.8 mg/dL (H)).    CBC:   Recent Labs     05/02/21 1851 05/03/21  0442   WBC 7.4 7.6   HGB 11.2* 11.1*   HCT 34.0* 33.3*   MCV 92.1 91.8    191     Thyroid:   Lab Results   Component Value Date    TSH 2.51 06/18/2011     Lipids:   Lab Results   Component Value Date    CHOL 88 07/02/2020    HDL 38 07/02/2020    TRIG 60 07/02/2020     LFTS:   Lab Results   Component Value Date    ALT 43 05/03/2021    AST 39 05/03/2021    ALKPHOS 135 05/03/2021    PROT 7.0 05/03/2021    AGRATIO 0.7 05/03/2021    BILITOT 1.0 05/03/2021     Cardiac Enzymes:   Lab Results   Component Value Date    CKTOTAL 71 06/19/2011    CKMB 1.4 06/19/2011    TROPONINI 0.06 05/03/2021    TROPONINI 0.06 05/03/2021    TROPONINI 0.05 05/02/2021     Coags:   Lab Results   Component Value Date    PROTIME 18.7 05/02/2021    INR 1.60 05/02/2021         Problem List:   Patient Active Problem List    Diagnosis Date Noted    [facility]STEMI (non-ST elevated [facility] infarction) ([facility] 05/02/2021    Closed compression fracture of body of L1 vertebra (HCC) 04/04/2021    Chronic heart failure with preserved ejection fraction (HFpEF) ([facility] 07/01/2020    Chest pain 06/07/2019    Exertional dyspnea 06/07/2019    Localized edema 06/07/2019    Cardiac pacemaker in situ 03/08/2017    Bradycardia 03/08/2017    CKD (chronic kidney disease) stage 3, GFR 30-59 ml/min 06/21/2011    Acute non-ST-elevation MI following previous MI ([facility] 06/18/2011    Hypertension 06/18/2011    A-fib ([facility] 06/18/2011    Acute kidney injury superimposed on CKD ([facility] 06/18/2011        Assessment     admitted with c/o substernal cp after choking while eating, no c/o fever chills cough  n/v or SOB lasting 30 minutes. Pt is DNR-CC   No c/o voiced today     Trop bump / flat in setting of DUSTIN/CRI / CHF    sCr 2.8 (range 1.6-2.8) neph following   Trop  0.06>0.05>0.06 >0.06   ProBNP 7490   TTE pending     Nicki 2011: normal.    Nicki 06/2019: normal     persistent afib  [id number] Score for Atrial Fibrillation Stroke Risk   Risk   Factors  Component Value   C CHF Yes 1   H HTN Yes 1   A2 Age >= 76 Yes,  (80 y.o.) 2   D DM No 0   S2 Prior Stroke/TIA No 0   V Vascular Disease Yes 1   A Age 74-69 No,  (80 y.o.) 0   Sc Sex male 0    [id number]  Score  5     CAF s/p unsuccessful AVJ ablation and Biotronik dual chamber PPM in 07/2011, follows with Dr. Eugenie Robison, CKD (BL cr 1.8 - 2.0). The RA lead appears to be dislodged and undersensing afib. Device was changed to VVI to minimize RV pacing (was pacing the RV 90%) in 02/2019.   On eliquis and metoprolol       CRDIII  Follows neph   sCr 2.8 (range 1.6-2.8) neph following     Hx SVT   In afib     HTN   B/p on low side ./ asymptomatic     HFpEF  Echo 2017: LVEF 50%, moderate MR, nl RV, mild valve disease, RVSP 42  Echo 06/2019: mild LVH, LVEF 50-55%, indeterminate diastolic due to afib, mild

## 2021-05-03 NOTE — PROGRESS NOTES
Pt arrived from ED to room 6304 in stable condition. Telemetry monitor applied. Pt is in a-fib with a rate between 110-150's. Denies any chest pain or other type of pain. He is having some nausea. Oriented pt to room and unit routines. Bed low and locked. Bed alarm activated. Call light given to pt and instructed him to call for assistance if needing to get out of bed. Pt verbalized understanding. Door open. Will monitor.

## 2021-05-03 NOTE — ACP (ADVANCE CARE PLANNING)
ADVANCED CARE PLANNING    Name:German Hall       :  1921              MRN:  [id number]  Admission Date: 2021  6:30 PM  Date of Discussion:  2021      Purpose of Encounter: Advanced care planning in light of Hospitalization/  Parties in attendance: :Aziza King DO, Family members: None  Decisional Capacity:Yes      Objective/Medical Story: Patient is a 43-year-old male admitted for new onset chest pain transferred from a facility. Patient reported in lieu of decompensation he would want intervention to be done however in the setting of an arrest patient does not want any resuscitation attempts. Patient verbalized understanding and would like to be DNR CCA. In the setting patient's BP was to drop unresponsive with fluid, patient was agreeable for central line for pressors if indicated. Also, in the setting of respiratory failure, patient was amendable for BiPAP but no intubation. But if it is for short-term or procedure would be amendable for it. Active Diagnoses: Active Hospital Problems    Diagnosis Date Noted    NSTEMI (non-ST elevated myocardial infarction) ([facility] [address] [I21.4] 2021    Chest pain [R07.9] 2019       These active diagnoses are of sufficient risk that focused discussion on advance care planning is indicated in order to allow the patient to thoughtfully consider personal goals of care; and if situations arise that prevent the ability to personally give input, to ensure appropriate representation of their personal desires for different levels and agressiveness of care. Goals of Care Determinations: Patient wishes to focus on remaining Full Code and proceeding with full scope of practice. Code Status: At this time patient wishes to be DNR CCA code         Time Spent on Advanced Planning Documents: 16 mins.     The following items were considered in medical decision making:  Independent review of images , Review / order clinical lab

## 2021-05-03 NOTE — PROGRESS NOTES
Thank you for consulting [facility] Nephrology in the care of your patient. A full consult will follow but if you have any questions I can be reached through our office at [phone] or through [address] [place] [address]. Thank you.   Dr. Pat Harley

## 2021-05-03 NOTE — PROGRESS NOTES
4 Eyes Admission Assessment     I agree as the admission nurse that 2 RN's have performed a thorough Head to Toe Skin Assessment on the patient. ALL assessment sites listed below have been assessed on admission. Areas assessed by both nurses:   [x]   Head, Face, and Ears   [x]   Shoulders, Back, and Chest  [x]   Arms, Elbows, and Hands   [x]   Coccyx, Sacrum, and Ischium  [x]   Legs, Feet, and Heels        Does the Patient have Skin Breakdown?   No         Jose Prevention initiated:  No   Wound Care Orders initiated:  No      [facility] nurse consulted for Pressure Injury (Stage 3,4, Unstageable, DTI, NWPT, and Complex wounds) or Jose score 18 or lower:  No      Nurse 1 eSignature: Electronically signed by Ismael Sharma RN on 5/2/21 at 10:24 PM EDT    **SHARE this note so that the co-signing nurse is able to place an eSignature**    Nurse 2 eSignature: Electronically signed by Louie Huffman RN on 5/3/21 at 6:51 AM EDT

## 2021-05-03 NOTE — PLAN OF CARE
Problem: Falls - Risk of:  Goal: Will remain free from falls  Description: Will remain free from falls  5/3/2021 1604 by Carli Sutton RN  Note: He is a fall risk. Armband in place, door open, and bed alarm on. Call light in reach. Will monitor. Problem: Cardiac Output - Decreased:  Goal: Hemodynamic stability will improve  Description: Hemodynamic stability will improve  5/3/2021 1604 by Carli Sutton RN  Note: A fib on telemetry. Skin warm and dry, pulses palpable. Will monitor. Problem: Pain:  Goal: Control of acute pain  Description: Control of acute pain  5/3/2021 1604 by Carli Sutton RN  Note: No complaints of pain. Will monitor.

## 2021-05-03 NOTE — PLAN OF CARE
Problem: Falls - Risk of:  Goal: Will remain free from falls  Description: Will remain free from falls  Outcome: Ongoing  Note: Safety precautions in place; call light within reach and pt has demonstrated ability to use it appropriately. Bed alarm activated, door cracked per patient preference. Will monitor. Problem: Falls - Risk of:  Goal: Absence of physical injury  Description: Absence of physical injury  Outcome: Ongoing  Note: No physical injury to date this hospitalization. Problem: Cardiac Output - Decreased:  Goal: Hemodynamic stability will improve  Description: Hemodynamic stability will improve  Outcome: Ongoing  Note: Blood pressure WNL and consistent. Heart rate irregular, a-fib per telemetry. Problem: Serum Glucose Level - Abnormal:  Goal: Ability to maintain appropriate glucose levels will improve  Description: Ability to maintain appropriate glucose levels will improve  Outcome: Ongoing  Note: Serum glucose 168. Problem: Pain:  Goal: Pain level will decrease  Description: Pain level will decrease  Outcome: Ongoing  Note: Pt currently denying pain and states he has not been feeling any chest discomfort since arriving to [address] [place]. He does, however, have chronic back pain which prevents him from being active.

## 2021-05-03 NOTE — CONSULTS
Patient Name: Claire Goetz                                                    Primary Physician: Jessika Morgan DO  Admitting Dx: NSTEMI (non-ST elevated myocardial infarction) [facility]) [I21.4]  Chest pain [R07.9]    Nephrology [facility] Nephrology  [email or web address] [employer]                                          Assessment / PLan:     Stage IV CKD with ROSALINO  · SCr was 2.8 on admission from 2.4 last month. Holding diuretics and BP meds including Lisinopril. No plans for dialysis. · Hx of CRS with fluid compression on renal vessels leading to the eventual ROSALINO  · Increase in Cr levels from 1.8 on 2/18/19 to 2.3 on previous admission  · Previously on Lasix 40mg IV BID b/c of fluid overload which is not an issue currently. CHF (last echo 6/14/19 EF of 50-55%, no RWMA, RVH with mildly depressed systolic function)  · No current edema and holding on diuresis with Lasix 40mg IV BID  · Continue metoprolol 25mg   · NSTEMI with Cardiology following. Very high risk for LHC / COntrast.   HTN  · BP has been soft with 77J-375M systolic and remains off Lisinopril with previous admission and ROSALINO  · Continue metoprolol 25mg  Anemia  · Drop in Hgb from 11.1 and no ONEAL. · Check Iron studies    Please call our office at [phone] or [employer] with any questions or contact me directly. Subjective:     CC / Reason for Consult:  CKD 4    HPI / PMHx:  This is a consult for Claire Goetz  requested by Jessika Morgan DO for the reason of  ROSALINO w/ CKD 4. Claire Goetz is a 80 y.o. male admitted for Chest pain (R07.9) and NSTEMI (I21.4) with PMHx of CKD 4, HTN, CHF, Edema, Anemia, SHPT, NSTEMI. Patient baseline SCr fluctuation with volume status and CHF. Previously taken off ACEi for ROSALINO. Comes in now hypotensive. Admitted with chest pain and transferred from facility and was DNR CCA on admission. I thank Dr. Jessika Morgan DO for consulting [facility] [place]. [address] [place] [address] Nephrology in the care of your patient. Please call with any questions or concerns. [phone]. Joana Ely    Objective:     SocHx: Living at Nursing home    FamHx: NC    Current Medications:  Scheduled Medications:  sodium chloride flush, 10 mL, 2 times per day  apixaban, 2.5 mg, BID  metoprolol succinate, 25 mg, Daily  atorvastatin, 40 mg, Nightly       IV Meds:  sodium chloride       PRN Medications:  sodium chloride flush, 10 mL, PRN  sodium chloride, 25 mL, PRN  potassium chloride, 10 mEq, PRN  magnesium sulfate, 2,000 mg, PRN  promethazine, 12.5 mg, Q6H PRN    Or  ondansetron, 4 mg, Q6H PRN  famotidine, 20 mg, Daily PRN  acetaminophen, 650 mg, Q6H PRN    Or  acetaminophen, 650 mg, Q6H PRN        Allergies: Patient has no known allergies. ROS: Positives in BOLD     GEN:   fevers, chills, sweats, fatigue and weight loss     HEENT:    nasal congestion, sore mouth and sore throat     Resp:    cough, hemoptysis, pneumonia or dyspnea on exertion     Card:  chest pain, chest pressure/discomfort, dyspnea, palpitations,  lower extremity edema     GI:   nausea, vomiting, diarrhea, constipation and abdominal pain     :  dysuria, nocturia, urinary incontinence, hesitancy and hematuria     Derm:   rash, skin lesion(s), pruritus and dryness     Neuro:   headaches, dizziness, seizures, gait problems, tremor and weakness     MS:  myalgias, arthralgias, neck pain and back pain     Endo:    nephropathy and cardiovascular disease    PE:      Gen: alert, well appearing, and in no distress     HEENT:pupils equal and reactive, extraocular eye movements intact      Neuro: alert, oriented, normal speech, no focal findings or movement disorder noted     Neck:  supple, no significant adenopathy     Cardio: normal rate, regular rhythm, normal S1, S2, no murmurs, rubs, clicks or gallops      Resp: rales noted in Left base. GI:  soft, nontender, nondistended, no masses or organomegaly.       Ext:  peripheral pulses normal, no pedal edema, no clubbing or cyanosis 06-Aug-2020 14:22

## 2021-05-04 LAB
A/G RATIO: 0.8 (ref 1.1–2.2)
ALBUMIN SERPL-MCNC: 3 G/DL (ref 3.4–5)
ALP BLD-CCNC: 154 U/L (ref 40–129)
ALT SERPL-CCNC: 33 U/L (ref 10–40)
ANION GAP SERPL CALCULATED.3IONS-SCNC: 14 MMOL/L (ref 3–16)
AST SERPL-CCNC: 28 U/L (ref 15–37)
BASOPHILS ABSOLUTE: 0 K/UL (ref 0–0.2)
BASOPHILS RELATIVE PERCENT: 0.1 %
BILIRUB SERPL-MCNC: 1 MG/DL (ref 0–1)
BUN BLDV-MCNC: 62 MG/DL (ref 7–20)
CALCIUM SERPL-MCNC: 9 MG/DL (ref 8.3–10.6)
CHLORIDE BLD-SCNC: 102 MMOL/L (ref 99–110)
CO2: 22 MMOL/L (ref 21–32)
CREAT SERPL-MCNC: 2.5 MG/DL (ref 0.8–1.3)
EOSINOPHILS ABSOLUTE: 0 K/UL (ref 0–0.6)
EOSINOPHILS RELATIVE PERCENT: 0.2 %
GFR AFRICAN AMERICAN: 29
GFR NON-AFRICAN AMERICAN: 24
GLOBULIN: 3.8 G/DL
GLUCOSE BLD-MCNC: 98 MG/DL (ref 70–99)
HCT VFR BLD CALC: 31.9 % (ref 40.5–52.5)
HEMOGLOBIN: 10.5 G/DL (ref 13.5–17.5)
LYMPHOCYTES ABSOLUTE: 0.4 K/UL (ref 1–5.1)
LYMPHOCYTES RELATIVE PERCENT: 5.2 %
MAGNESIUM: 2.3 MG/DL (ref 1.8–2.4)
MCH RBC QN AUTO: 30.2 PG (ref 26–34)
MCHC RBC AUTO-ENTMCNC: 33 G/DL (ref 31–36)
MCV RBC AUTO: 91.4 FL (ref 80–100)
MONOCYTES ABSOLUTE: 1 K/UL (ref 0–1.3)
MONOCYTES RELATIVE PERCENT: 14 %
NEUTROPHILS ABSOLUTE: 5.9 K/UL (ref 1.7–7.7)
NEUTROPHILS RELATIVE PERCENT: 80.5 %
PDW BLD-RTO: 15.7 % (ref 12.4–15.4)
PLATELET # BLD: 171 K/UL (ref 135–450)
PMV BLD AUTO: 8.7 FL (ref 5–10.5)
POTASSIUM REFLEX MAGNESIUM: 3.3 MMOL/L (ref 3.5–5.1)
RBC # BLD: 3.49 M/UL (ref 4.2–5.9)
SODIUM BLD-SCNC: 138 MMOL/L (ref 136–145)
TOTAL PROTEIN: 6.8 G/DL (ref 6.4–8.2)
TROPONIN: 0.05 NG/ML
TROPONIN: 0.06 NG/ML
TROPONIN: 0.07 NG/ML
WBC # BLD: 7.4 K/UL (ref 4–11)

## 2021-05-04 PROCEDURE — 6370000000 HC RX 637 (ALT 250 FOR IP): Performed by: INTERNAL MEDICINE

## 2021-05-04 PROCEDURE — 84484 ASSAY OF TROPONIN QUANT: CPT

## 2021-05-04 PROCEDURE — 85025 COMPLETE CBC W/AUTO DIFF WBC: CPT

## 2021-05-04 PROCEDURE — 1200000000 HC SEMI PRIVATE

## 2021-05-04 PROCEDURE — 97162 PT EVAL MOD COMPLEX 30 MIN: CPT

## 2021-05-04 PROCEDURE — 36415 COLL VENOUS BLD VENIPUNCTURE: CPT

## 2021-05-04 PROCEDURE — 93325 DOPPLER ECHO COLOR FLOW MAPG: CPT

## 2021-05-04 PROCEDURE — 2580000003 HC RX 258: Performed by: INTERNAL MEDICINE

## 2021-05-04 PROCEDURE — 83735 ASSAY OF MAGNESIUM: CPT

## 2021-05-04 PROCEDURE — 97530 THERAPEUTIC ACTIVITIES: CPT

## 2021-05-04 PROCEDURE — 93308 TTE F-UP OR LMTD: CPT

## 2021-05-04 PROCEDURE — 97166 OT EVAL MOD COMPLEX 45 MIN: CPT

## 2021-05-04 PROCEDURE — 97116 GAIT TRAINING THERAPY: CPT

## 2021-05-04 PROCEDURE — 99233 SBSQ HOSP IP/OBS HIGH 50: CPT | Performed by: INTERNAL MEDICINE

## 2021-05-04 PROCEDURE — 6360000002 HC RX W HCPCS: Performed by: INTERNAL MEDICINE

## 2021-05-04 PROCEDURE — 97535 SELF CARE MNGMENT TRAINING: CPT

## 2021-05-04 PROCEDURE — 97110 THERAPEUTIC EXERCISES: CPT

## 2021-05-04 PROCEDURE — 80053 COMPREHEN METABOLIC PANEL: CPT

## 2021-05-04 RX ORDER — METOPROLOL SUCCINATE 50 MG/1
50 TABLET, EXTENDED RELEASE ORAL DAILY
Status: DISCONTINUED | OUTPATIENT
Start: 2021-05-04 | End: 2021-05-05

## 2021-05-04 RX ADMIN — POTASSIUM CHLORIDE 10 MEQ: 7.46 INJECTION, SOLUTION INTRAVENOUS at 09:50

## 2021-05-04 RX ADMIN — GUAIFENESIN AND DEXTROMETHORPHAN 5 ML: 100; 10 SYRUP ORAL at 03:47

## 2021-05-04 RX ADMIN — ATORVASTATIN CALCIUM 40 MG: 40 TABLET, FILM COATED ORAL at 21:45

## 2021-05-04 RX ADMIN — POTASSIUM CHLORIDE 10 MEQ: 7.46 INJECTION, SOLUTION INTRAVENOUS at 08:29

## 2021-05-04 RX ADMIN — Medication 10 ML: at 21:47

## 2021-05-04 RX ADMIN — GUAIFENESIN AND DEXTROMETHORPHAN 5 ML: 100; 10 SYRUP ORAL at 21:45

## 2021-05-04 RX ADMIN — POTASSIUM CHLORIDE 10 MEQ: 7.46 INJECTION, SOLUTION INTRAVENOUS at 11:00

## 2021-05-04 RX ADMIN — APIXABAN 2.5 MG: 2.5 TABLET, FILM COATED ORAL at 08:28

## 2021-05-04 RX ADMIN — Medication 10 ML: at 08:29

## 2021-05-04 RX ADMIN — METOPROLOL SUCCINATE 50 MG: 50 TABLET, EXTENDED RELEASE ORAL at 08:28

## 2021-05-04 RX ADMIN — POTASSIUM CHLORIDE 10 MEQ: 7.46 INJECTION, SOLUTION INTRAVENOUS at 12:07

## 2021-05-04 RX ADMIN — APIXABAN 2.5 MG: 2.5 TABLET, FILM COATED ORAL at 21:45

## 2021-05-04 ASSESSMENT — PAIN SCALES - GENERAL
PAINLEVEL_OUTOF10: 0

## 2021-05-04 NOTE — PROGRESS NOTES
The [address] [place] [address]  Cardiology Daily Progress Note  5/4/2021,11:47 AM      Dustin Gomez MD  Primary cardiologist:    Admit Date:  5/2/2021  Hospital Day: Hospital Day: 3  Subjective:  Mr. Mary Ann Hitchcock patient awake and alert. No chest pains. Still atrial fibrillation with rapid ventricular response. Has received additional doses of metoprolol. Does have creatinine of 2.5. Will recommend an additional but doses of beta-blocker I do believe he has enough blood pressure. May need to add diltiazem and or flecainide for rate control. Objective:   /76   Pulse 92   Temp 97.8 °F (36.6 °C) (Oral)   Resp 18   Ht 5' 11.5\" (1.816 m)   Wt 147 lb 4.3 oz (66.8 kg)   SpO2 99%   BMI 20.25 kg/m²       Intake/Output Summary (Last 24 hours) at 5/4/2021 1147  Last data filed at 5/4/2021 0344  Gross per 24 hour   Intake 840 ml   Output 450 ml   Net 390 ml       TELEMETRY: Atrial fibrillation 110/min. Physical Examination:    Vitals:    05/03/21 2001 05/03/21 2339 05/04/21 0344 05/04/21 0807   BP: 101/80 111/70 127/73 130/76   Pulse: 123 116 120 92   Resp: 18 18 18 18   Temp: 98.1 °F (36.7 °C) 97.1 °F (36.2 °C) 98.6 °F (37 °C) 97.8 °F (36.6 °C)   TempSrc: Oral Oral Oral Oral   SpO2: 97% 97% 97% 99%   Weight:   147 lb 4.3 oz (66.8 kg)    Height:            Constitutional and General Appearance:  Healthy. And alert . HEENT: eyes and ears intact. No nasal masses  THYROID: not enlarged  LUNGS:  · Clear to auscultation and percussion  HEART and VASCULAR:  · The apical impulses not displaced  · Heart tones are crisp and normal  · Cervical veins are not engorged  · The carotid upstroke is normal in amplitude and contour without delay or bruit  · Peripheral pulses are symmetrical and full  · There is no clubbing, cyanosis of the extremities.   · No peripheral edema  · Femoral Arteries: 2+ and equal  · Pedal Pulses: 2+ and equal   ABDOMEN[de-identified]  · No masses or Bradycardia 03/08/2017    CKD (chronic kidney disease) stage 3, GFR 30-59 ml/min 06/21/2011    Acute non-ST-elevation MI following previous MI ([facility] 06/18/2011    Hypertension 06/18/2011    A-fib ([facility] 06/18/2011    Acute kidney injury superimposed on CKD ([facility] 06/18/2011       Plan:   Continue current medications. Core Measures:  · Discharge instructions:   · LVEF documented:   · ACEI for LV dysfunction:   · Atrial fibrillation rapid ventricular response. Clinically improving. Renal failure with creatinine 2.5. Hyperlipidemia. Echocardiogram pending. Thanks for allowing me  the opportunity to participate in the evaluation and care of your patient.  If there are questions please call me [phone]    This note was likely completed using voice recognition technology and may contain unintended grammatical, phraseology and/or punctuation  errors      Benito Dorman M.D.,[facility]  5/4/2021 11:47 AM

## 2021-05-04 NOTE — PROGRESS NOTES
Patient Name: Yariel Vaughan                                                    Primary Physician: Conner Alvarez DO  Admitting Dx: NSTEMI (non-ST elevated myocardial infarction) [facility]) [I21.4]  Chest pain [R07.9]    Dr. aPt Harley      Nephrology [facility] Progress Note  [facility]phrology  [email or web address] [employer]                                       Interval Plan:     · SCr now 2.5 holding diuretics and Lisinopril. NO plans for dialysis. · K is low and replaced. · Hgb 11.1. No changes. · BP better 125/80 and prefer 130/80s. On Metoprolol 50 mg qd. May reduce to 25 mg if not rising further. Assessment:     Stage IV CKD with ROSALINO  · SCr was 2.8 on admission from 2.4 last month. Holding diuretics and BP meds including Lisinopril. No plans for dialysis. · Hx of CRS with fluid compression on renal vessels leading to the eventual ROSALINO  · Increase in Cr levels from 1.8 on 2/18/19 to 2.3 on previous admission  · Previously on Lasix 40mg IV BID b/c of fluid overload which is not an issue currently. CHF (last echo 6/14/19 EF of 50-55%, no RWMA, RVH with mildly depressed systolic function)  · No current edema and holding on diuresis with Lasix 40mg IV BID  · Continue metoprolol 25mg   · NSTEMI with Cardiology following. Very high risk for LHC / COntrast.   HTN  · BP has been soft with 71Q-128X systolic and remains off Lisinopril with previous admission and ROSALINO  · Continue metoprolol 25mg  Anemia  · Drop in Hgb from 11.1 and no ONEAL. · Check Iron studies    Please call our office at [phone] or [employer] with any questions or contact me directly. Subjective:     CC / Reason for Consult:  CKD 4    HPI/PMH:    Yariel Vaughna is a 80 y.o. male admitted for Chest pain (R07.9) and NSTEMI (I21.4) with PMHx of CKD 4, HTN, CHF, Edema, Anemia, SHPT, NSTEMI. Patient baseline SCr fluctuation with volume status and CHF. Previously taken off ACEi for ROSALINO. Comes in now hypotensive.   Admitted with chest pain and transferred from facility and was DNR CCA on admission.         ROS / Interval Hx: Patient seen in room. Resting in bed with no family or nursing. Resting in bed. Coughing up phlegm but breathing is fine and states he is eating. Objective:        Gen: alert, well appearing, and in no distress and oriented to person, place, and time     Neck:  supple, no significant adenopathy     Cardio: normal rate, regular rhythm, normal S1, S2, no murmurs, rubs, clicks or gallops      Resp: clear to auscultation, no wheezes, rales or rhonchi, symmetric air entry. GI:  soft, nontender, nondistended, no masses or organomegaly. Ext:  peripheral pulses normal, no pedal edema, no clubbing or cyanosis      MS: no joint tenderness, deformity or swelling      DERM: normal coloration and turgor, no rashesor bruising    Vitals:     /80   Pulse 68   Temp 97.8 °F (36.6 °C) (Oral)   Resp 16   Ht 5' 11.5\" (1.816 m)   Wt 147 lb 4.3 oz (66.8 kg)   SpO2 95%   BMI 20.25 kg/m²      I/Os: Reviewed    Meds: Reviewed. Please see plan for changes made.     Labs:    Lab Results   Component Value Date    WBC 7.4 05/04/2021    HGB 10.5 05/04/2021    HCT 31.9 05/04/2021    MCV 91.4 05/04/2021     05/04/2021      Lab Results   Component Value Date    CREATININE 2.5 05/04/2021    BUN 62 05/04/2021     05/04/2021    K 3.3 05/04/2021     05/04/2021    CO2 22 05/04/2021     No components found for: GLU   Lab Results   Component Value Date    PTH 47.7 07/02/2020    CALCIUM 9.0 05/04/2021    PHOS 3.7 11/05/2019      No results found for: [id number]   Lab Results   Component Value Date    IRON 75 07/01/2020    TIBC 236 07/01/2020    FERRITIN 133.0 07/01/2020      No results found for: Nemo Lindsey

## 2021-05-04 NOTE — PROGRESS NOTES
Occupational Therapy   Occupational Therapy Initial Assessment/Treatment  Date: 2021   Patient Name: Parish Mckeon  MRN: [id number]     : 1921    Date of Service: 2021    Discharge Recommendations:    Parish Mckeon scored a 17/24 on the AM-PAC ADL Inpatient form. Current research shows that an AM-PAC score of 17 or less is typically not associated with a discharge to the patient's home setting. Based on the patient's AM-PAC score and their current ADL deficits, it is recommended that the patient have 3-5 sessions per week of Occupational Therapy at d/c to increase the patient's independence. Please see assessment section for further patient specific details. If patient discharges prior to next session this note will serve as a discharge summary. Please see below for the latest assessment towards goals. OT Equipment Recommendations  Equipment Needed: No    Assessment   Performance deficits / Impairments: Decreased functional mobility ; Decreased ADL status; Decreased endurance;Decreased balance  Assessment: Pt presents with a decline in functional independence. Pt is from home alone. Pt states that his daughter wants him to go to a nursing home. Feel pt would benefit from further OT services. Treatment Diagnosis: Impaired ADL, functional mobility, and functional activity tolerance  Prognosis: Good  Decision Making: Medium Complexity  OT Education: OT Role;Plan of Care  Patient Education: Pt verbalized understanding  REQUIRES OT FOLLOW UP: Yes  Activity Tolerance  Activity Tolerance: Patient limited by fatigue  Safety Devices  Safety Devices in place: Yes  Type of devices: Left in chair;Call light within reach; Chair alarm in place;Nurse notified       Treatment Diagnosis: Impaired ADL, functional mobility, and functional activity tolerance      Restrictions  Position Activity Restriction  Other position/activity restrictions: up with assist    Subjective   General  Chart Reviewed: Yes  Patient assessed for rehabilitation services?: Yes  Additional Pertinent Hx: Pt admitted 5/2/21 with chest pain. (+) NSTEMI    PMH includes: CHF, CTR, lumbar kyphoplasty, HTN  Family / Caregiver Present: No  Referring Practitioner: Dr. Bessy Painter  Diagnosis: NSTEMI  Subjective  Subjective: Pt in bed upon entry. I'm cold. Patient Currently in Pain: Denies  Vital Signs  Patient Currently in Pain: Denies  Social/Functional History  Social/Functional History  Lives With: Alone  Type of Home: Apartment(senior living building)  Home Layout: One level  Home Access: Level entry  Bathroom Shower/Tub: Tub/Shower unit, Shower chair with back  [employer]: Standard(grab bars nearby)  [employer]: Grab bars in shower, Hand-held shower  Home Equipment: 4 wheeled walker, Cane, Lift chair, Alert Button(uses 4WV and cane occasionally; mostly 4WW)  ADL Assistance: Independent  Homemaking Assistance: Needs assistance(wed he has  who cleans and does laundry; he also gets meals prepared that he heats up; children assist as needed)  Homemaking Responsibilities: Yes  Ambulation Assistance: Independent  Transfer Assistance: Independent  Active : No  Mode of Transportation: (gets ride from a medical service)  Occupation: Retired  Leisure & Hobbies: not too much of anything.  watch television likes to watch [email or web address]  Additional Comments: pt denies falls;       Objective   Vision: Impaired  Vision Exceptions: Wears glasses for reading(glasses for television)  Hearing: Within functional limits    Orientation  Overall Orientation Status: Within Functional Limits     Balance  Sitting Balance: Stand by assistance(seated edge of bed)  Standing Balance: Contact guard assistance(to SBA)  Standing Balance  Time: ~2 min, ~1 min  Activity: delgado care/bathing, walk in room  Functional Mobility  Functional - Mobility Device: Rolling Walker  Activity: Other(walk to/from door)  Assist Level: Contact guard assistance  ADL  UE term goal 1: Transfer to/from toilet with SBA  Short term goal 2: Stance with SBA x5 min while engaging in ADL/functional mobility  Short term goal 3: Lower body dressing with min assist  Patient Goals   Patient goals : Pt states that his daughter wants him to go to a NH       Therapy Time   Individual Concurrent Group Co-treatment   Time In 0858         Time Out 0936         Minutes 38         Timed Code Treatment Minutes: [address] [facility], OTR/L [address]

## 2021-05-04 NOTE — PLAN OF CARE
Problem: Falls - Risk of:  Goal: Will remain free from falls  Description: Will remain free from falls  5/4/2021 0031 by Mirta Du RN  Outcome: Ongoing  Note: Patient at risk for falls. Patient resting quietly in bed. Side rails up x 2. Bed locked in lowest position. Bed alarm on. Bedside table and call light within reach. Patient instructed to call for assistance. Patient verbalized understanding. Will continue to monitor. Problem: Falls - Risk of:  Goal: Absence of physical injury  Description: Absence of physical injury  Outcome: Ongoing     Problem: Discharge Planning:  Goal: Discharged to appropriate level of care  Description: Discharged to appropriate level of care  Outcome: Ongoing     Problem: Cardiac Output - Decreased:  Goal: Hemodynamic stability will improve  Description: Hemodynamic stability will improve  5/4/2021 0031 by Mirta Du RN  Outcome: Ongoing     Problem: Serum Glucose Level - Abnormal:  Goal: Ability to maintain appropriate glucose levels will improve  Description: Ability to maintain appropriate glucose levels will improve  Outcome: Ongoing     Problem: Pain:  Goal: Pain level will decrease  Description: Pain level will decrease  Outcome: Ongoing     Problem: Tissue Perfusion - Cardiopulmonary, Altered:  Goal: Absence of angina  Description: Absence of angina  Outcome: Ongoing  Note: Pt with no complaints of chest pain this PM. Pt instructed to call for any new/increasing pain levels. Pt verbalized understanding. Will continue to monitor.

## 2021-05-04 NOTE — PROGRESS NOTES
Hospital Medicine Care  Progress Note      Chief complain; Chest Pain   Dyspnea          Subjective:     Feels well  No cp this am  No sob  No nausea, emesis   Was working with PT OT   Review of Systems:     Review of Systems as mentioned above, other ros is negative. Objective:       Medications        Scheduled Meds:   metoprolol succinate  50 mg Oral Daily    sodium chloride flush  10 mL Intravenous 2 times per day    apixaban  2.5 mg Oral BID    atorvastatin  40 mg Oral Nightly     Continuous Infusions:   sodium chloride       PRN Meds:.guaiFENesin-dextromethorphan, sodium chloride flush, sodium chloride, potassium chloride, magnesium sulfate, promethazine **OR** ondansetron, famotidine, acetaminophen **OR** acetaminophen      Allergies  No Known Allergies      Vitals:    05/03/21 2339 05/04/21 0344 05/04/21 0807 05/04/21 1146   BP: 111/70 127/73 130/76 125/80   Pulse: 116 120 92 68   Resp: 18 18 18 16   Temp: 97.1 °F (36.2 °C) 98.6 °F (37 °C) 97.8 °F (36.6 °C) 97.8 °F (36.6 °C)   TempSrc: Oral Oral Oral Oral   SpO2: 97% 97% 99% 95%   Weight:  147 lb 4.3 oz (66.8 kg)     Height:             Physical exam:         Physical Exam  Constitutional:       Appearance: Normal appearance. HENT:      Head: Normocephalic and atraumatic. Cardiovascular:      Rate and Rhythm: Normal rate and regular rhythm. Pulses: Normal pulses. Pulmonary:      Effort: Pulmonary effort is normal.      Breath sounds: Normal breath sounds. Abdominal:      General: Abdomen is flat. Palpations: Abdomen is soft. Musculoskeletal: Normal range of motion. Skin:     General: Skin is warm and dry. Capillary Refill: Capillary refill takes less than 2 seconds. Neurological:      General: No focal deficit present. Mental Status: He is alert and oriented to person, place, and time.    Psychiatric:         Mood and Affect: Mood normal.         Behavior: Behavior normal.               Labs      Recent Labs 05/02/21 1851 05/03/21  0442 05/04/21  0552   WBC 7.4 7.6 7.4   HGB 11.2* 11.1* 10.5*   HCT 34.0* 33.3* 31.9*    191 171   MCV 92.1 91.8 91.4        Recent Labs     05/02/21 1851 05/03/21  0441 05/04/21  0552    140 138   K 3.7 3.8 3.3*    104 102   CO2 25 21 22   BUN 60* 65* 62*   CREATININE 2.8* 2.8* 2.5*       Recent Labs     05/03/21 0441 05/04/21  0552   AST 39* 28   ALT 43* 33   BILITOT 1.0 1.0   ALKPHOS 135* 154*       Recent Labs     05/03/21 0441 05/04/21  0552   MG 2.40 2.30       Radiology  XR CHEST PORTABLE   Final Result      Cardiomegaly with mild pulmonary vascular congestion. Assessment and Plan:   Principal Problem:    NSTEMI (non-ST elevated myocardial infarction) ([address]  Active Problems:    A-fib (HCC)    Acute kidney injury superimposed on CKD (HCC)    Chest pain    NSTEMI (non-ST elevation myocardial infarction) ([address]  Resolved Problems:    * No resolved hospital problems. *     Plan  Chest pain   S/p ASA in ER. On BB and Statin. Cardiology consultation, likely conservative management given advanced age. Echo showed normal EF . Afib  Uncontrolled  Increase beta-blocker  Anticoagulated with apixaban    ROSALINO on CKD  Baseline 2.4  Improving.   Creatinine close to baseline    PT OT pending placement            Agustin Carlson MD  5/4/2021

## 2021-05-04 NOTE — PROGRESS NOTES
(non-ST elevation myocardial infarction) ([facility] Diagnoses of Hypotension, unspecified hypotension type and Elevated brain natriuretic peptide (BNP) level were also pertinent to this visit. has a past medical history of Acute on chronic diastolic (congestive) heart failure ([address] Hypertension, and SVT (supraventricular tachycardia) ([address] has a past surgical history that includes Prostate surgery; sinus surgery; Carpal tunnel release; and IR KYPHOPLASTY LUMBAR 1 VERTEBRAL BODY (4/15/2021). Restrictions  Position Activity Restriction  Other position/activity restrictions: up with assist  Vision/Hearing  Vision: Impaired  Vision Exceptions: Wears glasses for reading(glasses for television)  Hearing: Within functional limits     Subjective  General  Chart Reviewed: Yes  Patient assessed for rehabilitation services?: Yes  Additional Pertinent Hx: The pt is a 75-year-old male brought in by EMS for chest pain. Patient has a history of atrial fibrillation on Eliquis, NSTEMI and CKD stage III, per EMS he experienced acute onset of chest pain that occurred after eating. He received nitro and aspirin with no change. Pt noted to be tachycardic suspected SVT  Family / Caregiver Present: No  Referring Practitioner: Rosa Constantino MD  Diagnosis: NSTEMI  Follows Commands: Within Functional Limits  General Comment  Comments: The pt presents supine in bed and willing to work with therapist.  Subjective  Subjective: \"I'm doing ok I guess. \"  Pain Screening  Patient Currently in Pain: Denies  Vital Signs  Patient Currently in Pain: Denies       Orientation  Orientation  Overall Orientation Status: Within Functional Limits  Social/Functional History  Social/Functional History  Lives With: Alone  Type of Home: Apartment(senior living building)  Home Layout: One level  Home Access: Level entry  Bathroom Shower/Tub: Tub/Shower unit, Shower chair with back  [employer]: Standard(grab bars nearby)  [employer]: Grab bars

## 2021-05-04 NOTE — CARE COORDINATION
[facility] reps stopped by about this patient. They are following this patient due to the patient being with their services. They were working with this patient prior to admissions and are having a meeting with the family for signing of papers for hospice in the [facility] and [facility], [phone]) will be the nurse following patient today. The family will be choosing an LTC location today and [facility] will help communicate that info to this CM. [facility] has my number. The family is working with [employer] for [employer] [facility]. CM will continue to follow patient until discharge.  Electronically signed by Arsenio Ivan RN on 5/4/2021 at 11:15 AM

## 2021-05-04 NOTE — PROGRESS NOTES
(Types 1   - 5 MI)  Options provided:  -- NSTEMI ruled out after study and demand ischemia due to tachycardia   confirmed  -- NSTEMI due to tachycardia present as evidenced by, Please document   evidence. -- Other - I will add my own diagnosis  -- Disagree - Not applicable / Not valid  -- Disagree - Clinically unable to determine / Unknown  -- Refer to Clinical Documentation Reviewer    PROVIDER RESPONSE TEXT:    NSTEMI was ruled out after study and demand ischemia due to tachycardia   confirmed. Query created by: Reinaldo Watt on 5/4/2021 11:36 AM      QUERY TEXT:    Pt admitted with chest pain and has CHF documented. If possible, please   document in progress notes and discharge summary further specificity regarding   the type and acuity of CHF:    The medical record reflects the following:  Risk Factors: 4 yo w/ chest pain, history of CHF  Clinical Indicators: Per Cardiology 5/3: HFpEF. Echo 06/2019: mild LVH, LVEF   50-55%, indeterminate diastolic due to afib, mild RVD with mild dysfunction,   moderate TR. ProBNP 7490  Treatment: Home dose of Toprol XL 25mg PO daily. Options provided:  -- Chronic Diastolic CHF/HFpEF  -- Other - I will add my own diagnosis  -- Disagree - Not applicable / Not valid  -- Disagree - Clinically unable to determine / Unknown  -- Refer to Clinical Documentation Reviewer    PROVIDER RESPONSE TEXT:    This patient has chronic diastolic CHF/HFpEF.     Query created by: Reinaldo Watt on 5/4/2021 11:36 AM      Electronically signed by:  Tk Novoa MD 5/4/2021 1:15 PM

## 2021-05-05 ENCOUNTER — APPOINTMENT (OUTPATIENT)
Dept: GENERAL RADIOLOGY | Age: 86
DRG: 682 | End: 2021-05-05
Payer: MEDICARE

## 2021-05-05 LAB
ALBUMIN SERPL-MCNC: 3 G/DL (ref 3.4–5)
ANION GAP SERPL CALCULATED.3IONS-SCNC: 18 MMOL/L (ref 3–16)
BUN BLDV-MCNC: 63 MG/DL (ref 7–20)
CALCIUM SERPL-MCNC: 9.2 MG/DL (ref 8.3–10.6)
CHLORIDE BLD-SCNC: 101 MMOL/L (ref 99–110)
CO2: 18 MMOL/L (ref 21–32)
CREAT SERPL-MCNC: 2.5 MG/DL (ref 0.8–1.3)
GFR AFRICAN AMERICAN: 29
GFR NON-AFRICAN AMERICAN: 24
GLUCOSE BLD-MCNC: 99 MG/DL (ref 70–99)
PHOSPHORUS: 3.3 MG/DL (ref 2.5–4.9)
POTASSIUM SERPL-SCNC: 3.9 MMOL/L (ref 3.5–5.1)
SODIUM BLD-SCNC: 137 MMOL/L (ref 136–145)
TROPONIN: 0.06 NG/ML

## 2021-05-05 PROCEDURE — 92526 ORAL FUNCTION THERAPY: CPT

## 2021-05-05 PROCEDURE — 6360000002 HC RX W HCPCS: Performed by: INTERNAL MEDICINE

## 2021-05-05 PROCEDURE — 6370000000 HC RX 637 (ALT 250 FOR IP): Performed by: INTERNAL MEDICINE

## 2021-05-05 PROCEDURE — 71045 X-RAY EXAM CHEST 1 VIEW: CPT

## 2021-05-05 PROCEDURE — 36415 COLL VENOUS BLD VENIPUNCTURE: CPT

## 2021-05-05 PROCEDURE — 2580000003 HC RX 258: Performed by: INTERNAL MEDICINE

## 2021-05-05 PROCEDURE — 74230 X-RAY XM SWLNG FUNCJ C+: CPT

## 2021-05-05 PROCEDURE — 92611 MOTION FLUOROSCOPY/SWALLOW: CPT

## 2021-05-05 PROCEDURE — 99233 SBSQ HOSP IP/OBS HIGH 50: CPT | Performed by: INTERNAL MEDICINE

## 2021-05-05 PROCEDURE — 84484 ASSAY OF TROPONIN QUANT: CPT

## 2021-05-05 PROCEDURE — 99233 SBSQ HOSP IP/OBS HIGH 50: CPT | Performed by: NURSE PRACTITIONER

## 2021-05-05 PROCEDURE — 6370000000 HC RX 637 (ALT 250 FOR IP): Performed by: NURSE PRACTITIONER

## 2021-05-05 PROCEDURE — 92610 EVALUATE SWALLOWING FUNCTION: CPT

## 2021-05-05 PROCEDURE — 1200000000 HC SEMI PRIVATE

## 2021-05-05 PROCEDURE — 80069 RENAL FUNCTION PANEL: CPT

## 2021-05-05 RX ORDER — DEXTROSE AND SODIUM CHLORIDE 5; .45 G/100ML; G/100ML
INJECTION, SOLUTION INTRAVENOUS CONTINUOUS
Status: DISCONTINUED | OUTPATIENT
Start: 2021-05-05 | End: 2021-05-06 | Stop reason: HOSPADM

## 2021-05-05 RX ORDER — METOPROLOL SUCCINATE 50 MG/1
50 TABLET, EXTENDED RELEASE ORAL 2 TIMES DAILY
Status: DISCONTINUED | OUTPATIENT
Start: 2021-05-05 | End: 2021-05-06 | Stop reason: HOSPADM

## 2021-05-05 RX ORDER — VITAMIN B COMPLEX
1000 TABLET ORAL DAILY
Status: DISCONTINUED | OUTPATIENT
Start: 2021-05-05 | End: 2021-05-06 | Stop reason: HOSPADM

## 2021-05-05 RX ADMIN — Medication 10 ML: at 21:06

## 2021-05-05 RX ADMIN — DEXTROSE AND SODIUM CHLORIDE: 5; 450 INJECTION, SOLUTION INTRAVENOUS at 17:02

## 2021-05-05 RX ADMIN — APIXABAN 2.5 MG: 2.5 TABLET, FILM COATED ORAL at 08:23

## 2021-05-05 RX ADMIN — GUAIFENESIN AND DEXTROMETHORPHAN 5 ML: 100; 10 SYRUP ORAL at 04:09

## 2021-05-05 RX ADMIN — SODIUM CHLORIDE 1500 MG: 900 INJECTION INTRAVENOUS at 09:07

## 2021-05-05 RX ADMIN — METOPROLOL SUCCINATE 50 MG: 50 TABLET, EXTENDED RELEASE ORAL at 08:23

## 2021-05-05 RX ADMIN — APIXABAN 2.5 MG: 2.5 TABLET, FILM COATED ORAL at 21:06

## 2021-05-05 RX ADMIN — Medication 1000 UNITS: at 12:15

## 2021-05-05 RX ADMIN — ATORVASTATIN CALCIUM 40 MG: 40 TABLET, FILM COATED ORAL at 21:06

## 2021-05-05 RX ADMIN — METOPROLOL SUCCINATE 50 MG: 50 TABLET, EXTENDED RELEASE ORAL at 21:06

## 2021-05-05 RX ADMIN — SODIUM CHLORIDE 1500 MG: 900 INJECTION INTRAVENOUS at 21:06

## 2021-05-05 RX ADMIN — Medication 10 ML: at 08:23

## 2021-05-05 ASSESSMENT — PAIN SCALES - GENERAL
PAINLEVEL_OUTOF10: 0
PAINLEVEL_OUTOF10: 0

## 2021-05-05 NOTE — PROGRESS NOTES
[facility]   Cardiology    Dr Rios Bell MD, Delmy Reyes FNP APRN CVNP  Date: 5/5/2021  Admit Date: 5/2/2021         Interval Hx: Today, he is resting quietly VSS  afebrile on RA SV02 97% /  afib   Increase Toprol to BID with parameters   No new complaints today. No major events overnight. Denies having chest pain, palpitations, shortness of breath, orthopnea/PND, cough, or dizziness at the time of this visit.  sCr 2.5 stable  / on BB statin eliquis   Vit D low start supplement  /  magnesium WNL   to  [facility]   Treating ASCVD medically / pt is [facility]    Patient seen and examined. Clinical notes reviewed. Telemetry reviewed / testing reviewed  >29 minutes   Pertinent labs, diagnostic, device, and imaging results reviewed as a part of this visit  EKG TTE stress test: any LHC/RHC reviewed     CC:cp    History obtained from patient and medical record.   Primary cardiologist:  Dario Khan / Delonte Pope    HPI: Shamir Vega is a 80 y.o. male with a past medical history of persistent afib. CRDIII, SVT HTN admitted with c/o substernal cp after choking while eating, no c/o fever chills cough  n/v or SOB lasting 30 minutes. Pt is DNR-CC   VSS afebrile SV02 95% RA      PMH: HFpEF, CAF s/p unsuccessful AVJ ablation and Biotronik dual chamber PPM in 07/2011, follows with Dr. Delonte Pope, CKD (BL cr 1.8 - 2.0). The RA lead appears to be dislodged and undersensing afib. Device was changed to VVI to minimize RV pacing (was pacing the RV 90%) in 02/2019.   On eliquis and metoprolol     Echo 2017: LVEF 50%, moderate MR, nl RV, mild valve disease, RVSP 42  Nicki 2011: normal.    Echo 06/2019: mild LVH, LVEF 50-55%, indeterminate diastolic due to afib, mild RVD with mild dysfunction, moderate TR   Nicki 06/2019: normal   Patient was admitted 7/1 - 7/5/20 at [facility] for [address] [facility]  ECG 7/1/20: A. fib with controlled ventricular response, right bundle branch block    5/3/2021 TTE  Left ventricular cavity size is normal. Overall left ventricular systolic function appears normal with an estimated ejection fraction of 55-60%. Grossly normal wall motion but technically difficult due to afib with RVR. Mitral valve is structurally normal. There is mild mitral regurgitation.      Tests:   sCr 2.8 (range 1.6-2.8) neph following   Trop  0.06>0.05>0.06 >0.06   ProBNP 7490       Past Medical History:   Diagnosis Date    Acute on chronic diastolic (congestive) heart failure ([facility] [address] 07/01/2020    please see H&P and discharge summary of 7/5/20  for cardiological history.  Hypertension     SVT (supraventricular tachycardia) (HCC)         Past Surgical History:    has a past surgical history that includes Prostate surgery; sinus surgery; Carpal tunnel release; and IR KYPHOPLASTY LUMBAR 1 VERTEBRAL BODY (4/15/2021). Social History:  Reviewed. reports that he quit smoking about 26 years ago. His smoking use included pipe and cigars. He has never used smokeless tobacco. He reports current alcohol use. He reports that he does not use drugs. Allergies:  No Known Allergies    Family History:  Reviewed. family history includes Heart Disease in his sister; Kidney Disease in his sister. Denies family history of sudden cardiac death, arrhythmia, premature CAD    Home Meds:  Prior to Visit Medications    Medication Sig Taking?  Authorizing Provider   ELIQUIS 2.5 MG TABS tablet TAKE 1 TABLET BY MOUTH TWICE A DAY Yes LASHELL Barnes - CNP   furosemide (LASIX) 40 MG tablet Take 1 tablet by mouth 2 times daily Yes Taryn Mccollum MD   metoprolol succinate (TOPROL XL) 25 MG extended release tablet TAKE 1 TABLET EVERY DAY Yes Taryn Mccollum MD   timolol (TIMOPTIC-XE) 0.5 % ophthalmic gel-forming Place 1 drop into both eyes 2 times daily  Yes Historical Provider, MD   finasteride (PROSCAR) 5 MG tablet Take 5 mg by mouth daily  Yes Historical Provider, MD   simvastatin (ZOCOR) 40 MG tablet TAKE 1 TABLET IN THE EVENING Yes Historical Provider, MD        Scheduled Meds:   ampicillin-sulbactam  1,500 mg Intravenous Q12H    metoprolol succinate  50 mg Oral Daily    sodium chloride flush  10 mL Intravenous 2 times per day    apixaban  2.5 mg Oral BID    atorvastatin  40 mg Oral Nightly     Continuous Infusions:   sodium chloride       Vitals:    05/05/21 0739   BP: 121/82   Pulse: 135   Resp: 22   Temp: 98.7 °F (37.1 °C)   SpO2: 97%      In: 460 [P.O.:460]  Out: 500    Wt Readings from Last 3 Encounters:   05/05/21 147 lb 11.3 oz (67 kg)   04/14/21 158 lb (71.7 kg)   04/04/21 166 lb (75.3 kg)       Intake/Output Summary (Last 24 hours) at 5/5/2021 1117  Last data filed at 5/5/2021 0944  Gross per 24 hour   Intake 980 ml   Output 725 ml   Net 255 ml       Telemetry: Personally Reviewed    · Constitutional: Cooperative and in no apparent distress, and appears well nourished  · Skin: Warm and pink; no pallor, cyanosis, clubbing, or bruising   · HEENT: Symmetric and normocephalic. · Cardiovascular: iregular rate and rhythm. · Respiratory: Respirations symmetric and unlabored. Lungs clear to auscultation bilaterally, no wheezing, crackles, or rhonchi  · Gastrointestinal: Abdomen soft and round. Bowel sounds normoactive without tenderness or masses. · Musculoskeletal: Bilateral upper and lower extremity strength 5/5 with full ROM  · Neurologic/Psych: Awake and orientated to person, place and time. Calm affect, appropriate mood    Recent Labs     05/03/21  0441 05/04/21  0552 05/05/21  0200    138 137   K 3.8 3.3* 3.9    102 101   CO2 21 22 18*   PHOS  --   --  3.3   BUN 65* 62* 63*   CREATININE 2.8* 2.5* 2.5*   MG 2.40 2.30  --      Estimated Creatinine Clearance: 15 mL/min (A) (based on SCr of 2.5 mg/dL (H)).    CBC:   Recent Labs     05/02/21  1851 05/03/21  0442 05/04/21  0552   WBC 7.4 7.6 7.4   HGB 11.2* 11.1* 10.5*   HCT 34.0* 33.3* 31.9*   MCV 92.1 91.8 91.4    191 171     Thyroid:   Lab Results   Component Value Date TSH 2.51 06/18/2011     Lipids:   Lab Results   Component Value Date    CHOL 85 05/03/2021    HDL 33 05/03/2021    TRIG 56 05/03/2021     LFTS:   Lab Results   Component Value Date    ALT 33 05/04/2021    AST 28 05/04/2021    ALKPHOS 154 05/04/2021    PROT 6.8 05/04/2021    AGRATIO 0.8 05/04/2021    BILITOT 1.0 05/04/2021     Cardiac Enzymes:   Lab Results   Component Value Date    CKTOTAL 71 06/19/2011    CKMB 1.4 06/19/2011    TROPONINI 0.06 05/05/2021    TROPONINI 0.05 05/04/2021    TROPONINI 0.06 05/04/2021     Coags:   Lab Results   Component Value Date    PROTIME 18.7 05/02/2021    INR 1.60 05/02/2021       Patient Active Problem List    Diagnosis Date Noted    NSTEMI (non-ST elevation myocardial infarction) ([facility]     [facility]-ST elevated [facility]) [facility] 05/02/2021    Closed compression fracture of body of L1 vertebra (HCC) 04/04/2021    Chronic heart failure with preserved ejection fraction (HFpEF) ([facility] 07/01/2020    Chest pain 06/07/2019    Exertional dyspnea 06/07/2019    Localized edema 06/07/2019    Cardiac pacemaker in situ 03/08/2017    Bradycardia 03/08/2017    CKD (chronic kidney disease) stage 3, GFR 30-59 ml/min 06/21/2011    Acute non-ST-elevation MI following previous MI ([facility] 06/18/2011    Hypertension 06/18/2011    A-fib ([facility] 06/18/2011    Acute kidney injury superimposed on CKD ([facility] 06/18/2011        Assessment      admitted with c/o substernal cp after choking while eating, no c/o fever chills cough  n/v or SOB lasting 30 minutes.    Pt is DNR-CC   No c/o voiced today      Trop bump / flat in setting of DUSTIN/CRI / CHF    sCr 2.8 (range 1.6-2.8) neph following   Trop  0.06>0.05>0.06 >0.06   ProBNP 7490   TTE pending      Nicki 2011: normal.    Nicki 06/2019: normal      persistent afib  [id number] Score for Atrial Fibrillation Stroke Risk    Risk   Factors   Component Value   C CHF Yes 1   H HTN Yes 1   A2 Age >= 76 Yes,  (80 y.o.) 2   D DM No 0   S2 Prior Stroke/TIA No 0   V Vascular Disease Yes 1   A Age 74-69 No,  (80 y.o.) 0   Sc Sex male 0     [id number]  Score   5      CAF s/p unsuccessful AVJ ablation and Biotronik dual chamber PPM in 07/2011, follows with Dr. Anuradha Palm, CKD (BL cr 1.8 - 2.0). The RA lead appears to be dislodged and undersensing afib. Device was changed to VVI to minimize RV pacing (was pacing the RV 90%) in 02/2019. On eliquis and metoprolol        CRDIII  Follows neph       HTN   Stable wnl      HFpEF  Echo 2017: LVEF 50%, moderate MR, nl RV, mild valve disease, RVSP 42  Echo 06/2019: mild LVH, LVEF 50-55%, indeterminate diastolic due to afib, mild RVD with mild dysfunction, moderate TR       5/3/2021 TTE  Left ventricular cavity size is normal. Overall left ventricular systolic function appears normal with an estimated ejection fraction of 55-60%. Grossly normal wall motion but technically difficult due to afib with RVR. Mitral valve is structurally normal. There is mild mitral regurgitation.     Low Vit D   replace     Plan:   Trop bump / flat in setting of DUSTIN/CRI / CHF    Trop  0.06>0.05>0.06 >0.06 / ProBNP 7490    No new complaints today. No major events overnight.    sCr 2.5 stable  / on BB statin eliquis    HR afib V rate 110 increase:  Toprol 50 mg daily to BID with parameters    Vit D low start supplement  /  magnesium WNL    to [facility] soon   Treating ASCVD medically / pt is DNR-CC  conservative management with advanced age unless clinical symptoms change      Thank you for allowing to us to participate in the care of Britney Samuels. Jenny Hernándezkathbrittany APRN-CNP-CVNP    [address]   This patient is awake and alert. Some confusion and some degree of dementia. No chest pains. Still having some cough and his creatinine is still elevated at 2.5. Still having tachycardia up to 130 140 at times and is planning to go to [facility] soon.   He is on metoprolol at 50 mg twice daily and I believe his systolic blood pressure of 120 will allow us to increase that dose for better rate control. Will follow acutely.   Miranda Arce MD, [facility]

## 2021-05-05 NOTE — PROGRESS NOTES
midsternal although he states he was eating prior to onset and feels as though something became stuck in his throat, has been coughing since then and was coughing for EMS and on arrival to the emergency department is coughing and gasping, is saturating well on room air but is hypotensive and significantly tachycardic at 150, appears to be regular. EMS EKG concerning for possible concordant ST elevation in V3 and V4 with discordant ST depression in lead I however repeat EKG on arrival in the emergency department looks very similar to patient's previous that also demonstrated right bundle branch block. \"     Impression  Dysphagia Diagnosis: Suspected needs further assessment  Dysphagia Impression : Pt alert, oriented, follows commands and answers questions appropriately. RN concerned for aspiration, as pt coughing with breakfast and spiked fever last night, CXR is pending. Pt coughing prior to evaluation, wet/congested sounding cough. Pt reports he had choked prior to coming into hospital, this was noted in admission noted. Oral structures intact, no asymmetry noted. Pt analyzed with puree, thin liquids via cup and straw as well as soft solid. Pt did not demonstrate immediate reactive cough after any trials, though coughing occuring throughout session, therefore difficult to determine whether related to aspiration. Pt demonstrated prolonged but adequate mastication with soft solid. Recommend MBS to fully assess swallow function, pt agreeable  Dysphagia Outcome Severity Scale: Level 4: Mild moderate dysphagia- Intermittent supervision/cueing. One - two diet consistencies restricted     Treatment Plan  Requires SLP Intervention: Yes  Duration/Frequency of Treatment: TBD  D/C Recommendations:  To be determined     Recommended Diet and Intervention  Diet Solids Recommendation: (TBD after MBS)  Recommended Form of Meds: (TBD after MBS)  Recommendations: Modified barium swallow study  Therapeutic Interventions: Oral care;Patient/Family education    Compensatory Swallowing Strategies  TBD    Treatment/Goals  1- The patient will tolerate instrumental swallowing procedure  2-The patient/caregiver will demonstrate understanding of dysphagia recommendations/compensatory strategies for improved swallowing safety. General  Chart Reviewed: Yes  Behavior/Cognition: Alert; Cooperative;Pleasant mood  Respiratory Status: Room air  Breath Sounds: (CXR pending)  Communication Observation: Functional  Follows Directions: Simple  Dentition: Some missing teeth  Patient Positioning: Upright in bed  Baseline Vocal Quality: Normal  Volitional Cough: Strong  Prior Dysphagia History: none  Consistencies Administered: Dysphagia Soft and Bite-Sized (Dysphagia III); Thin - cup; Thin - straw;Dysphagia Pureed (Dysphagia I)    Vision/Hearing  Vision  Vision: Impaired  Vision Exceptions: Wears glasses for reading  Hearing  Hearing: Within functional limits    Oral Motor Deficits  Oral/Motor  Oral Motor: Within functional limits    Oral Phase Dysfunction  Pt demonstrated prolonged but adequate mastication with soft solid     Indicators of Pharyngeal Phase Dysfunction  Pt coughing prior to evaluation, wet/congested sounding cough. Pt reports he had choked prior to coming into hospital, this was noted in admission noted. Oral structures intact, no asymmetry noted. Pt analyzed with puree, thin liquids via cup and straw as well as soft solid. Pt did not demonstrate immediate reactive cough after any trials, though coughing occuring throughout session, therefore difficult to determine whether related to aspiration.      Prognosis  Prognosis  Prognosis for safe diet advancement: fair  Barriers to reach goals: age(possible s/s of aspiration)  Individuals consulted  Consulted and agree with results and recommendations: Patient;RN    Education  Patient Education: pt educated to purpose of visit  Patient Education Response: Verbalizes understanding  Safety Devices in place: Yes  Type of devices: Call light within reach       Therapy Time  SLP Individual Minutes  Time In: 0900  Time Out: 0915  Minutes: 15     Plan:  Recommended diet:  TBD after MBS  MBS today  Dc recommendation: TBD  Pt therapy goal: not stated  Pt dc goal: to go home and get better  Andrea Matthews M.S./[facility]-SLP #[id number]  Pg.  # [phone]  Needs met prior to leaving room, call light within reach, d/w NANCY Le  This document will serve as a dc summary if pt dc prior to next visit  5/5/2021 9:22 AM

## 2021-05-05 NOTE — PROCEDURES
INSTRUMENTAL SWALLOW REPORT  MODIFIED BARIUM SWALLOW/treatment    NAME: Madisyn Beckett   : 1921  MRN: [id number]       Date of Eval: 2021     Ordering Physician: Dr. Jonathan Mcclure  Radiologist: Dr. Heather Castro     Referring Diagnosis(es): Referring Diagnosis: NSTEMI    Past Medical History:  has a past medical history of Acute on chronic diastolic (congestive) heart failure ([address] Hypertension, and SVT (supraventricular tachycardia) ([address] Past Surgical History:  has a past surgical history that includes Prostate surgery; sinus surgery; Carpal tunnel release; and IR KYPHOPLASTY LUMBAR 1 VERTEBRAL BODY (4/15/2021). Current Diet Solid Consistency: NPO  Current Diet Liquid Consistency: NPO     CXR 21     Clear lungs.       Stable cardiac mediastinal silhouette with left subclavian dual-lead pacemaker.       L1 vertebral augmentation cement noted.         Type of Study: Initial MBS     Patient Complaints/Reason for Referral:  Madisyn Beckett was referred for a MBS to assess the efficiency of his/her swallow function, assess for aspiration, and to make recommendations regarding safe dietary consistencies, effective compensatory strategies, and safe eating environment. Patient complaints: none    Onset of problem:   Date of Onset: 21    Behavior/Cognition/Vision/Hearing:  Behavior/Cognition: Alert; Cooperative  Vision: Impaired  Vision Exceptions: Wears glasses for reading  Hearing: Within functional limits    Impressions:  Pt exhibiting severe oropharyngeal dysphagia with risk for aspiration of all consistencies. Pt exhibiting decreased laryngeal elevation, decreased tongue base retraction, reduced pharyngeal peristalsis. This results in max residue in valleculae, pyriforms and on posterior pharyngeal wall. Initially, pt tolerated thin liquids via tsp and cup with no pen/aspiration, however aspiration occurred with use of straw, with no cough reflex.   Puree and soft solid were consumed with no penetration, however max residue that was was building occurred with aspiration after the swallow. Pt was not sensate to the residue. When instructed to produce effortful swallow, residue cleared minimally. Head turns to right and left did not aide in reducing residue and continued aspiration occurred. Additional trials of thin via cup were presented in attempt to clear residue, however ongoing aspiration occurred from the residue. Treatment Dx and ICD 10: severe oropharyngeal dysphagia    Patient Position: Lateral and Patient Degrees: 90  Consistencies Administered: Dysphagia Soft and Bite-Sized (Dysphagia III); Thin cup; Thin teaspoon; Thin straw;Dysphagia Pureed (Dysphagia I)    Compensatory Swallowing Strategies Attempted: Upright as possible for all oral intake;Effortful swallow  Postural Changes and/or Swallow Maneuvers Trialed: Head turn left; Head turn right    Dysphagia Outcome Severity Scale: Level 2: Moderate Severe dysphagia- Maximum assistance or maximum use of strategies with partial PO only  Penetration-Aspiration Scale (PAS): 7 - Material enters the airway, passes below the vocal folds, and is not ejected from the trachea despite effort    Recommended Diet:  Options for NPO v QOL:  1)  PEG placement with Strict NPO    -or-  2) PEG placement while allowing Pt to have small amounts of PO for pleasure.   -or-  3) Allow pt to eat safest diet which would be clear liquids , with strict aspiration precautions and strategies with the knowledge that pt is at high risk for aspirating all PO   -or-  4) Allow pt to consume whatever he/she wishes, with strict aspiration precautions and strategies with the knowledge that pt is at high risk for aspirating all PO    Safe Swallow Protocol: if choosing PO  Alternate solids and liquids  Eat/Feed slowly  Upright as possible for all oral intake  Effortful swallow  Remain upright for 30-45 minutes after meal  Small bites/sips    Recommendations/Treatment  Requires however aspiration occurred with use of straw, with no cough reflex. Puree and soft solid were consumed with no penetration, however max residue that was was building occurred with aspiration after the swallow. Pt was not sensate to the residue. When instructed to produce effortful swallow, residue cleared minimally. Head turns to right and left did not aide in reducing residue and continued aspiration occurred. Additional trials of thin via cup were presented in attempt to clear residue, however ongoing aspiration occurred from the residue. Esophageal Phase  Not able to view adequately    Pain   Patient Currently in Pain: No    Therapy Time:   Individual Concurrent Group Co-treatment   Time In 0955         Time Out 1020         Minutes 25            Plan:  · Diet  Recommendation: TBD pending pt/family decision regarding nutrition options  · Recommend ice chips/sips of water until decision made, for pt comfort and to maintain integrity of swallow mechanism  · Consider palliative care consult to aide in family decision making  Dc recommendation: TBRUFINA Abrams M.S./[facility] #[id number]  Pg.  # [id number]  Needs met prior to leaving radiology, communicated to rad techs leaving dept and pt status  Results communicated to NANCY Baker   5/5/2021, 10:21 AM

## 2021-05-05 NOTE — PROGRESS NOTES
Dr. Kevin Marks at beside this morning while pt eating breakfast and coughing. I spoke to him about my concern for aspiration. Pt spiked low grade fever last night per night shift RN. Afebrile this morning. CXR ordered and pt NPO for now until SLP birgit. Will continue to monitor.

## 2021-05-05 NOTE — PROGRESS NOTES
Unasyn 1.5g q6h ordered for patient for aspiration PNA. This medication is renally eliminated. Will change to Unasyn 1.5g q12h per renal dose adjustment policy. Estimated Creatinine Clearance: 15 mL/min (A) (based on SCr of 2.5 mg/dL (H)). Pharmacy will continue to monitor renal function and adjust dose as necessary. Please call with any questions. Thanks!   Lacie Zamora PharmD, [facility] 5/5/2021 8:48 AM  [phone]

## 2021-05-05 NOTE — PROGRESS NOTES
Progress Note    Date:5/5/2021       Room:6304/6304-01  Patient Name:German Hall     YOB: 1921     Age:89 y.o. Assessment        Hospital Problems           Last Modified POA    * (Principal) NSTEMI (non-ST elevated myocardial infarction) ([facility] 5/3/2021 Yes    A-fib ([facility] 5/3/2021 Yes    Acute kidney injury superimposed on CKD ([facility] 5/3/2021 Yes    Chest pain 5/2/2021 Yes    NSTEMI (non-ST elevation myocardial infarction) ([facility] 5/3/2021 Yes          Plan:        Aspiration ? Coughing this am with breakfast.  Per RN cough whole night. Low grade fever. Rhonchi on exam.  Start Unasyn, SLP eval, keep NPO  CXR    Afib with RVR  Continue Metoprolol  Anticoagulated with apixaban  Discussed with Dr Judy Gilbert    ROSALINO on CKD  Improved. Holding ACEi and lasix. Cr close to baseline  Discussed with Nephrology     Chest pain  Improved  Per cardiology conservative management. Subjective   Interval History Status: not changed. Cough +  Low grade fever   Concern for aspiration. Review of Systems   Review of Systems    Medications   Scheduled Meds:    ampicillin-sulbactam  1,500 mg Intravenous Q6H    metoprolol succinate  50 mg Oral Daily    sodium chloride flush  10 mL Intravenous 2 times per day    apixaban  2.5 mg Oral BID    atorvastatin  40 mg Oral Nightly     Continuous Infusions:    sodium chloride       PRN Meds: guaiFENesin-dextromethorphan, sodium chloride flush, sodium chloride, potassium chloride, magnesium sulfate, promethazine **OR** ondansetron, famotidine, acetaminophen **OR** acetaminophen    Past History    Past Medical History:   has a past medical history of Acute on chronic diastolic (congestive) heart failure ([facility] Hypertension, and SVT (supraventricular tachycardia) ([facility] Social History:   reports that he quit smoking about 26 years ago. His smoking use included pipe and cigars. He has never used smokeless tobacco. He reports current alcohol use.  He reports results for input(s): APTT in the last 72 hours. LIVER PROFILE:  Recent Labs     05/03/21  0441 05/04/21  0552   AST 39* 28   ALT 43* 33   BILITOT 1.0 1.0   ALKPHOS 135* 154*       Imaging Last 24 Hours:  Echo Limited    Result Date: 5/3/2021  Transthoracic Echocardiography Report (TTE)  Demographics   Patient Name       Yasmeen Zayas   Date of Study      05/03/2021         Gender              Male   Patient Number     [id number]         Date of Birth       11/20/1921   Visit Number       [id number]          Age                 80 year(s)   Accession Number   [id number]         Room Number         1825   Corporate ID       [id number]           [address], [place],                                                            [address] [facility]   Ordering Physician Declan Guerra,  Interpreting        Xiomara Carr MD                     DO                 Physician  Procedure Type of Study   TTE procedure:ECHOCARDIOGRAM LIMITED. Procedure Date Date: 05/03/2021 Start: 11:45 AM Study Location: [facility] - Echo Lab Technical Quality: Adequate visualization Indications:Chest pain. Additional Indications:NSTEMI. Patient Status: Routine Height: 71 inches Weight: 158 pounds BSA: 1.91 m2 BMI: 22.04 kg/m2 BP: 122/63 mmHg  Conclusions   Summary  Limited echo. Left ventricular cavity size is normal. Overall left ventricular systolic  function appears normal with an estimated ejection fraction of 55-60%. Grossly normal wall motion but technically difficult due to afib with RVR. Mitral valve is structurally normal. There is mild mitral regurgitation.    Signature   ------------------------------------------------------------------  Electronically signed by Xiomara Carr MD (Interpreting  physician) on 05/03/2021 at 01:29 PM  ------------------------------------------------------------------   Findings   Left Ventricle  Left ventricular cavity size is normal. Overall left ventricular systolic  function

## 2021-05-05 NOTE — PROGRESS NOTES
Patient Name: Brendon Gilbert                                                    Primary Physician: Hunter Calixto DO  Admitting Dx: NSTEMI (non-ST elevated myocardial infarction) [facility]) [I21.4]  Chest pain [R07.9]    Dr. Maxine Martinez      Nephrology [facility] [place] [place] Progress Note  [facility]  [email or web address] [employer]                                       Interval Plan:     · Labs pending. Ordered for tomorrow. SCr  2.5 holding diuretics and Lisinopril. NO plans for dialysis. · Fair UOP of 550 cc. · K is low and replaced. · Hgb 10.5. No changes. · BP better 121/82 and prefer 130/80s. On Metoprolol 50 mg qd. No changes given elevated HR at this time. Assessment:     Stage IV CKD with ROSALINO  · SCr was 2.8 on admission from 2.4 last month. Holding diuretics and BP meds including Lisinopril. No plans for dialysis. · Hx of CRS with fluid compression on renal vessels leading to the eventual ROSALINO  · Increase in Cr levels from 1.8 on 2/18/19 to 2.3 on previous admission  · Previously on Lasix 40mg IV BID b/c of fluid overload which is not an issue currently. CHF (last echo 6/14/19 EF of 50-55%, no RWMA, RVH with mildly depressed systolic function)  · No current edema and holding on diuresis with Lasix 40mg IV BID  · Continue metoprolol 25mg   · NSTEMI with Cardiology following. Very high risk for LHC / COntrast.   HTN  · BP has been soft with 24F-262H systolic and remains off Lisinopril with previous admission and ROSALINO  · Continue metoprolol 25mg  Anemia  · Drop in Hgb from 11.1 and no ONEAL. · Check Iron studies    Please call our office at [phone] or [employer] with any questions or contact me directly. Subjective:     CC / Reason for Consult:  CKD 4    HPI/PMH:    Brendon Gilbert is a 80 y.o. male admitted for Chest pain (R07.9) and NSTEMI (I21.4) with PMHx of CKD 4, HTN, CHF, Edema, Anemia, SHPT, NSTEMI. Patient baseline SCr fluctuation with volume status and CHF.   Previously taken off ACEi for ROSALINO. Comes in now hypotensive. Admitted with chest pain and transferred from facility and was DNR CCA on admission.         ROS / Interval Hx: Patient seen in room. Resting in bed with no family or nursing. Coughing up phlegm but breathing is fine and states he is eating. Objective:        Gen: alert, well appearing, and in no distress and oriented to person, place, and time     Neck:  supple, no significant adenopathy     Cardio: normal rate, regular rhythm, normal S1, S2, no murmurs, rubs, clicks or gallops      Resp: clear to auscultation, no wheezes, rales or rhonchi, symmetric air entry. GI:  soft, nontender, nondistended, no masses or organomegaly. Ext:  peripheral pulses normal, no pedal edema, no clubbing or cyanosis      MS: no joint tenderness, deformity or swelling      DERM: normal coloration and turgor, no rashesor bruising    Vitals:     /82   Pulse 135   Temp 98.7 °F (37.1 °C) (Oral)   Resp 22   Ht 5' 11.5\" (1.816 m)   Wt 147 lb 11.3 oz (67 kg)   SpO2 97%   BMI 20.31 kg/m²      I/Os: Reviewed    Meds: Reviewed. Please see plan for changes made.     Labs:    Lab Results   Component Value Date    WBC 7.4 05/04/2021    HGB 10.5 05/04/2021    HCT 31.9 05/04/2021    MCV 91.4 05/04/2021     05/04/2021      Lab Results   Component Value Date    CREATININE 2.5 05/04/2021    BUN 62 05/04/2021     05/04/2021    K 3.3 05/04/2021     05/04/2021    CO2 22 05/04/2021     No components found for: GLU   Lab Results   Component Value Date    PTH 47.7 07/02/2020    CALCIUM 9.0 05/04/2021    PHOS 3.7 11/05/2019      No results found for: [id number]   Lab Results   Component Value Date    IRON 75 07/01/2020    TIBC 236 07/01/2020    FERRITIN 133.0 07/01/2020      No results found for: Mary Luo

## 2021-05-06 VITALS
WEIGHT: 151.9 LBS | RESPIRATION RATE: 18 BRPM | OXYGEN SATURATION: 98 % | DIASTOLIC BLOOD PRESSURE: 79 MMHG | BODY MASS INDEX: 20.57 KG/M2 | TEMPERATURE: 97.7 F | HEIGHT: 72 IN | HEART RATE: 124 BPM | SYSTOLIC BLOOD PRESSURE: 127 MMHG

## 2021-05-06 LAB
ANION GAP SERPL CALCULATED.3IONS-SCNC: 15 MMOL/L (ref 3–16)
BUN BLDV-MCNC: 66 MG/DL (ref 7–20)
CALCIUM SERPL-MCNC: 9.3 MG/DL (ref 8.3–10.6)
CHLORIDE BLD-SCNC: 103 MMOL/L (ref 99–110)
CO2: 21 MMOL/L (ref 21–32)
CREAT SERPL-MCNC: 2.6 MG/DL (ref 0.8–1.3)
GFR AFRICAN AMERICAN: 28
GFR NON-AFRICAN AMERICAN: 23
GLUCOSE BLD-MCNC: 107 MG/DL (ref 70–99)
POTASSIUM SERPL-SCNC: 3.6 MMOL/L (ref 3.5–5.1)
SODIUM BLD-SCNC: 139 MMOL/L (ref 136–145)

## 2021-05-06 PROCEDURE — 2580000003 HC RX 258: Performed by: INTERNAL MEDICINE

## 2021-05-06 PROCEDURE — 80048 BASIC METABOLIC PNL TOTAL CA: CPT

## 2021-05-06 PROCEDURE — 6370000000 HC RX 637 (ALT 250 FOR IP): Performed by: NURSE PRACTITIONER

## 2021-05-06 PROCEDURE — 36415 COLL VENOUS BLD VENIPUNCTURE: CPT

## 2021-05-06 PROCEDURE — 99233 SBSQ HOSP IP/OBS HIGH 50: CPT | Performed by: INTERNAL MEDICINE

## 2021-05-06 PROCEDURE — 99221 1ST HOSP IP/OBS SF/LOW 40: CPT | Performed by: NURSE PRACTITIONER

## 2021-05-06 PROCEDURE — 97110 THERAPEUTIC EXERCISES: CPT

## 2021-05-06 PROCEDURE — 97535 SELF CARE MNGMENT TRAINING: CPT

## 2021-05-06 PROCEDURE — 6360000002 HC RX W HCPCS: Performed by: INTERNAL MEDICINE

## 2021-05-06 PROCEDURE — 92526 ORAL FUNCTION THERAPY: CPT

## 2021-05-06 PROCEDURE — 6370000000 HC RX 637 (ALT 250 FOR IP): Performed by: INTERNAL MEDICINE

## 2021-05-06 PROCEDURE — 99232 SBSQ HOSP IP/OBS MODERATE 35: CPT | Performed by: NURSE PRACTITIONER

## 2021-05-06 RX ORDER — AMOXICILLIN AND CLAVULANATE POTASSIUM 500; 125 MG/1; MG/1
1 TABLET, FILM COATED ORAL EVERY 24 HOURS
Qty: 7 TABLET | Refills: 0 | Status: SHIPPED | OUTPATIENT
Start: 2021-05-06 | End: 2021-05-13

## 2021-05-06 RX ORDER — METOPROLOL SUCCINATE 50 MG/1
50 TABLET, EXTENDED RELEASE ORAL 2 TIMES DAILY
Qty: 30 TABLET | Refills: 3 | Status: SHIPPED | OUTPATIENT
Start: 2021-05-06

## 2021-05-06 RX ADMIN — Medication 10 ML: at 08:55

## 2021-05-06 RX ADMIN — APIXABAN 2.5 MG: 2.5 TABLET, FILM COATED ORAL at 08:54

## 2021-05-06 RX ADMIN — Medication 1000 UNITS: at 08:54

## 2021-05-06 RX ADMIN — METOPROLOL SUCCINATE 50 MG: 50 TABLET, EXTENDED RELEASE ORAL at 08:54

## 2021-05-06 RX ADMIN — SODIUM CHLORIDE 1500 MG: 900 INJECTION INTRAVENOUS at 08:54

## 2021-05-06 ASSESSMENT — PAIN SCALES - GENERAL: PAINLEVEL_OUTOF10: 0

## 2021-05-06 NOTE — CARE COORDINATION
Case Management Assessment            Discharge Note                    Date / Time of Note: 5/6/2021 3:29 PM                  Discharge Note Completed by: Sonya Charanjiteloy    Patient Name: Franki Collet   YOB: 1921  Diagnosis: NSTEMI (non-ST elevated myocardial infarction) [facility]) [I21.4]  Chest pain [R07.9]   Date / Time: 5/2/2021  6:30 PM    Current PCP: Monse White MD  Clinic patient: No    Hospitalization in the last 30 days: Yes    Advance Directives:  Code Status: DNR-CCA  [facility] DNR form completed and on chart: Yes    Financial:  Payor: Jean Claude Lares / Plan: Amanda Galicia PPO / Product Type: Medicare /      Pharmacy:    [id number] [place] [address]  Phone: [phone] Fax: [phone]      Assistance purchasing medications?: Potential Assistance Purchasing Medications: No  Assistance provided by Case Management: None at this time    Does patient want to participate in local refill/ meds to beds program?: No    Meds To Beds General Rules:  1. Can ONLY be done Monday- Friday between 8:30am-5pm  2. Prescription(s) must be in pharmacy by 3pm to be filled same day  3. Copy of patient's insurance/ prescription drug card and patient face sheet must be sent along with the prescription(s)  4. Cost of Rx cannot be added to hospital bill. If financial assistance is needed, please contact unit  or ;  or  CANNOT provide pharmacy voucher for patients co-pays  5.  Patients can then  the prescription on their way out of the hospital at discharge, or pharmacy can deliver to the bedside if staff is available. (payment due at time of pick-up or delivery - cash, check, or card accepted)     Able to afford home medications/ co-pay costs: LTC    ADLS:  Current PT AM-PAC Score: 16 /24  Current OT AM-PAC Score: 16 /24      DISCHARGE Disposition: [facility] (LTC): [facility]  Phone: [phone]  Fax: [phone]    LOC at discharge: [facility]ita Smith Completed: Yes    Notification completed in HENS/PAS?: Going back as LTC    IMM Completed:   Yes, Case management has presented and reviewed IMM letter #2 to the patient and/or family/ POA. Patient and/or family/POA verbalized understanding of their medicare rights and appeal process if needed. Patient and/or family/POA has signed, initialed and placed today's date (5-6-21) and time (999) on IMM letter #2 on the the appropriate lines. Patient and/or family/POA, copy of letter offered and they are aware that this original copy of IMM letter #2 is available prior to discharge from the paper chart on the unit. Electronic documentation has been entered into epic for IMM letter #2 and original paper copy has been added to the paper chart at the nurses station. Called patient's daughter for verbal IMM, but unavailable. Transportation:  Transportation PLAN for discharge: EMS transportation   Mode of Transport: Ambulance stretcher - BLS  Reason for medical transport: Other: ltc-hospice  Name of [address]: [address]  Phone: [phone]  [facility] [address] [place] [facility] [place] [facility] completed: Yes    Home Care:  [address] ordered at discharge: Not [address]: Not Applicable  Orders faxed: No    Durable Medical Equipment:  DME Provider: n/a  Equipment obtained during hospitalization: none      Hospice Services: Following to [facility]  Location: [address] [place] [address]: [facility]  Phone: [phone]    Consents signed: Not Indicated      Additional CM Notes: [facility] going to [facility] pending medicaid at 5:30 today by [facility] for LTC placement and [facility] will follow.      The Plan for Transition of Care is related to the following treatment goals of NSTEMI (non-ST elevated myocardial infarction) [facility]) [I21.4]  Chest pain [R07.9]    The Patient and/or patient representative Levert Boxer and his family were provided with a choice of provider and agrees with the discharge plan Yes    Freedom of choice list was provided with basic dialogue that supports the patient's individualized plan of care/goals and shares the quality data associated with the providers.  Yes    Care Transitions patient: No    Lino Browning RN  [facility]  Case Management Department  Ph: [phone]

## 2021-05-06 NOTE — PROGRESS NOTES
Occupational Therapy  Facility/Department: [address]  Daily Treatment Note  NAME: Madisyn Beckett  : 1921  MRN: [id number]    Date of Service: 2021    Discharge Recommendations:  Madisyn Beckett scored a 16/24 on the AM-PAC ADL Inpatient form. Current research shows that an AM-PAC score of 17 or less is typically not associated with a discharge to the patient's home setting. Based on the patient's AM-PAC score and their current ADL deficits, it is recommended that the patient have 3-5 sessions per week of Occupational Therapy at d/c to increase the patient's independence. Please see assessment section for further patient specific details. If patient discharges prior to next session this note will serve as a discharge summary. Please see below for the latest assessment towards goals. OT Equipment Recommendations  Equipment Needed: No  Other: defer    Assessment   Performance deficits / Impairments: Decreased functional mobility ; Decreased ADL status; Decreased endurance;Decreased balance;Decreased strength;Decreased posture;Decreased high-level IADLs;Decreased cognition  After treatment, pt found to be presenting with the above mentioned deficits. Pt would benefit from continued skilled occupational therapy to address these deficits, increasing safety and independence with ADL and functional mobility. He currently needs min A to complete functional t/fs from bed to adjacent chair with RW, max A for LB dressing, and set-up for seated grooming. Pt with low activity tolerance requesting to get back to bed at end of session. Will continue to assess for discharge needs.      Treatment Diagnosis: Impaired ADL, functional mobility, and functional activity tolerance  Prognosis: Good  REQUIRES OT FOLLOW UP: Yes  Activity Tolerance  Activity Tolerance: Patient limited by fatigue;Patient Tolerated treatment well  Activity Tolerance: Seated vitals after t/f to chair: 134/82, 98% on RA, assistance  Stand to sit: Minimal assistance  Transfer Comments: VCs for safe t/f technique and safe use of RW                          Cognition  Overall Cognitive Status: Exceptions  Arousal/Alertness: Delayed responses to stimuli  Following Commands: Follows one step commands consistently  Attention Span: Appears intact  Safety Judgement: Decreased awareness of need for safety  Problem Solving: Decreased awareness of errors  Insights: Decreased awareness of deficits  Initiation: Requires cues for some  Sequencing: Requires cues for some                                         Therapeutic Exercise  Pt completed 1 set, 10 reps of B ankle, knee, digit, and elbow flex/ext while seated after education. Pt educated to complete at least 3x/day, 10 reps increasing resistance and repetitions as able.  Pt stated and demonstrated understanding, limited motivation    Education: Role of OT, safe t/f training, safe use of DME, awareness of deficits, discharge planning, ADL as therapeutic exercise, importance of OOB     Plan   Plan  Times per week: 2-5  Current Treatment Recommendations: Strengthening, Balance Training, Functional Mobility Training, Endurance Training, Self-Care / ADL, Safety Education & Training, Patient/Caregiver Education & Training    AM-PAC Score        AM-[facility] Inpatient Daily Activity Raw Score: 16 (05/06/21 1234)  AM-PAC Inpatient ADL T-Scale Score : 35.96 (05/06/21 1234)  ADL Inpatient CMS 0-100% Score: 53.32 (05/06/21 1234)  ADL Inpatient CMS G-Code Modifier : CK (05/06/21 [facility])    Goals  Short term goals  Time Frame for Short term goals: Discharge - cont all goals 5/6/21  Short term goal 1: Transfer to/from toilet with SBA  Short term goal 2: Stance with SBA x5 min while engaging in ADL/functional mobility  Short term goal 3: Lower body dressing with min assist  Patient Goals   Patient goals : Pt states that his daughter wants him to go to a NH       Therapy Time   Individual Concurrent Group

## 2021-05-06 NOTE — CARE COORDINATION
TRAVIS Marcum, Palliative Care, called and patient's family wants [facility] today in the case. Plans are for patient to return to [facility] pending. This CM contacting [facility] to reopen case for patient and confirming with Leola about these plans. Left a message to return call today. CM will continue to follow patient until discharge. Electronically signed by Kamini Ohara RN on 5/6/2021 at 1:19 PM     Addendum:  Spoke to Leola [facility]) and they will accept patient back as LTC pending medicaid and have [facility]. Spoke to Reena and finding out from Dr. Chico Flower ([facility]) when he will be medically ready to discharge. Awaiting [employer] response. Electronically signed by Kamini Ohara RN on 5/6/2021 at 1:47 PM     Addendum:  Dr. Chico Flower responded. Patent being discharged and Reena is coming out to hospital today to assess, complete with all needs and getting transport for patient to [facility].  Electronically signed by Kamini Ohara RN on 5/6/2021 at 1:55 PM

## 2021-05-06 NOTE — CONSULTS
[facility]  Palliative Medicine Consultation Note      Date Of Admission:5/2/2021  Date of consult: 05/06/21  Seen by [facility] in the past:  No    Recommendations:        Called to speak with [facility], as the pt was enrolled prior to admission. They received a referral from pt's physician Dr. Jeyson Couch on 4/27 and he was enrolled in hospice with the diagnosis of protein calorie malnutrition and heart failure. At the time he was living in a Senior Living apartment independently. He was discharged from the services on 4/30, as they had been notified that the pt was at a skilled nursing facility which made him no longer eligible for hospice. They do not have a HCPOA on file, however pt's daughter Luis Alfredo Fernandez was listed as their only contact. Saw the pt at the bedside. [facility] knows he came here for \"choking\". Otherwise he is a poor historian. He does state that he has had difficulty caring for himself at home, and his daughter Luis Alfredo Fernadnez, who is his only child, has been working on getting him more assistance. Called pt's daughter Luis Alfredo Fernandez, who confirmed that she is the pt's only child. Discussed the results of the MBS yesterday. We discussed re-enrolling the pt with hospice, to which Luis Alfredo Fernandez is agreeable. She requests another referral to [facility]. D/w Dr. Janee Bryant. Left  for CM RN. 1. Goals of Care/Advanced Care planning/Code status: DNRCCA. Discuss goals of care with pt's daughter today. She is agreeable to hospice. 2. Pain: Pt denies  3. SOB: Pt denies  4. Dysphagia: Concerns for aspiration on all consistencies during MBS yesterday. Pt presented to the hospital with an episode of \"choking\" and chest pain. Discussed allowing the pt to eat for comfort, re-enrolling with hospice as above.    5. Disposition: Medicaid pending for LTC at [facility] per  note, LTC with hospice referral    Reason for Consult:         [x]  Goals of Care  [x]  Code Status Discussion/Advanced Care Planning   [x]  Psychosocial/Family Support  []  Symptom Management  []  Other (Specify)    Requesting Physician: Dr. Kirsten Escobar:  Chest Pain    History Obtained From:  patient, electronic medical record    History of Present Illness:         Yariel Vaughan is a 80 y.o. male with PMH of afib on eliquis, CKD stg III, SVT, HTN who presented with chest pain. Pt reported that he was eating something, choked and then started having chest pain. He was noted to be tachycardic, and was given nitro and aspirin without change in his chest pain. Subjective:         Past Medical History:        Diagnosis Date    Acute on chronic diastolic (congestive) heart failure ([facility] [address] 07/01/2020    please see H&P and discharge summary of 7/5/20  for cardiological history.  Hypertension     SVT (supraventricular tachycardia) (HCC)        Past Surgical History:        Procedure Laterality Date    CARPAL TUNNEL RELEASE      IR KYPHOPLASTY LUMBAR FIRST LEVEL  4/15/2021    IR KYPHOPLASTY LUMBAR FIRST LEVEL 4/15/2021 [facility] PROCED[facility]    PROSTATE SURGERY      SINUS SURGERY         Current Medications:    Medications Prior to Admission: ELIQUIS 2.5 MG TABS tablet, TAKE 1 TABLET BY MOUTH TWICE A DAY  furosemide (LASIX) 40 MG tablet, Take 1 tablet by mouth 2 times daily  metoprolol succinate (TOPROL XL) 25 MG extended release tablet, TAKE 1 TABLET EVERY DAY  timolol (TIMOPTIC-XE) 0.5 % ophthalmic gel-forming, Place 1 drop into both eyes 2 times daily   finasteride (PROSCAR) 5 MG tablet, Take 5 mg by mouth daily   simvastatin (ZOCOR) 40 MG tablet, TAKE 1 TABLET IN THE EVENING    Allergies:  Patient has no known allergies. Social History:    · TOBACCO: reports that he quit smoking about 26 years ago. His smoking use included pipe and cigars. He has never used smokeless tobacco.  · ETOH:   reports current alcohol use.   · Patient currently lives alone, planning to transition to LTC prior to admission    Review of Systems -   Review of Systems: A 10 point review of systems was conducted, significant findings as notedin HPI. Objective:          Physical Exam  Constitutional:       General: He is not in acute distress. Cardiovascular:      Rate and Rhythm: Normal rate. Heart sounds: Normal heart sounds. Pulmonary:      Effort: Pulmonary effort is normal.      Breath sounds: Normal breath sounds. Abdominal:      General: Bowel sounds are normal.      Palpations: Abdomen is soft. Musculoskeletal:      Right lower leg: No edema. Left lower leg: No edema. Skin:     General: Skin is warm and dry. Neurological:      Mental Status: He is alert and oriented to person, place, and time. Palliative Performance Scale:  [x] 60% Ambulation reduced; Significant disease; Can't do hobbies/housework; intake normal or reduced; occasional assist; LOC full/confusion  [] 50% Mainly sit/lie; Extensive disease; Can't do any work; Considerable assist; intake normal  Or reduced; LOC full/confusion  [] 40% Mainly in bed; Extensive disease; Mainly assist; intake normal or reduced; occasional assist; LOC full/confusion  [] 30% Bed Bound; Extensive disease; Total care; intake reduced; LOC full/confusion  [] 20% Bed Bound; Extensive disease; Total care; intake minimal; Drowsy/coma  [] 10% Bed Bound; Extensive disease;  Total care; Mouth care only; Drowsy/coma  [] 0% Death    PPS: 60    Vitals:    /69   Pulse 91   Temp 98 °F (36.7 °C) (Oral)   Resp 18   Ht 5' 11.5\" (1.816 m)   Wt 151 lb 14.4 oz (68.9 kg)   SpO2 99%   BMI 20.89 kg/m²     Labs:    BMP:   Recent Labs     05/04/21  0552 05/05/21  0200 05/06/21  0628    137 139   K 3.3* 3.9 3.6    101 103   CO2 22 18* 21   BUN 62* 63* 66*   CREATININE 2.5* 2.5* 2.6*   GLUCOSE 98 99 107*     CBC:   Recent Labs     05/04/21  0552   WBC 7.4   HGB 10.5*   HCT 31.9*          LFT's:   Recent Labs     05/04/21  0552   AST 28   ALT 33

## 2021-05-06 NOTE — PROGRESS NOTES
Physical Therapy  Discharge    Chart reviewed. Noted Palliative Care note from this AM.  Confirmed with CM that family wants hospice services at d/c.  Plans are for pt to d/c to long term care facility under hospice care. No further PT needs indicated. Will sign off.     Gene Bermeo

## 2021-05-06 NOTE — DISCHARGE INSTR - COC
Continuity of Care Form    Patient Name: Raul Angel   :  1921  MRN:  [id number]    Admit date:  2021  Discharge date:  ***    Code Status Order: DNR-CCA   Advance Directives:   [facility] Documentation       Date/Time Healthcare Directive Type of Healthcare Directive Copy in [facility] [address] Agent's Name Healthcare Agent's Phone Number    21 [phone]  No, patient does not have an advance directive for healthcare treatment -- -- -- -- --            Admitting Physician:  Gerardo Kasper DO  PCP: Arminda Martinez MD    Discharging Nurse: [facility] Unit/Room#: 8751/8383-28  Discharging Unit Phone Number: ***    Emergency Contact:   Extended Emergency Contact Information  Primary Emergency Contact: [address] Phone: [phone]  Mobile Phone: [phone]  Relation: Child    Past Surgical History:  Past Surgical History:   Procedure Laterality Date    CARPAL TUNNEL RELEASE      IR KYPHOPLASTY LUMBAR FIRST LEVEL  4/15/2021    IR KYPHOPLASTY LUMBAR FIRST LEVEL 4/15/2021 [facility]    PROSTATE SURGERY      SINUS SURGERY         Immunization History: There is no immunization history on file for this patient.     Active Problems:  Patient Active Problem List   Diagnosis Code    Acute non-ST-elevation MI following previous MI ([facility] [address] I22.2    Hypertension I10    A-fib ([facility] [facility] I48.91    Acute kidney injury superimposed on CKD ([facility] [address] N17.9, N18.9    CKD (chronic kidney disease) stage 3, GFR 30-59 ml/min N18.30    Cardiac pacemaker in situ Z95.0    Bradycardia R00.1    Chest pain R07.9    Exertional dyspnea R06.00    Localized edema R60.0    Chronic heart failure with preserved ejection fraction (HFpEF) ([facility]) I50.32    Closed compression fracture of body of L1 vertebra ([facility]) S32.010A    NSTEMI (non-ST elevated myocardial infarction) ([facility]) I21.4    NSTEMI (non-ST elevation myocardial infarction) ([facility]) I21.4 Isolation/Infection:   Isolation            No Isolation          Patient Infection Status       None to display            Nurse Assessment:  Last Vital Signs: /82   Pulse 78   Temp 97.8 °F (36.6 °C) (Oral)   Resp 18   Ht 5' 11.5\" (1.816 m)   Wt 151 lb 14.4 oz (68.9 kg)   SpO2 98%   BMI 20.89 kg/m²     Last documented pain score (0-10 scale): Pain Level: 0  Last Weight:   Wt Readings from Last 1 Encounters:   05/06/21 151 lb 14.4 oz (68.9 kg)     Mental Status:  {IP PT MENTAL STATUS:20030:::0}    IV Access:  { JESSI IV ACCESS:192266776:::0}    Nursing Mobility/ADLs:  Walking   {CHP DME ADLs:342306485:::0}  Transfer  {CHP DME ADLs:181139437:::0}  Bathing  {CHP DME ADLs:664615314:::0}  Dressing  {CHP DME ADLs:581837139:::0}  Toileting  {CHP DME ADLs:556077604:::0}  Feeding  {CHP DME ADLs:203247268:::0}  Med Admin  {P DME ADLs:316571889:::0}  Med Delivery   { JESSI MED Delivery:278928838:::0}    Wound Care Documentation and Therapy:        Elimination:  Continence:   · Bowel: {YES / AA:14465}  · Bladder: {YES / GW:51579}  Urinary Catheter: {Urinary Catheter:223051885:::0}   Colostomy/Ileostomy/Ileal Conduit: {YES / MU:74261}       Date of Last BM: ***    Intake/Output Summary (Last 24 hours) at 5/6/2021 1345  Last data filed at 5/6/2021 1200  Gross per 24 hour   Intake 634 ml   Output 450 ml   Net 184 ml     I/O last 3 completed shifts: In: 160 [P.O.:100;  I.V.:634]  Out: 850 [Urine:850]    Safety Concerns:     [address] [email or web address] Safety Concerns:166137593:::0}    Impairments/Disabilities:      [address] JESSI Impairments/Disabilities:989800115:::0}    Nutrition Therapy:  Current Nutrition Therapy:   [address]OWEN Diet List:958326894:::0}    Routes of Feeding: {CHP DME Other Feedings:708083817:::0}  Liquids: {Slp liquid thickness:06555}  Daily Fluid Restriction: {CHP DME Yes amt example:947943348:::0}  Last Modified Barium Swallow with Video (Video Swallowing Test): {Done Not Done JJLX:024303120:::5}    Treatments at the

## 2021-05-06 NOTE — PLAN OF CARE
Problem: Falls - Risk of:  Goal: Will remain free from falls  Description: Will remain free from falls  Outcome: Ongoing  Note: Patient at risk for falls. Patient resting quietly in bed. Side rails up x 2. Bed locked in lowest position. Bed alarm on. Bedside table and call light within reach. Patient instructed to call for assistance. Patient verbalized understanding. Will continue to monitor. Problem: Falls - Risk of:  Goal: Absence of physical injury  Description: Absence of physical injury  Outcome: Ongoing     Problem: Discharge Planning:  Goal: Discharged to appropriate level of care  Description: Discharged to appropriate level of care  Outcome: Ongoing     Problem: Cardiac Output - Decreased:  Goal: Hemodynamic stability will improve  Description: Hemodynamic stability will improve  Outcome: Ongoing     Problem: Pain:  Goal: Pain level will decrease  Description: Pain level will decrease  Outcome: Ongoing  Note: Pt not complaining of pain this PM. Pt instructed to call for any new/increasing pain levels. Pt verbalized understanding. Will continue to monitor.       Problem: Tissue Perfusion - Cardiopulmonary, Altered:  Goal: Absence of angina  Description: Absence of angina  Outcome: Ongoing     Problem: Skin Integrity:  Goal: Absence of new skin breakdown  Description: Absence of new skin breakdown  Outcome: Ongoing

## 2021-05-06 NOTE — PROGRESS NOTES
Patient Name: Raul Angel                                                    Primary Physician: Gerardo Kasper DO  Admitting Dx: NSTEMI (non-ST elevated myocardial infarction) [facility]) [I21.4]  Chest pain [R07.9]    Dr. Lois Burnham      Nephrology [facility] Progress Note  [facility]phrology  [email or web address]                                       Interval Plan:     · SCr  2.6 holding diuretics and Lisinopril. NO plans for dialysis. Ok with discharge to [facility]. Palliative care involved. Conservative management. DNR-CC. · Fair UOP of 850 cc. · K now 3.6.    · Hgb 10.5. No changes. · BP better 134/82. On Metoprolol 50 mg qd. No changes given elevated HR at this time. Assessment:     Stage IV CKD with ROSALINO  · SCr was 2.8 on admission from 2.4 last month. Holding diuretics and BP meds including Lisinopril. No plans for dialysis. · Hx of CRS with fluid compression on renal vessels leading to the eventual ROSALINO  · Increase in Cr levels from 1.8 on 2/18/19 to 2.3 on previous admission  · Previously on Lasix 40mg IV BID b/c of fluid overload which is not an issue currently. CHF (last echo 6/14/19 EF of 50-55%, no RWMA, RVH with mildly depressed systolic function)  · No current edema and holding on diuresis with Lasix 40mg IV BID  · Continue metoprolol 25mg   · NSTEMI with Cardiology following. Very high risk for LHC / COntrast.   HTN  · BP has been soft with 24X-409Q systolic and remains off Lisinopril with previous admission and ROSALINO  · Continue metoprolol 25mg  Anemia  · Drop in Hgb from 11.1 and no ONEAL. · Check Iron studies    Please call our office at [phone] or [employer] with any questions or contact me directly. Subjective:     CC / Reason for Consult:  CKD 4    HPI/PMH:    Raul Angel is a 80 y.o. male admitted for Chest pain (R07.9) and NSTEMI (I21.4) with PMHx of CKD 4, HTN, CHF, Edema, Anemia, SHPT, NSTEMI.   Patient baseline SCr fluctuation with volume status and CHF. Previously taken off ACEi for ROSALINO. Comes in now hypotensive. Admitted with chest pain and transferred from facility and was DNR CCA on admission.         ROS / Interval Hx: Patient seen in room. Resting in bed with no family or nursing. Coughing up phlegm but breathing is fine and states he is eating. Objective:        Gen: alert, well appearing, and in no distress and oriented to person, place, and time     Neck:  supple, no significant adenopathy     Cardio: normal rate, regular rhythm, normal S1, S2, no murmurs, rubs, clicks or gallops      Resp: clear to auscultation, no wheezes, rales or rhonchi, symmetric air entry. GI:  soft, nontender, nondistended, no masses or organomegaly. Ext:  peripheral pulses normal, no pedal edema, no clubbing or cyanosis      MS: no joint tenderness, deformity or swelling      DERM: normal coloration and turgor, no rashesor bruising    Vitals:     /82   Pulse 78   Temp 97.8 °F (36.6 °C) (Oral)   Resp 18   Ht 5' 11.5\" (1.816 m)   Wt 151 lb 14.4 oz (68.9 kg)   SpO2 98%   BMI 20.89 kg/m²      I/Os: Reviewed    Meds: Reviewed. Please see plan for changes made.     Labs:    Lab Results   Component Value Date    WBC 7.4 05/04/2021    HGB 10.5 05/04/2021    HCT 31.9 05/04/2021    MCV 91.4 05/04/2021     05/04/2021      Lab Results   Component Value Date    CREATININE 2.6 05/06/2021    BUN 66 05/06/2021     05/06/2021    K 3.6 05/06/2021    K 3.3 05/04/2021     05/06/2021    CO2 21 05/06/2021     No components found for: GLU   Lab Results   Component Value Date    PTH 47.7 07/02/2020    CALCIUM 9.3 05/06/2021    PHOS 3.3 05/05/2021      No results found for: [id number]   Lab Results   Component Value Date    IRON 75 07/01/2020    TIBC 236 07/01/2020    FERRITIN 133.0 07/01/2020      No results found for: Yinka Bailon

## 2021-05-06 NOTE — PROGRESS NOTES
palliative care consult and explained their role. Also instructed daughter to speak with MD and family regarding this decision. Cont goal  5/6: pt did not recall MBS or recommendations. Reviewed rationale for current NPO status and importance of oral care and ice chips. Pt does not indicate full comprehension. Cont goal    New goal:  3- Pt will participate in swallowing exercises with mod-max cues  5/6: oral mucosa appears healthy. Pt presented with 10 single ice chips and instructed to produce effortful swallow after each. Pt able to produce with mod cues. Mild cough on 2/10 trials, voice remained clear  Cont goal    Patient/Family/Caregiver Education:  As above    Safe Swallow Protocol: if choosing PO for QOL  Alternate solids and liquids  Eat/Feed slowly  Upright as possible for all oral intake  Effortful swallow  Remain upright for 30-45 minutes after meal  Small bites/sips       Plan:  Continued daily Dysphagia treatment with goals per  plan of care. Diet recommendations: pending family decision re PO for quality of life vs NPO with alternate form of nutrition  DC recommendation:  Treatment: 15  D/W nursing Olivia  Needs met prior to leaving room, call button in reach. Florentino Constantino M.S./[facility]-SLP #[id number]  Pg.  # [phone]  If patient is discharged prior to next treatment, this note will serve as the discharge summary

## 2021-05-06 NOTE — PROGRESS NOTES
Patient alert and oriented x4, VSS as charted, A-fib on telemetry. Pt ambulates with standby assist. Pt denied pain throughout shift. Pt currently in bed with bed alarm on, wheels locked and in lowest position. Awaiting for [facility] to arrive to transport pt to [facility].

## 2021-05-06 NOTE — PROGRESS NOTES
Pt discharged to [facility] via [address] transport. IVs and telemetry removed. Report given to EMTs upon arrival to unit. Discharge paperwork sent with pt. All of personal belongings sent with pt.

## 2021-05-06 NOTE — PROGRESS NOTES
[address]   Cardiology    Dr. Leola Heredia MD, Jarvis Pass FNP APRN CVNP  Date: 5/6/2021  Admit Date: 5/2/2021         Interval Hx: Today, he is resting quietly   No new complaints today. No major events overnight. Denies having chest pain, palpitations, shortness of breath, orthopnea/PND, cough, or dizziness at the time of this visit.  sCr 2.5  > 2.6 mild increase   / on BB statin eliquis   Vit D low start supplement  /  magnesium WNL   to  [facility]   Treating ASCVD medically / pt is [facility]    Patient seen and examined. Clinical notes reviewed. Telemetry reviewed / testing reviewed  >29 minutes   Pertinent labs, diagnostic, device, and imaging results reviewed as a part of this visit  EKG TTE stress test: any LHC/RHC reviewed     CC:cp    History obtained from patient and medical record.   Primary cardiologist:  Parker / Nena Plasencia a 80 y. o. male with a past medical history of persistent afib. CRDIII, SVT HTN admitted with c/o substernal cp after choking while eating, no c/o fever chills cough  n/v or SOB lasting 30 minutes. Pt is DNR-CC   VSS afebrile SV02 95% RA      PMH: HFpEF, CAF s/p unsuccessful AVJ ablation and Biotronik dual chamber PPM in 07/2011, follows with Dr. Ajith Espinosa, CKD (BL cr 1.8 - 2.0). The RA lead appears to be dislodged and undersensing afib. Device was changed to VVI to minimize RV pacing (was pacing the RV 90%) in 02/2019.   On eliquis and metoprolol     Echo 2017: LVEF 50%, moderate MR, nl RV, mild valve disease, RVSP 42  Nicki 2011: normal.    Echo 06/2019: mild LVH, LVEF 50-55%, indeterminate diastolic due to afib, mild RVD with mild dysfunction, moderate TR   Nicki 06/2019: normal   Patient was admitted 7/1 - 7/5/20 at [facility] for [facility]  ECG 7/1/20: A. fib with controlled ventricular response, right bundle branch block     5/3/2021 TTE  Left ventricular cavity size is normal. Overall left ventricular systolic function appears normal with an estimated ejection fraction of 55-60%. Grossly normal wall motion but technically difficult due to afib with RVR. Mitral valve is structurally normal. There is mild mitral regurgitation.      Tests:   sCr 2.8 (range 1.6-2.8) neph following   Trop  0.06>0.05>0.06 >0.06   ProBNP 7490        Past Medical History:   Diagnosis Date    Acute on chronic diastolic (congestive) heart failure ([facility] [address] 07/01/2020    please see H&P and discharge summary of 7/5/20  for cardiological history.  Hypertension     SVT (supraventricular tachycardia) (HCC)         Past Surgical History:    has a past surgical history that includes Prostate surgery; sinus surgery; Carpal tunnel release; and IR KYPHOPLASTY LUMBAR 1 VERTEBRAL BODY (4/15/2021). Social History:  Reviewed. reports that he quit smoking about 26 years ago. His smoking use included pipe and cigars. He has never used smokeless tobacco. He reports current alcohol use. He reports that he does not use drugs. Allergies:  No Known Allergies    Family History:  Reviewed. family history includes Heart Disease in his sister; Kidney Disease in his sister. Denies family history of sudden cardiac death, arrhythmia, premature CAD    Home Meds:  Prior to Visit Medications    Medication Sig Taking?  Authorizing Provider   amoxicillin-clavulanate (AUGMENTIN) 500-125 MG per tablet Take 1 tablet by mouth every 24 hours for 7 days Yes Ethan Clay MD   metoprolol succinate (TOPROL XL) 50 MG extended release tablet Take 1 tablet by mouth 2 times daily Yes Ethan Clay MD   ELIQUIS 2.5 MG TABS tablet TAKE 1 TABLET BY MOUTH TWICE A DAY Yes LASHELL Stanton CNP   timolol (TIMOPTIC-XE) 0.5 % ophthalmic gel-forming Place 1 drop into both eyes 2 times daily  Yes Historical Provider, MD   finasteride (PROSCAR) 5 MG tablet Take 5 mg by mouth daily  Yes Historical Provider, MD   simvastatin (ZOCOR) 40 MG tablet TAKE 1 TABLET IN THE EVENING Yes Historical Provider, MD Scheduled Meds:   ampicillin-sulbactam  1,500 mg Intravenous Q12H    Vitamin D  1,000 Units Oral Daily    metoprolol succinate  50 mg Oral BID    sodium chloride flush  10 mL Intravenous 2 times per day    apixaban  2.5 mg Oral BID    atorvastatin  40 mg Oral Nightly     Continuous Infusions:   dextrose 5 % and 0.45 % NaCl 50 mL/hr at 05/05/21 1702    sodium chloride         Vitals:    05/06/21 1159   BP: 134/82   Pulse: 78   Resp: 18   Temp: 97.8 °F (36.6 °C)   SpO2: 98%      In: 634 [I.V.:634]  Out: 450    Wt Readings from Last 3 Encounters:   05/06/21 151 lb 14.4 oz (68.9 kg)   04/14/21 158 lb (71.7 kg)   04/04/21 166 lb (75.3 kg)       Intake/Output Summary (Last 24 hours) at 5/6/2021 1450  Last data filed at 5/6/2021 1200  Gross per 24 hour   Intake 634 ml   Output 450 ml   Net 184 ml       Telemetry: Personally Reviewed    · Constitutional: Cooperative and in no apparent distress, and appears well nourished  · Skin: Warm and pink; no pallor, cyanosis, clubbing, or bruising   · HEENT: Symmetric and normocephalic. · Cardiovascular: Regular rate and  irhythm. · Respiratory: Respirations symmetric and unlabored. Lungs clear to auscultation bilaterally, no wheezing, crackles, or rhonchi  · Gastrointestinal: Abdomen soft and round. Bowel sounds normoactive without tenderness or masses. · Musculoskeletal: Bilateral upper and lower extremity strength 5/5 with full ROM  · Neurologic/Psych: Awake and orientated to person, place and time.  Calm affect, appropriate mood      BMP:   Recent Labs     05/04/21  0552 05/05/21  0200 05/06/21  0628    137 139   K 3.3* 3.9 3.6    101 103   CO2 22 18* 21   PHOS  --  3.3  --    BUN 62* 63* 66*   CREATININE 2.5* 2.5* 2.6*   MG 2.30  --   --      Estimated Creatinine Clearance: 15 mL/min (A) (based on SCr of 2.6 mg/dL (H)).   :   Recent Labs     05/04/21  0552   WBC 7.4   HGB 10.5*   HCT 31.9*   MCV 91.4        Thyroid:   Lab Results   Component

## 2021-05-10 NOTE — DISCHARGE SUMMARY
Discharge Summary    Date:5/10/2021        Patient Name:German Hall     YOB: 1921     Age:89 y.o. Admit Date:5/2/2021   Admission Condition:fair   Discharged Condition:fair  Discharge Date: 05/06/2021    Discharge Diagnoses   Principal Problem:    NSTEMI (non-ST elevated myocardial infarction) [facility])  Active Problems:    A-fib ([facility]    Acute kidney injury superimposed on CKD ([facility]    Chest pain    NSTEMI (non-ST elevation myocardial infarction) ([facility]  Resolved Problems:    * No resolved hospital problems. [facility] Stay   Narrative of Hospital Course:   51-year-old male admitted to the hospital after choking episode. Patient presented to the ER after choking episode. He complained of chest pain. He was admitted for ACS rule out. Troponin was mildly elevated on presentation. Cardiology was consulted, they opted for no intervention given advanced age. He had ROSALINO on presentation which is improved with IV fluids. Through the hospitalization he has showed the episodes of aspiration, he was complaining of cough with the phlegm production. Per SLP evaluation he was aspirating with everything. He was started on Unasyn for aspiration pneumonia. Per family wishes and family discussion with palliative care patient will be discharged with hospice care. I have discharged him on p.o. antibiotics. Hospice care should be continued upon discharge      Physical exam   Vitals:    05/06/21 0340 05/06/21 0737 05/06/21 1159 05/06/21 1535   BP: 124/67 119/69 134/82 127/79   Pulse: 92 91 78 124   Resp: 19 18 18 18   Temp: 99.4 °F (37.4 °C) 98 °F (36.7 °C) 97.8 °F (36.6 °C) 97.7 °F (36.5 °C)   TempSrc: Oral Oral Oral Oral   SpO2: 95% 99% 98% 98%   Weight: 151 lb 14.4 oz (68.9 kg)      Height:             Physical Exam     Physical Exam  Constitutional:       Appearance: Normal appearance. He is ill-appearing. HENT:      Head: Normocephalic and atraumatic.    Cardiovascular:      Rate and Rhythm: Tachycardia present. Rhythm irregular. Pulmonary:      Effort: Pulmonary effort is normal.      Breath sounds: Rhonchi present. Abdominal:      General: Abdomen is flat. Palpations: Abdomen is soft. Musculoskeletal: Normal range of motion. Skin:     General: Skin is warm and dry. Capillary Refill: Capillary refill takes less than 2 seconds. Neurological:      General: No focal deficit present. Mental Status: He is alert and oriented to person, place, and time. Psychiatric:         Mood and Affect: Mood normal.         Behavior: Behavior normal.     Consultants:   IP CONSULT TO CARDIOLOGY  IP CONSULT TO HOSPITALIST  IP CONSULT TO NEPHROLOGY  IP CONSULT TO PALLIATIVE CARE  IP CONSULT TO HOSPICE    Time Spent on Discharge:  45 minutes were spent in patient examination, evaluation, counseling as well as medication reconciliation, prescriptions for required medications, discharge plan and follow up. Surgeries/Procedures Performed:            Significant Diagnostic Studies:   Recent Labs:  CBC: No results for input(s): WBC, HGB, HCT, MCV, PLT in the last 72 hours. BMP: No results for input(s): NA, K, CL, CO2, PHOS, BUN, CREATININE in the last 72 hours. Invalid input(s): CA  Mag:   Lab Results   Component Value Date    MG 2.30 05/04/2021     LIVER PROFILE: No results for input(s): AST, ALT, LIPASE, BILIDIR, BILITOT, ALKPHOS in the last 72 hours. Invalid input(s): AMYLASE,  ALB  PT/INR: No results for input(s): PROTIME, INR in the last 72 hours. APTT: No results for input(s): APTT in the last 72 hours. BNP:  No results for input(s): BNP in the last 72 hours. CARDIAC ENZYMES: No results for input(s): CKTOTAL, CKMB, TROPONINI in the last 72 hours. Radiology Last 7 Days:  FL MODIFIED BARIUM SWALLOW W VIDEO   Final Result   FINDINGS/IMPRESSION:   1. Deep penetration/aspiration was seen with large amount of residue.    2. Please refer to the electronic report of the speech pathologist for further discussion. XR CHEST PORTABLE   Final Result      Clear lungs. Stable cardiac mediastinal silhouette with left subclavian dual-lead pacemaker. L1 vertebral augmentation cement noted. XR CHEST PORTABLE   Final Result      Cardiomegaly with mild pulmonary vascular congestion. Discharge Plan   Disposition: home with hospice     Provider Follow-Up:   No follow-up provider specified. Hospital/Incidental Findings Requiring Follow-Up:  FL MODIFIED BARIUM SWALLOW W VIDEO   Final Result   FINDINGS/IMPRESSION:   1. Deep penetration/aspiration was seen with large amount of residue. 2. Please refer to the electronic report of the speech pathologist for further discussion. XR CHEST PORTABLE   Final Result      Clear lungs. Stable cardiac mediastinal silhouette with left subclavian dual-lead pacemaker. L1 vertebral augmentation cement noted. XR CHEST PORTABLE   Final Result      Cardiomegaly with mild pulmonary vascular congestion. Discharge Medications         Medication List      START taking these medications    amoxicillin-clavulanate 500-125 MG per tablet  Commonly known as:  Augmentin  Take 1 tablet by mouth every 24 hours for 7 days        CHANGE how you take these medications    metoprolol succinate 50 MG extended release tablet  Commonly known as: TOPROL XL  Take 1 tablet by mouth 2 times daily  What changed:   · medication strength  · how much to take  · how to take this  · when to take this  · additional instructions        CONTINUE taking these medications    Eliquis 2.5 MG Tabs tablet  Generic drug: apixaban  TAKE 1 TABLET BY MOUTH TWICE A DAY     finasteride 5 MG tablet  Commonly known as: PROSCAR     simvastatin 40 MG tablet  Commonly known as: ZOCOR     timolol 0.5 % ophthalmic gel-forming  Commonly known as: TIMOPTIC-XE        STOP taking these medications    furosemide 40 MG tablet  Commonly known as: LASIX

## 2021-05-11 ENCOUNTER — NURSE ONLY (OUTPATIENT)
Dept: CARDIOLOGY CLINIC | Age: 86
End: 2021-05-11

## 2021-05-11 DIAGNOSIS — R00.1 BRADYCARDIA: ICD-10-CM

## 2021-05-11 DIAGNOSIS — Z95.0 CARDIAC PACEMAKER IN SITU: ICD-10-CM

## 2021-05-25 NOTE — PROGRESS NOTES
Remote transmission received for patient's dual chamber PACEMAKER. Transmission shows normal sensing and pacing function. EP physician will review. See interrogation under the cardiology tab in the [address] [place] [address] [address] field for more details. Will continue to monitor remotely. Battery remaining 20%. No  arrhythmias recorded. Last message: Apr 23, 2021. Pt went into hospice care 5/6/2021.

## 2021-06-15 ENCOUNTER — NURSE ONLY (OUTPATIENT)
Dept: CARDIOLOGY CLINIC | Age: 86
End: 2021-06-15

## 2021-06-15 DIAGNOSIS — R00.1 BRADYCARDIA: ICD-10-CM

## 2021-06-15 DIAGNOSIS — Z95.0 CARDIAC PACEMAKER IN SITU: ICD-10-CM

## 2021-07-02 LAB
EKG ATRIAL RATE: 163 BPM
EKG DIAGNOSIS: NORMAL
EKG Q-T INTERVAL: 314 MS
EKG QRS DURATION: 160 MS
EKG QTC CALCULATION (BAZETT): 502 MS
EKG R AXIS: 249 DEGREES
EKG T AXIS: -13 DEGREES
EKG VENTRICULAR RATE: 154 BPM

## 2021-08-04 ENCOUNTER — TELEPHONE (OUTPATIENT)
Dept: CARDIOLOGY CLINIC | Age: 86
End: 2021-08-04

## 2021-08-04 NOTE — TELEPHONE ENCOUNTER
citlali with [facility] said to cancel appt. They are not sure why appointment was made. [facility] turned off pace maker when he came into hospice in april, [facility] wants  to cx appointment, they don't feel that he needs to come in.

## 2021-08-04 NOTE — TELEPHONE ENCOUNTER
Dex Render with [address] will be calling back to inform us what is going on with patient device. She will let us know if able to transmit from his bed. Patient is in hospice and bed bound.    Dex Render his nurse # at [address] [phone]

## 2022-06-01 NOTE — PROGRESS NOTES
Progress Note    Date:5/6/2021       Room:6304/6304-01  Patient Name:German Hall     YOB: 1921     Age:89 y.o. Assessment        Hospital Problems           Last Modified POA    * (Principal) NSTEMI (non-ST elevated myocardial infarction) ([facility] 5/3/2021 Yes    A-fib ([facility] 5/3/2021 Yes    Acute kidney injury superimposed on CKD ([facility] 5/3/2021 Yes    Chest pain 5/2/2021 Yes    NSTEMI (non-ST elevation myocardial infarction) [facility] 5/3/2021 Yes          Plan:        Aspiration PNA  SLP eval showed severe oropharyngeal dysphagia  Currently n.p.o. On IV fluids  Continue Unasyn  Discussed with the palliative care, patient would be hospice appropriate given advanced age and severe dysphagia  Palliative care will get in touch with the family      Afib with RVR  Continue Metoprolol  Anticoagulated with apixaban  Discussed with Dr Alissa Hoskins    ROSALINO on CKD  Improved. Holding ACEi and lasix. Cr close to baseline  Discussed with Nephrology     Chest pain  Improved  Per cardiology conservative management. Disposition: Awaiting on placement with hospice services  Subjective   Interval History Status: not changed. Cough +  Low grade fever   Concern for aspiration.       Review of Systems   Review of Systems    Medications   Scheduled Meds:    ampicillin-sulbactam  1,500 mg Intravenous Q12H    Vitamin D  1,000 Units Oral Daily    metoprolol succinate  50 mg Oral BID    sodium chloride flush  10 mL Intravenous 2 times per day    apixaban  2.5 mg Oral BID    atorvastatin  40 mg Oral Nightly     Continuous Infusions:    dextrose 5 % and 0.45 % NaCl 50 mL/hr at 05/05/21 1702    sodium chloride       PRN Meds: guaiFENesin-dextromethorphan, sodium chloride flush, sodium chloride, potassium chloride, magnesium sulfate, promethazine **OR** ondansetron, famotidine, acetaminophen **OR** acetaminophen    Past History    Past Medical History:   has a past medical history of Acute on chronic diastolic [FreeTextEntry1] : c/o 2-3 episodes of vaginal spotting yesterday. (congestive) heart failure ([facility] Hypertension, and SVT (supraventricular tachycardia) ([facility] Social History:   reports that he quit smoking about 26 years ago. His smoking use included pipe and cigars. He has never used smokeless tobacco. He reports current alcohol use. He reports that he does not use drugs. Family History:   Family History   Problem Relation Age of Onset    Kidney Disease Sister     Heart Disease Sister        Physical Examination      Vitals:  /82   Pulse 78   Temp 97.8 °F (36.6 °C) (Oral)   Resp 18   Ht 5' 11.5\" (1.816 m)   Wt 151 lb 14.4 oz (68.9 kg)   SpO2 98%   BMI 20.89 kg/m²   Temp (24hrs), Av.1 °F (36.7 °C), Min:97 °F (36.1 °C), Max:99.4 °F (37.4 °C)      I/O (24Hr): Intake/Output Summary (Last 24 hours) at 2021 1204  Last data filed at 2021 1200  Gross per 24 hour   Intake 634 ml   Output 650 ml   Net -16 ml       Physical Exam  Constitutional:       Appearance: Normal appearance. He is ill-appearing. HENT:      Head: Normocephalic and atraumatic. Cardiovascular:      Rate and Rhythm: Tachycardia present. Rhythm irregular. Pulmonary:      Effort: Pulmonary effort is normal.      Breath sounds: Rhonchi present. Abdominal:      General: Abdomen is flat. Palpations: Abdomen is soft. Musculoskeletal: Normal range of motion. Skin:     General: Skin is warm and dry. Capillary Refill: Capillary refill takes less than 2 seconds. Neurological:      General: No focal deficit present. Mental Status: He is alert and oriented to person, place, and time.    Psychiatric:         Mood and Affect: Mood normal.         Behavior: Behavior normal.         Labs/Imaging/Diagnostics   Labs:  CBC:  Recent Labs     21  0552   WBC 7.4   RBC 3.49*   HGB 10.5*   HCT 31.9*   MCV 91.4   RDW 15.7*        CHEMISTRIES:  Recent Labs     21  0552 21  0200 21  0628    137 139   K 3.3* 3.9 3.6    101 103   CO2 22 18* 21   BUN 62* 63* 66*   CREATININE 2.5* 2.5* 2.6*   GLUCOSE 98 99 107*   PHOS  --  3.3  --    MG 2.30  --   --      PT/INR:  No results for input(s): PROTIME, INR in the last 72 hours. APTT:No results for input(s): APTT in the last 72 hours. LIVER PROFILE:  Recent Labs     05/04/21  0552   AST 28   ALT 33   BILITOT 1.0   ALKPHOS 154*       Imaging Last 24 Hours:  Echo Limited    Result Date: 5/3/2021  Transthoracic Echocardiography Report (TTE)  Demographics   Patient Name       Abi De León   Date of Study      05/03/2021         Gender              Male   Patient Number     [id number]         Date of Birth       11/20/1921   Visit Number       [id number]          Age                 80 year(s)   Accession Number   [id number]         Room Number         2766   Corporate ID       [id number]           [address] [place] [address] Drive, [address] [place] [address] [address], [address] [facility]   Ordering Physician Yvan Lepe.,  Interpreting        Jaya Stone MD                     DO                 Physician  Procedure Type of Study   TTE procedure:ECHOCARDIOGRAM LIMITED. Procedure Date Date: 05/03/2021 Start: 11:45 AM Study Location: [address] [place] [address] - Echo Lab Technical Quality: Adequate visualization Indications:Chest pain. Additional Indications:NSTEMI. Patient Status: Routine Height: 71 inches Weight: 158 pounds BSA: 1.91 m2 BMI: 22.04 kg/m2 BP: 122/63 mmHg  Conclusions   Summary  Limited echo. Left ventricular cavity size is normal. Overall left ventricular systolic  function appears normal with an estimated ejection fraction of 55-60%. Grossly normal wall motion but technically difficult due to afib with RVR. Mitral valve is structurally normal. There is mild mitral regurgitation.    Signature   ------------------------------------------------------------------  Electronically signed by Jaya Stone MD (Interpreting  physician) on 05/03/2021 at 01:29 PM ------------------------------------------------------------------   Findings   Left Ventricle  Left ventricular cavity size is normal. Overall left ventricular systolic  function appears normal with an estimated ejection fraction of 55-60%. Grossly normal wall motion but technically difficult due to afib with RVR. Mitral Valve  Mitral valve is structurally normal. There is mild mitral regurgitation. Aortic Valve  Aortic valve appears sclerotic but opens adequately. The aortic valve  appears tricuspid. Trivial aortic regurgitation is present. No evidence of  aortic valve stenosis. Aorta  The aortic root is normal in size. Pericardial Effusion  No pericardial effusion noted. Pleural Effusion  No pleural effusion. Miscellaneous  IVC size is normal (<2.1cm) and collapses > 50% with respiration consistent  with normal RA pressure (3mmHg).   M-Mode/2D Measurements (cm)   LV Diastolic Dimension: 5.97 cm LV Systolic Dimension: 8.39 cm  LV Septum Diastolic: 1.21 cm  LV PW Diastolic: 0.9 cm         AO Root Dimension: 3.6 cm  RV Diastolic Dimension: 5.61 cm LA Dimension: 5.2 cm  Aorta   Aortic Root: 3.6 cm        Electronically signed by Lisa Huynh MD on 5/5/21 at 8:33 AM EDT